# Patient Record
Sex: MALE | Race: WHITE | NOT HISPANIC OR LATINO | Employment: OTHER | URBAN - METROPOLITAN AREA
[De-identification: names, ages, dates, MRNs, and addresses within clinical notes are randomized per-mention and may not be internally consistent; named-entity substitution may affect disease eponyms.]

---

## 2017-12-07 ENCOUNTER — HOSPITAL ENCOUNTER (EMERGENCY)
Facility: HOSPITAL | Age: 67
Discharge: HOME | End: 2017-12-07
Attending: EMERGENCY MEDICINE | Admitting: EMERGENCY MEDICINE

## 2017-12-07 ENCOUNTER — APPOINTMENT (OUTPATIENT)
Dept: CT IMAGING | Facility: HOSPITAL | Age: 67
End: 2017-12-07

## 2017-12-07 VITALS
HEART RATE: 83 BPM | DIASTOLIC BLOOD PRESSURE: 91 MMHG | WEIGHT: 170 LBS | TEMPERATURE: 97.8 F | SYSTOLIC BLOOD PRESSURE: 155 MMHG | BODY MASS INDEX: 24.34 KG/M2 | RESPIRATION RATE: 18 BRPM | OXYGEN SATURATION: 97 % | HEIGHT: 70 IN

## 2017-12-07 DIAGNOSIS — S01.01XA LACERATION OF SCALP, INITIAL ENCOUNTER: ICD-10-CM

## 2017-12-07 DIAGNOSIS — S09.90XA CLOSED HEAD INJURY, INITIAL ENCOUNTER: Primary | ICD-10-CM

## 2017-12-07 DIAGNOSIS — S00.03XA HEMATOMA OF SCALP, INITIAL ENCOUNTER: ICD-10-CM

## 2017-12-07 DIAGNOSIS — S00.03XA CONTUSION OF SCALP, INITIAL ENCOUNTER: ICD-10-CM

## 2017-12-07 PROCEDURE — 70450 CT HEAD/BRAIN W/O DYE: CPT

## 2017-12-07 PROCEDURE — 99283 EMERGENCY DEPT VISIT LOW MDM: CPT

## 2017-12-08 NOTE — DISCHARGE INSTRUCTIONS
Follow up with Dr. Mckoy in 1 week for staple removal.     Take your medications as prescribed.  His continue alcohol    Follow up with your provider as instructed.    Return here immediately for nausea, vomiting, increasing headache, change in mental status, or new or emergent concerns.

## 2017-12-08 NOTE — ED PROVIDER NOTES
Subjective   HPI Comments: This patient is a 67-year-old gentleman who is at the bar at a local restaurant when he slipped off a stool, striking the back of his head against the bar.  Episode was witnessed.  Small laceration was noted.  Positive loss of consciousness.  Patient has a history of heart disease and sees Dr. Chuy Osorio.  Patient does take anticoagulation.  Patient is here with a friend and is acting appropriately according to this friend.  Patient states that he has no headache, no neck pain, no back pain, no weakness in the upper or lower extremities.  No paresthesia, no headache no vision changes no nausea vomiting or diarrhea.  Patient feels well.  He tells me he had 2 drinks today, and according to the note, this was confirmed by the .  In summary, we have a 67-year-old gentleman who was drinking at a local bar when he slipped off the barstool, striking the back of his head against the bar and having witnessed loss of consciousness times approximately 30 seconds.  He is now awake, oriented ×3, and acting normally according to her friend who is here with him.  He comes in today for evaluation status post closed head injury in for evaluation and treatment of the aforementioned laceration.        Past Medical History:  CAD  HTN  MI  DM  HLD  Tetanus shot is up-to-date    Past Family History:  No cardiac disease    Patient is a 67 y.o. male presenting with fall.   History provided by:  Patient  Fall   Mechanism of injury: fall    Injury location:  Head/neck  Head/neck injury location:  Scalp  Incident location: Bar.  Arrived directly from scene: yes    Fall:     Fall occurred:  From a stool    Impact surface:  Unable to specify    Point of impact:  Head  Protective equipment: none    Suspicion of alcohol use: yes    Suspicion of drug use: no    Tetanus status:  Unknown  Associated symptoms: headaches and loss of consciousness    Associated symptoms: no chest pain, no hearing loss, no  nausea, no neck pain, no seizures and no vomiting        Review of Systems   Constitutional: Negative.  Negative for chills, fatigue, fever and unexpected weight change.   HENT: Negative for dental problem, ear pain, hearing loss and sinus pressure.    Eyes: Negative for pain and visual disturbance.   Respiratory: Negative for chest tightness and shortness of breath.    Cardiovascular: Negative for chest pain, palpitations and leg swelling.   Gastrointestinal: Negative for diarrhea, nausea, rectal pain and vomiting.   Genitourinary: Negative for difficulty urinating, dysuria, frequency, hematuria and urgency.   Musculoskeletal: Negative for myalgias, neck pain and neck stiffness.   Neurological: Positive for loss of consciousness, syncope and headaches. Negative for seizures, speech difficulty and light-headedness.   Psychiatric/Behavioral: Negative for confusion.   All other systems reviewed and are negative.      Past Medical History:   Diagnosis Date   • Coronary artery disease    • Diabetes mellitus    • Hyperlipidemia    • Hypertension    • Myocardial infarction        Allergies   Allergen Reactions   • Penicillins        Past Surgical History:   Procedure Laterality Date   • KNEE SURGERY Right        History reviewed. No pertinent family history.    Social History     Social History   • Marital status:      Spouse name: N/A   • Number of children: N/A   • Years of education: N/A     Social History Main Topics   • Smoking status: Never Smoker   • Smokeless tobacco: None   • Alcohol use Yes      Comment: 2-3 drinks of vodka weekly   • Drug use: No   • Sexual activity: Not Asked     Other Topics Concern   • None     Social History Narrative   • None         Objective   Physical Exam   Constitutional: He is oriented to person, place, and time. He appears well-developed.  Non-toxic appearance. No distress.   HENT:   Head: Normocephalic.   Right Ear: Tympanic membrane, external ear and ear canal normal.    Left Ear: Tympanic membrane, external ear and ear canal normal.   Nose: Nose normal. No nasal septal hematoma.   Mouth/Throat: Oropharynx is clear and moist. Mucous membranes are not dry. No oral lesions. No trismus in the jaw. No dental abscesses or uvula swelling. No posterior oropharyngeal erythema or tonsillar abscesses. No tonsillar exudate.   4 cm laceration to the right posterior occipital region   Eyes: EOM are normal. Pupils are equal, round, and reactive to light. Right conjunctiva is not injected. Left conjunctiva is not injected.   Neck: Normal range of motion. Neck supple. No JVD present. No tracheal tenderness present. No rigidity. Normal range of motion present.   Cardiovascular: Normal rate, regular rhythm and normal heart sounds.  Exam reveals no gallop and no friction rub.    Pulmonary/Chest: Effort normal and breath sounds normal. He has no wheezes. He has no rales. He exhibits no tenderness.   Abdominal: Soft. Bowel sounds are normal. He exhibits no distension, no pulsatile midline mass and no mass. There is no tenderness. There is no rigidity, no rebound, no guarding and no tenderness at McBurney's point.   No signs of acute abdomen.  No pain at McBurney's point.  No pulsatile abdominal mass     Musculoskeletal: Normal range of motion. He exhibits no edema, tenderness or deformity.   Lymphadenopathy:     He has no cervical adenopathy.   Neurological: He is alert and oriented to person, place, and time. He has normal strength. He displays no tremor. No cranial nerve deficit. He exhibits normal muscle tone.   5/5 strength bilaterally with flexion and extension of fingers, wrist, elbows, knees and hips as well as plantar and dorsiflexion of the foot.     Skin: Skin is warm and dry. No rash noted. No erythema.   Psychiatric: He has a normal mood and affect. His speech is normal and behavior is normal. Judgment and thought content normal. He is attentive.   Nursing note and vitals  reviewed.      Laceration Repair  Date/Time: 12/7/2017 10:38 PM  Performed by: KEZIA ABERNATHY  Authorized by: KEZIA ABERNATHY     Consent:     Consent obtained:  Verbal    Consent given by:  Patient    Risks discussed:  Pain, infection, need for additional repair, nerve damage, poor wound healing, vascular damage, poor cosmetic result and tendon damage    Alternatives discussed:  No treatment and delayed treatment  Anesthesia (see MAR for exact dosages):     Anesthesia method:  None  Laceration details:     Location:  Scalp    Scalp location:  Occipital    Length (cm):  4  Repair type:     Repair type:  Simple  Pre-procedure details:     Preparation:  Patient was prepped and draped in usual sterile fashion  Treatment:     Area cleansed with:  Betadine    Amount of cleaning:  Standard    Irrigation method:  Pressure wash    Visualized foreign bodies/material removed: no    Skin repair:     Repair method:  Staples    Number of staples:  2  Approximation:     Approximation:  Close    Vermilion border: well-aligned    Post-procedure details:     Dressing:  Open (no dressing)    Patient tolerance of procedure:  Tolerated well, no immediate complications                 ED Course  ED Course   Comment By Time   I had a long conversation with the patient and his friend.  His friend is not intoxicated and will take the patient home.  Friend reports that the patient is acting appropriately from a mental status standpoint.  The patient has a 4 cm laceration in the right posterior occipital scalp region.  CT scan of the head is negative for acute intracranial abnormality.  The patient's tetanus shot is up-to-date.  Please see procedure note which will be placed in the chart for staple placement.  I've Doug told the patient and friend the staples will need to be removed in 7 days with primary care physician, Dr. Riley Mckoy D.O.  Impression will include scalp laceration, scalp hematoma, scalp contusion, and closed head  "injury status post fall.  Plan will include keeping wound clean and dry, follow up for staple removal in 7 days, return immediately for nausea vomiting or change in mental status or increasing headache.  Patient appreciative for care without question or complaint.'s patient is speaking clearly, he is oriented ×3, and has someone willing to take him home.  All questions answered and patient will be discharged home accordingly Compa Hair MD 12/07 5200   Please see procedure note for details.  Patient tolerated procedure well.  No, locations.  A total of 2 staples were placed with good approximation of the wound margin and good hemostasis.  The patient will be discharged home under the care of his friend.  Impression and plan as previously noted.  Patient is had all questions answered and will be discharged home accordingly.  CT scan of the head negative for acute abnormalities. Compa Hair MD 12/07 4234     No results found for this or any previous visit (from the past 24 hour(s)).  Note: In addition to lab results from this visit, the labs listed above may include labs taken at another facility or during a different encounter within the last 24 hours. Please correlate lab times with ED admission and discharge times for further clarification of the services performed during this visit.    CT Head Without Contrast   Final Result      1.  No acute intracranial abnormality.      2.  Minimal right posterior soft tissue swelling.      3.  Incidental/non-acute findings are described above.      THIS DOCUMENT HAS BEEN ELECTRONICALLY SIGNED BY CATHY PALMER MD        Vitals:    12/07/17 2142   BP: 155/91   BP Location: Left arm   Patient Position: Lying   Pulse: 83   Resp: 18   Temp: 97.8 °F (36.6 °C)   TempSrc: Oral   SpO2: 97%   Weight: 77.1 kg (170 lb)   Height: 177.8 cm (70\")     Medications - No data to display  ECG/EMG Results (last 24 hours)     ** No results found for the last 24 hours. **                "         MDM    Final diagnoses:   Closed head injury, initial encounter   Hematoma of scalp, initial encounter   Laceration of scalp, initial encounter   Contusion of scalp, initial encounter     EMR Dragon/Transcription disclaimer:  Much of this encounter note is an electronic transcription/translation of spoken language to printed text. The electronic translation of spoken language may permit erroneous, or at times, nonsensical words or phrases to be inadvertently transcribed; Although I have reviewed the note for such errors, some may still exist.    Documentation assistance provided by hank Thornton.  Information recorded by the hank was done at my direction and has been verified and validated by me.     Ehsan JONES Thornton  12/07/17 2226       Ehsan JONES Thornton  12/07/17 2227       Ehsan JONES Thornton  12/07/17 2234       Ehsan JONES Thornton  12/07/17 2254       Compa Hair MD  12/08/17 0129

## 2022-09-05 ENCOUNTER — APPOINTMENT (OUTPATIENT)
Dept: GENERAL RADIOLOGY | Facility: HOSPITAL | Age: 72
End: 2022-09-05

## 2022-09-05 ENCOUNTER — HOSPITAL ENCOUNTER (INPATIENT)
Facility: HOSPITAL | Age: 72
LOS: 9 days | Discharge: HOME HEALTH | End: 2022-09-15
Attending: EMERGENCY MEDICINE | Admitting: INTERNAL MEDICINE

## 2022-09-05 DIAGNOSIS — Z79.4 TYPE 2 DIABETES MELLITUS WITHOUT COMPLICATION, WITH LONG-TERM CURRENT USE OF INSULIN: Chronic | ICD-10-CM

## 2022-09-05 DIAGNOSIS — E11.9 TYPE 2 DIABETES MELLITUS WITHOUT COMPLICATION, WITH LONG-TERM CURRENT USE OF INSULIN: Chronic | ICD-10-CM

## 2022-09-05 DIAGNOSIS — N28.9 IMPAIRED RENAL FUNCTION: ICD-10-CM

## 2022-09-05 DIAGNOSIS — J90 RECURRENT RIGHT PLEURAL EFFUSION: Primary | ICD-10-CM

## 2022-09-05 DIAGNOSIS — R18.8 CIRRHOSIS OF LIVER WITH ASCITES, UNSPECIFIED HEPATIC CIRRHOSIS TYPE: ICD-10-CM

## 2022-09-05 DIAGNOSIS — I25.10 CORONARY ARTERY DISEASE INVOLVING NATIVE CORONARY ARTERY OF NATIVE HEART WITHOUT ANGINA PECTORIS: Chronic | ICD-10-CM

## 2022-09-05 DIAGNOSIS — I10 HYPERTENSION, UNSPECIFIED TYPE: Chronic | ICD-10-CM

## 2022-09-05 DIAGNOSIS — R06.02 SHORTNESS OF BREATH: ICD-10-CM

## 2022-09-05 DIAGNOSIS — K74.60 CIRRHOSIS OF LIVER WITH ASCITES, UNSPECIFIED HEPATIC CIRRHOSIS TYPE: ICD-10-CM

## 2022-09-05 PROBLEM — K74.69 OTHER CIRRHOSIS OF LIVER (HCC): Chronic | Status: ACTIVE | Noted: 2022-09-05

## 2022-09-05 LAB
ALBUMIN SERPL-MCNC: 3.2 G/DL (ref 3.5–5.2)
ALBUMIN/GLOB SERPL: 0.7 G/DL
ALP SERPL-CCNC: 146 U/L (ref 39–117)
ALT SERPL W P-5'-P-CCNC: 58 U/L (ref 1–41)
ANION GAP SERPL CALCULATED.3IONS-SCNC: 13 MMOL/L (ref 5–15)
AST SERPL-CCNC: 109 U/L (ref 1–40)
BASOPHILS # BLD AUTO: 0.02 10*3/MM3 (ref 0–0.2)
BASOPHILS NFR BLD AUTO: 0.4 % (ref 0–1.5)
BILIRUB SERPL-MCNC: 0.2 MG/DL (ref 0–1.2)
BUN SERPL-MCNC: 14 MG/DL (ref 8–23)
BUN/CREAT SERPL: 9.6 (ref 7–25)
CALCIUM SPEC-SCNC: 9 MG/DL (ref 8.6–10.5)
CHLORIDE SERPL-SCNC: 101 MMOL/L (ref 98–107)
CO2 SERPL-SCNC: 22 MMOL/L (ref 22–29)
CREAT SERPL-MCNC: 1.46 MG/DL (ref 0.76–1.27)
DEPRECATED RDW RBC AUTO: 51 FL (ref 37–54)
EGFRCR SERPLBLD CKD-EPI 2021: 50.8 ML/MIN/1.73
EOSINOPHIL # BLD AUTO: 0.29 10*3/MM3 (ref 0–0.4)
EOSINOPHIL NFR BLD AUTO: 5.8 % (ref 0.3–6.2)
ERYTHROCYTE [DISTWIDTH] IN BLOOD BY AUTOMATED COUNT: 16.2 % (ref 12.3–15.4)
GLOBULIN UR ELPH-MCNC: 4.5 GM/DL
GLUCOSE BLDC GLUCOMTR-MCNC: 89 MG/DL (ref 70–130)
GLUCOSE SERPL-MCNC: 227 MG/DL (ref 65–99)
HBA1C MFR BLD: 9.3 % (ref 4.8–5.6)
HCT VFR BLD AUTO: 30.3 % (ref 37.5–51)
HGB BLD-MCNC: 9.8 G/DL (ref 13–17.7)
HOLD SPECIMEN: NORMAL
IMM GRANULOCYTES # BLD AUTO: 0.03 10*3/MM3 (ref 0–0.05)
IMM GRANULOCYTES NFR BLD AUTO: 0.6 % (ref 0–0.5)
INR PPP: 1.05 (ref 0.84–1.13)
LYMPHOCYTES # BLD AUTO: 0.77 10*3/MM3 (ref 0.7–3.1)
LYMPHOCYTES NFR BLD AUTO: 15.3 % (ref 19.6–45.3)
MCH RBC QN AUTO: 28.1 PG (ref 26.6–33)
MCHC RBC AUTO-ENTMCNC: 32.3 G/DL (ref 31.5–35.7)
MCV RBC AUTO: 86.8 FL (ref 79–97)
MONOCYTES # BLD AUTO: 0.76 10*3/MM3 (ref 0.1–0.9)
MONOCYTES NFR BLD AUTO: 15.1 % (ref 5–12)
NEUTROPHILS NFR BLD AUTO: 3.15 10*3/MM3 (ref 1.7–7)
NEUTROPHILS NFR BLD AUTO: 62.8 % (ref 42.7–76)
NRBC BLD AUTO-RTO: 0 /100 WBC (ref 0–0.2)
NT-PROBNP SERPL-MCNC: 332.2 PG/ML (ref 0–900)
PLATELET # BLD AUTO: 83 10*3/MM3 (ref 140–450)
PMV BLD AUTO: 12.4 FL (ref 6–12)
POTASSIUM SERPL-SCNC: 3.9 MMOL/L (ref 3.5–5.2)
PROT SERPL-MCNC: 7.7 G/DL (ref 6–8.5)
PROTHROMBIN TIME: 13.6 SECONDS (ref 11.4–14.4)
RBC # BLD AUTO: 3.49 10*6/MM3 (ref 4.14–5.8)
SODIUM SERPL-SCNC: 136 MMOL/L (ref 136–145)
TROPONIN T SERPL-MCNC: 0.01 NG/ML (ref 0–0.03)
WBC NRBC COR # BLD: 5.02 10*3/MM3 (ref 3.4–10.8)
WHOLE BLOOD HOLD COAG: NORMAL
WHOLE BLOOD HOLD SPECIMEN: NORMAL

## 2022-09-05 PROCEDURE — 84484 ASSAY OF TROPONIN QUANT: CPT | Performed by: EMERGENCY MEDICINE

## 2022-09-05 PROCEDURE — 83036 HEMOGLOBIN GLYCOSYLATED A1C: CPT | Performed by: INTERNAL MEDICINE

## 2022-09-05 PROCEDURE — 99284 EMERGENCY DEPT VISIT MOD MDM: CPT

## 2022-09-05 PROCEDURE — 83880 ASSAY OF NATRIURETIC PEPTIDE: CPT | Performed by: EMERGENCY MEDICINE

## 2022-09-05 PROCEDURE — 63710000001 INSULIN DETEMIR PER 5 UNITS: Performed by: INTERNAL MEDICINE

## 2022-09-05 PROCEDURE — 85610 PROTHROMBIN TIME: CPT | Performed by: INTERNAL MEDICINE

## 2022-09-05 PROCEDURE — G0378 HOSPITAL OBSERVATION PER HR: HCPCS

## 2022-09-05 PROCEDURE — 93005 ELECTROCARDIOGRAM TRACING: CPT

## 2022-09-05 PROCEDURE — 82962 GLUCOSE BLOOD TEST: CPT

## 2022-09-05 PROCEDURE — 85025 COMPLETE CBC W/AUTO DIFF WBC: CPT

## 2022-09-05 PROCEDURE — 71045 X-RAY EXAM CHEST 1 VIEW: CPT

## 2022-09-05 PROCEDURE — 80053 COMPREHEN METABOLIC PANEL: CPT | Performed by: EMERGENCY MEDICINE

## 2022-09-05 PROCEDURE — 36415 COLL VENOUS BLD VENIPUNCTURE: CPT

## 2022-09-05 PROCEDURE — 93005 ELECTROCARDIOGRAM TRACING: CPT | Performed by: EMERGENCY MEDICINE

## 2022-09-05 PROCEDURE — 99223 1ST HOSP IP/OBS HIGH 75: CPT | Performed by: INTERNAL MEDICINE

## 2022-09-05 RX ORDER — LACTULOSE 10 G/15ML
10 SOLUTION ORAL DAILY
Status: DISCONTINUED | OUTPATIENT
Start: 2022-09-05 | End: 2022-09-07

## 2022-09-05 RX ORDER — ATORVASTATIN CALCIUM 10 MG/1
10 TABLET, FILM COATED ORAL NIGHTLY
Status: DISCONTINUED | OUTPATIENT
Start: 2022-09-05 | End: 2022-09-15 | Stop reason: HOSPADM

## 2022-09-05 RX ORDER — AMOXICILLIN 250 MG
2 CAPSULE ORAL 2 TIMES DAILY
Status: DISCONTINUED | OUTPATIENT
Start: 2022-09-05 | End: 2022-09-15 | Stop reason: HOSPADM

## 2022-09-05 RX ORDER — POLYETHYLENE GLYCOL 3350 17 G/17G
17 POWDER, FOR SOLUTION ORAL DAILY PRN
Status: DISCONTINUED | OUTPATIENT
Start: 2022-09-05 | End: 2022-09-15 | Stop reason: HOSPADM

## 2022-09-05 RX ORDER — SODIUM CHLORIDE 0.9 % (FLUSH) 0.9 %
10 SYRINGE (ML) INJECTION EVERY 12 HOURS SCHEDULED
Status: DISCONTINUED | OUTPATIENT
Start: 2022-09-05 | End: 2022-09-15 | Stop reason: HOSPADM

## 2022-09-05 RX ORDER — DEXTROSE MONOHYDRATE 25 G/50ML
25 INJECTION, SOLUTION INTRAVENOUS
Status: DISCONTINUED | OUTPATIENT
Start: 2022-09-05 | End: 2022-09-15 | Stop reason: HOSPADM

## 2022-09-05 RX ORDER — METOPROLOL SUCCINATE 50 MG/1
50 TABLET, EXTENDED RELEASE ORAL
Status: DISCONTINUED | OUTPATIENT
Start: 2022-09-05 | End: 2022-09-08

## 2022-09-05 RX ORDER — SODIUM CHLORIDE 0.9 % (FLUSH) 0.9 %
10 SYRINGE (ML) INJECTION AS NEEDED
Status: DISCONTINUED | OUTPATIENT
Start: 2022-09-05 | End: 2022-09-15 | Stop reason: HOSPADM

## 2022-09-05 RX ORDER — INSULIN LISPRO 100 [IU]/ML
0-7 INJECTION, SOLUTION INTRAVENOUS; SUBCUTANEOUS
Status: DISCONTINUED | OUTPATIENT
Start: 2022-09-05 | End: 2022-09-12 | Stop reason: DRUGHIGH

## 2022-09-05 RX ORDER — ACETAMINOPHEN 650 MG/1
325 SUPPOSITORY RECTAL EVERY 6 HOURS PRN
Status: DISCONTINUED | OUTPATIENT
Start: 2022-09-05 | End: 2022-09-15 | Stop reason: HOSPADM

## 2022-09-05 RX ORDER — ONDANSETRON 2 MG/ML
4 INJECTION INTRAMUSCULAR; INTRAVENOUS EVERY 6 HOURS PRN
Status: DISCONTINUED | OUTPATIENT
Start: 2022-09-05 | End: 2022-09-15 | Stop reason: HOSPADM

## 2022-09-05 RX ORDER — BISACODYL 5 MG/1
5 TABLET, DELAYED RELEASE ORAL DAILY PRN
Status: DISCONTINUED | OUTPATIENT
Start: 2022-09-05 | End: 2022-09-15 | Stop reason: HOSPADM

## 2022-09-05 RX ORDER — LEVOTHYROXINE SODIUM 0.07 MG/1
75 TABLET ORAL
Status: DISCONTINUED | OUTPATIENT
Start: 2022-09-06 | End: 2022-09-15 | Stop reason: HOSPADM

## 2022-09-05 RX ORDER — ONDANSETRON 4 MG/1
4 TABLET, FILM COATED ORAL EVERY 6 HOURS PRN
Status: DISCONTINUED | OUTPATIENT
Start: 2022-09-05 | End: 2022-09-15 | Stop reason: HOSPADM

## 2022-09-05 RX ORDER — ACETAMINOPHEN 500 MG
500 TABLET ORAL EVERY 6 HOURS PRN
Status: DISCONTINUED | OUTPATIENT
Start: 2022-09-05 | End: 2022-09-15 | Stop reason: HOSPADM

## 2022-09-05 RX ORDER — PANTOPRAZOLE SODIUM 40 MG/1
40 TABLET, DELAYED RELEASE ORAL
Status: DISCONTINUED | OUTPATIENT
Start: 2022-09-06 | End: 2022-09-14

## 2022-09-05 RX ORDER — VENLAFAXINE HYDROCHLORIDE 75 MG/1
150 CAPSULE, EXTENDED RELEASE ORAL
Status: DISCONTINUED | OUTPATIENT
Start: 2022-09-06 | End: 2022-09-15 | Stop reason: HOSPADM

## 2022-09-05 RX ORDER — NICOTINE POLACRILEX 4 MG
15 LOZENGE BUCCAL
Status: DISCONTINUED | OUTPATIENT
Start: 2022-09-05 | End: 2022-09-15 | Stop reason: HOSPADM

## 2022-09-05 RX ORDER — ACETAMINOPHEN 160 MG/5ML
500 SOLUTION ORAL EVERY 6 HOURS PRN
Status: DISCONTINUED | OUTPATIENT
Start: 2022-09-05 | End: 2022-09-15 | Stop reason: HOSPADM

## 2022-09-05 RX ORDER — BISACODYL 10 MG
10 SUPPOSITORY, RECTAL RECTAL DAILY PRN
Status: DISCONTINUED | OUTPATIENT
Start: 2022-09-05 | End: 2022-09-15 | Stop reason: HOSPADM

## 2022-09-05 RX ADMIN — ATORVASTATIN CALCIUM 10 MG: 10 TABLET, FILM COATED ORAL at 21:04

## 2022-09-05 RX ADMIN — LACTULOSE 10 G: 20 SOLUTION ORAL at 17:35

## 2022-09-05 RX ADMIN — METOPROLOL SUCCINATE 50 MG: 50 TABLET, EXTENDED RELEASE ORAL at 17:35

## 2022-09-05 RX ADMIN — RIFAXIMIN 550 MG: 550 TABLET ORAL at 21:05

## 2022-09-05 RX ADMIN — INSULIN DETEMIR 15 UNITS: 100 INJECTION, SOLUTION SUBCUTANEOUS at 21:02

## 2022-09-05 RX ADMIN — Medication 10 ML: at 21:07

## 2022-09-05 NOTE — PLAN OF CARE
"  Problem: Adult Inpatient Plan of Care  Goal: Plan of Care Review  Outcome: Ongoing, Progressing  Flowsheets (Taken 9/5/2022 1825)  Plan of Care Reviewed With: patient  Outcome Evaluation: Patient alert, oriented x 4, respiration symmetrical, unlabored on room air, no shortness of breath noted. Patient made no complaints of pain or discomfort. Oriented to care setting, watched \" Call, Don't Fall video\", educated to use call light when needing assistance. Dinner tolerated, no eating or swallowing issues reported. Monitoring continues.  Goal: Patient-Specific Goal (Individualized)  Outcome: Ongoing, Progressing  Goal: Absence of Hospital-Acquired Illness or Injury  Outcome: Ongoing, Progressing  Intervention: Prevent and Manage VTE (Venous Thromboembolism) Risk  Recent Flowsheet Documentation  Taken 9/5/2022 1708 by Kristie Hayden RN  Range of Motion: active ROM (range of motion) encouraged  Goal: Optimal Comfort and Wellbeing  Outcome: Ongoing, Progressing  Intervention: Provide Person-Centered Care  Recent Flowsheet Documentation  Taken 9/5/2022 1708 by Kristie Hayden RN  Trust Relationship/Rapport:   care explained   questions answered   questions encouraged  Goal: Readiness for Transition of Care  Outcome: Ongoing, Progressing  Intervention: Mutually Develop Transition Plan  Recent Flowsheet Documentation  Taken 9/5/2022 1708 by Kristie Hayden RN  Transportation Anticipated: family or friend will provide  Patient/Family Anticipated Services at Transition: none  Patient/Family Anticipates Transition to: (Assisted Living Facility) other (see comments)  Taken 9/5/2022 1707 by Kristie Hayden RN  Equipment Currently Used at Home: none   Goal Outcome Evaluation:  Plan of Care Reviewed With: patient           Outcome Evaluation: Patient alert, oriented x 4, respiration symmetrical, unlabored on room air, no shortness of breath noted. Patient made no complaints of pain or discomfort. Oriented to care setting, " "watched \" Call, Don't Fall video\", educated to use call light when needing assistance. Dinner tolerated, no eating or swallowing issues reported. Monitoring continues.  "

## 2022-09-05 NOTE — H&P
[facility] Medicine Services  HISTORY AND PHYSICAL    Patient Name: Gwyn Fuchs  : 1950  MRN: [id number]  Primary Care Physician: Riley Mckoy DO  Date of admission: 2022      Subjective   Subjective     Chief Complaint:  Shortness of breath, cough    HPI:  Gwyn Fuchs is a 72 y.o. male w/ LEZAMA cirrhosis, CAD s/p remote stenting, prior cigar use who presents to the ED with his daughter at bedside for evaluation of recurrent pleural effusion.  He developed orthopnea, exertional dyspnea, intermittent cough (sometimes productive of scant clear sputum) 3+ months ago. His PCP identified a large RT pleural effusion and referred him to pulmonology at [place], on 22 and again 22 he underwent large-volume thoracentesis with ~2L fluid removal each time.  Per the patient/daughter they are unclear of any formal diagnosis or laboratory studies being sent.  His fluid has been quickly reaccumulating with recurrence of his symptoms, they felt frustrated they were not making answers and presented to our ED for further evaluation.  He denies fever, chill, weight loss, hemoptysis, wheezing, abdominal distention, peripheral edema.    He was previously followed with Dr. Zay Osorio, jas last appointment, potentially .  Family took away his car keys and moved him into assisted living earlier this year for gradual memory/cognitive issues.      Review of Systems   Gen- No fevers, chills  CV- No chest pain, palpitations  Resp-yes cough, dyspnea  GI- No N/V/D, abd pain  All other systems reviewed and are negative.     Personal History     Past Medical History:   Diagnosis Date   • Coronary artery disease    • Diabetes mellitus (HCC)    • Hyperlipidemia    • Hypertension    • Myocardial infarction (HCC)              Past Surgical History:   Procedure Laterality Date   • KNEE SURGERY Right        Family History:  family history includes Cancer in his brother; Heart  "failure in his father. Otherwise pertinent FHx was reviewed and unremarkable.     Social History:  reports that he has quit smoking. His smoking use included cigars. He has never used smokeless tobacco. He reports previous alcohol use. He reports that he does not use drugs.  Social History     Social History Narrative    Hx of \"social\" cigar use; prior EtOH use of 2-3 beers every other weekend; early 2022 moved into assisted living and family took away his car keys for early memory issues       Medications:  Available home medication information reviewed.  (Not in a hospital admission)      Allergies   Allergen Reactions   • Penicillins        Objective   Objective     Vital Signs:   Temp:  [98.1 °F (36.7 °C)] 98.1 °F (36.7 °C)  Heart Rate:  [] 92  Resp:  [20] 20  BP: (153-170)/(86-98) 165/96       Physical Exam   Constitutional: Awake, alert, no acute distress lying on ED stretcher  Eyes: PERRL, sclerae anicteric, no conjunctival injection  HENT: NCAT, mucous membranes moist  Neck: Supple, trachea midline  Respiratory: Diminished/absent breath sounds over RT middle and lower lung fields, otherwise CTAB, respiratory effort normal  Cardiovascular: RRR, no murmurs, rubs, or gallops, palpable pedal pulses bilaterally  Gastrointestinal: Positive bowel sounds, soft, nontender, nondistended  Musculoskeletal: No bilateral ankle edema, no clubbing or cyanosis to extremities  Psychiatric: Appropriate affect, cooperative  Neurologic: Speech clear and fluent, moving all extremities spontaneously    Result Review:  I have personally reviewed the results from the time of this admission to 9/5/2022 16:30 EDT and agree with these findings:  []  Laboratory list / accordion  []  Microbiology  [x]  Radiology  []  EKG/Telemetry   []  Cardiology/Vascular   []  Pathology  []  Old records  []  Other:  Most notable findings include: Large RT pleural effusion        LAB RESULTS:      Lab 09/05/22  1230   WBC 5.02   HEMOGLOBIN 9.8* "   HEMATOCRIT 30.3*   PLATELETS 83*   NEUTROS ABS 3.15   IMMATURE GRANS (ABS) 0.03   LYMPHS ABS 0.77   MONOS ABS 0.76   EOS ABS 0.29   MCV 86.8         Lab 09/05/22  1230   SODIUM 136   POTASSIUM 3.9   CHLORIDE 101   CO2 22.0   ANION GAP 13.0   BUN 14   CREATININE 1.46*   EGFR 50.8*   GLUCOSE 227*   CALCIUM 9.0         Lab 09/05/22  1230   TOTAL PROTEIN 7.7   ALBUMIN 3.20*   GLOBULIN 4.5   ALT (SGPT) 58*   AST (SGOT) 109*   BILIRUBIN 0.2   ALK PHOS 146*         Lab 09/05/22  1230   PROBNP 332.2   TROPONIN T 0.012                     Microbiology Results (last 10 days)     ** No results found for the last 240 hours. **          XR Chest 1 View    Result Date: 9/5/2022   DATE OF EXAM: 9/5/2022 11:45 AM  PROCEDURE: XR CHEST 1 VW-  INDICATIONS: SOA triage protocol  COMPARISON: No Comparisons Available  TECHNIQUE: Portable Chest  FINDINGS:  Study is limited by overlying support and monitoring apparatus  There is a large right-sided pleural effusion suspected. Vascular crowding and dependent atelectasis noted at the right base. Fluid is noted within the fissure. Left lung appears grossly clear. Attenuation heart size is limited by pleural effusion. Mediastinum appears grossly unremarkable on the left. Osseous structures demonstrate no acute abnormality      Impression: IMPRESSION : Large right-sided pleural effusion with associated vascular crowding and dependent atelectasis on the right.  Left lung appears grossly clear[  This report was finalized on 9/5/2022 12:14 PM by Godwin Huston.            Assessment & Plan   Assessment & Plan     Active Hospital Problems    Diagnosis  POA   • **Recurrent right pleural effusion [J90]  Unknown   • Other cirrhosis of liver (HCC) [K74.69]  Yes   • Coronary artery disease involving native coronary artery of native heart without angina pectoris [I25.10]  Yes   • HTN (hypertension) [I10]  Yes   • Type 2 diabetes mellitus without complication, with long-term current use of insulin  (HCC) [E11.9, Z79.4]  Not Applicable   • Impaired renal function [N28.9]  Yes     Summary: This is a 72-year-old male with Lezama cirrhosis, CAD s/p prior MI and stenting, DM2 who was initially evaluated outpatient for progressive orthopnea/DHILLON and identified to have large pleural effusion, s/p x2 thoracentesis at OSH with unclear laboratory testing/results, presenting to our facility with recurrent symptoms and large pleural effusion    (Per medication list at the bedside patient's home medicines are as follows: Rifaximin 550 mg BID, lactulose (titrate for 2-3 small bowel movements), levothyroxine 75 mcg qam, Toprol-XL 50 mg da, omeprazole 20 mg da, venlafaxine 150 mg da, metformin (unsure dose), simvastatin 40 mg qhs, basal insulin 30U qam / 25U qhs)    Assessment/plan    Recurrent large RT pleural effusion, unclear etiology  -S/p large-volume thoracentesis at OSH 8/18, 8/24; via [facility] [place] basis; unclear labs/Dx  -Check echo  -Sputum culture if able to provide  - Orders placed for IR guided RT thoracentesis with associated labs, cytology, etc.  - Recommend postthoracentesis CXR PA-LAT    LEZAMA cirrhosis w/ anemia/thrombocytopenia  -Home Xifaxan, lactulose, PPI  - Not currently on any diuretics  - Pending thoracentesis labs, consider abd US for evaluation of ascites (clinically not distended)  -Add on INR    CAD s/p remote stenting  Hx MI  Hypertension  Hyperlipidemia  - Home Toprol-XL, statin  - Last known [facility] 2015, no intervention, had previously had stenting  -Previously followed by Dr. Osorio, last appointment ~2015?  -Data deficit for lack of statin use    DM type 2, unknown A1c, with long-term use of insulin  -Check A1c  - Baseline takes oral metformin and basal insulin 30U qAM / 25U qPM  - Levemir ordered w/ Accu-Cheks and SSI    Impaired renal function, data deficit  -Cr 1.46, eGFR 51; no other comparable labs since 2015, repeat chemistry panel in the a.m.      DVT prophylaxis:  SCDs      CODE STATUS:    Code Status and Medical Interventions:   Ordered at: 09/05/22 1624     Code Status (Patient has no pulse and is not breathing):    CPR (Attempt to Resuscitate)     Medical Interventions (Patient has pulse or is breathing):    Full Support         Reid Warren, DO  09/05/22     0

## 2022-09-05 NOTE — ED PROVIDER NOTES
Subjective   Mr. Fuchs presents with several days of shortness of breath.  He notes coughing as well.  Worse when he lays flat.  He tells me he feels like he cannot get a deep breath.  He has been seen recently at [facility] for this.  He has had a right-sided pleural effusion drained.  Neither he nor his daughter know the etiology of the effusion.      History provided by:  Patient and relative  Shortness of Breath  Severity:  Moderate  Onset quality:  Gradual  Timing:  Constant  Progression:  Worsening  Chronicity:  Recurrent  Context: activity    Context comment:  With new onset pleural effusion  Relieved by:  Nothing  Worsened by:  Activity (Supine position)  Ineffective treatments: Thoracentesis at [facility].  Associated symptoms: cough    Associated symptoms: no abdominal pain, no chest pain and no fever        Review of Systems   Constitutional: Negative for chills and fever.   Respiratory: Positive for cough and shortness of breath.    Cardiovascular: Negative for chest pain.   Gastrointestinal: Negative for abdominal distention and abdominal pain.   Skin: Negative for color change.   All other systems reviewed and are negative.      Past Medical History:   Diagnosis Date   • Coronary artery disease    • Diabetes mellitus (HCC)    • Hyperlipidemia    • Hypertension    • Myocardial infarction (HCC)        Allergies   Allergen Reactions   • Penicillins        Past Surgical History:   Procedure Laterality Date   • KNEE SURGERY Right        History reviewed. No pertinent family history.    Social History     Socioeconomic History   • Marital status:    Tobacco Use   • Smoking status: Never Smoker   Substance and Sexual Activity   • Alcohol use: Yes     Comment: 2-3 drinks of vodka weekly   • Drug use: No           Objective   Physical Exam  Vitals and nursing note reviewed.   Constitutional:       General: He is not in acute distress.     Appearance: Normal appearance.   HENT:      Head:  Normocephalic and atraumatic.      Nose: Nose normal. No congestion or rhinorrhea.      Mouth/Throat:      Mouth: Mucous membranes are moist.      Pharynx: No posterior oropharyngeal erythema.   Eyes:      General: No scleral icterus.     Conjunctiva/sclera: Conjunctivae normal.   Neck:      Comments: No JVD  Cardiovascular:      Rate and Rhythm: Normal rate and regular rhythm.      Heart sounds: No murmur heard.    No friction rub.   Pulmonary:      Effort: Pulmonary effort is normal. No respiratory distress.      Breath sounds: Examination of the right-upper field reveals decreased breath sounds. Examination of the right-middle field reveals decreased breath sounds. Examination of the right-lower field reveals decreased breath sounds. Decreased breath sounds present. No wheezing or rales.   Abdominal:      General: Bowel sounds are normal.      Palpations: Abdomen is soft.      Tenderness: There is no abdominal tenderness. There is no guarding or rebound.   Musculoskeletal:         General: No tenderness.      Cervical back: Normal range of motion and neck supple.      Right lower leg: No edema.      Left lower leg: No edema.   Skin:     General: Skin is warm and dry.      Capillary Refill: Capillary refill takes less than 2 seconds.      Coloration: Skin is not pale.   Neurological:      General: No focal deficit present.      Mental Status: He is alert and oriented to person, place, and time.      Coordination: Coordination normal.   Psychiatric:         Mood and Affect: Mood normal.         Behavior: Behavior normal.         Thought Content: Thought content normal.         Procedures           ED Course  ED Course as of 09/05/22 1559   Mon Sep 05, 2022   1754 I spoke with him about findings.  He does not know what his baseline hemoglobin or creatinine is.  The last labs we have were from 7 years ago however.  He reports worsening dyspnea on exertion.  He and his family voiced frustration that they do not know  what is causing the effusion and he has not been scheduled to have it drained again.  Have reviewed his chest x-ray and will discuss with the hospitalist on-call. [DT]      ED Course User Index  [DT] Ray Hayden MD                                           MDM  Number of Diagnoses or Management Options  Recurrent right pleural effusion: established and worsening  Shortness of breath: new and requires workup     Amount and/or Complexity of Data Reviewed  Clinical lab tests: reviewed and ordered  Tests in the radiology section of CPT®: ordered and reviewed  Tests in the medicine section of CPT®: reviewed  Decide to obtain previous medical records or to obtain history from someone other than the patient: yes  Obtain history from someone other than the patient: yes  Review and summarize past medical records: yes  Discuss the patient with other providers: yes  Independent visualization of images, tracings, or specimens: yes    Patient Progress  Patient progress: improved      Final diagnoses:   Recurrent right pleural effusion   Shortness of breath       ED Disposition  ED Disposition     ED Disposition   Decision to Admit    Condition   --    Comment   Level of Care: Telemetry [5]   Diagnosis: Recurrent right pleural effusion [149028]   Admitting Physician: NATA LOYA [[id number]]   Attending Physician: NATA LOYA [[id number]]               No follow-up provider specified.       Medication List      No changes were made to your prescriptions during this visit.          Ray Hayden MD  09/05/22 5549

## 2022-09-06 ENCOUNTER — APPOINTMENT (OUTPATIENT)
Dept: ULTRASOUND IMAGING | Facility: HOSPITAL | Age: 72
End: 2022-09-06

## 2022-09-06 ENCOUNTER — APPOINTMENT (OUTPATIENT)
Dept: GENERAL RADIOLOGY | Facility: HOSPITAL | Age: 72
End: 2022-09-06

## 2022-09-06 ENCOUNTER — APPOINTMENT (OUTPATIENT)
Dept: CARDIOLOGY | Facility: HOSPITAL | Age: 72
End: 2022-09-06

## 2022-09-06 LAB
ALBUMIN FLD-MCNC: 1.2 G/DL
ALBUMIN SERPL-MCNC: 2.9 G/DL (ref 3.5–5.2)
ALBUMIN/GLOB SERPL: 0.6 G/DL
ALP SERPL-CCNC: 151 U/L (ref 39–117)
ALT SERPL W P-5'-P-CCNC: 67 U/L (ref 1–41)
ANION GAP SERPL CALCULATED.3IONS-SCNC: 11 MMOL/L (ref 5–15)
APPEARANCE FLD: CLEAR
AST SERPL-CCNC: 108 U/L (ref 1–40)
BH CV ECHO MEAS - AI P1/2T: 412.9 MSEC
BH CV ECHO MEAS - AO MAX PG: 7.5 MMHG
BH CV ECHO MEAS - AO MEAN PG: 4 MMHG
BH CV ECHO MEAS - AO ROOT DIAM: 3.6 CM
BH CV ECHO MEAS - AO V2 MAX: 136 CM/SEC
BH CV ECHO MEAS - AO V2 VTI: 23.5 CM
BH CV ECHO MEAS - AVA(I,D): 1.73 CM2
BH CV ECHO MEAS - EDV(CUBED): 125 ML
BH CV ECHO MEAS - EDV(MOD-SP2): 126 ML
BH CV ECHO MEAS - EDV(MOD-SP4): 155 ML
BH CV ECHO MEAS - EF(MOD-BP): 37.3 %
BH CV ECHO MEAS - EF(MOD-SP2): 38.6 %
BH CV ECHO MEAS - EF(MOD-SP4): 31.6 %
BH CV ECHO MEAS - ESV(CUBED): 64 ML
BH CV ECHO MEAS - ESV(MOD-SP2): 77.4 ML
BH CV ECHO MEAS - ESV(MOD-SP4): 106 ML
BH CV ECHO MEAS - FS: 20 %
BH CV ECHO MEAS - IVS/LVPW: 1 CM
BH CV ECHO MEAS - IVSD: 1.2 CM
BH CV ECHO MEAS - LA DIMENSION: 3.3 CM
BH CV ECHO MEAS - LAT PEAK E' VEL: 8.6 CM/SEC
BH CV ECHO MEAS - LV MASS(C)D: 233.7 GRAMS
BH CV ECHO MEAS - LV MAX PG: 1.76 MMHG
BH CV ECHO MEAS - LV MEAN PG: 1 MMHG
BH CV ECHO MEAS - LV V1 MAX: 66.3 CM/SEC
BH CV ECHO MEAS - LV V1 VTI: 11.8 CM
BH CV ECHO MEAS - LVIDD: 5 CM
BH CV ECHO MEAS - LVIDS: 4 CM
BH CV ECHO MEAS - LVOT AREA: 3.5 CM2
BH CV ECHO MEAS - LVOT DIAM: 2.1 CM
BH CV ECHO MEAS - LVPWD: 1.2 CM
BH CV ECHO MEAS - MED PEAK E' VEL: 8 CM/SEC
BH CV ECHO MEAS - MR MAX PG: 109.8 MMHG
BH CV ECHO MEAS - MR MAX VEL: 524 CM/SEC
BH CV ECHO MEAS - MR MEAN PG: 67 MMHG
BH CV ECHO MEAS - MR MEAN VEL: 387 CM/SEC
BH CV ECHO MEAS - MR VTI: 161 CM
BH CV ECHO MEAS - MV A MAX VEL: 75.3 CM/SEC
BH CV ECHO MEAS - MV DEC SLOPE: 586 CM/SEC2
BH CV ECHO MEAS - MV DEC TIME: 0.12 MSEC
BH CV ECHO MEAS - MV E MAX VEL: 34.9 CM/SEC
BH CV ECHO MEAS - MV E/A: 0.46
BH CV ECHO MEAS - MV MAX PG: 5.9 MMHG
BH CV ECHO MEAS - MV MEAN PG: 2 MMHG
BH CV ECHO MEAS - MV P1/2T: 40.8 MSEC
BH CV ECHO MEAS - MV V2 VTI: 20 CM
BH CV ECHO MEAS - MVA(P1/2T): 5.4 CM2
BH CV ECHO MEAS - MVA(VTI): 2.03 CM2
BH CV ECHO MEAS - PA ACC TIME: 0.13 SEC
BH CV ECHO MEAS - PA PR(ACCEL): 19.6 MMHG
BH CV ECHO MEAS - PA V2 MAX: 149 CM/SEC
BH CV ECHO MEAS - RAP SYSTOLE: 3 MMHG
BH CV ECHO MEAS - RVSP: 37 MMHG
BH CV ECHO MEAS - SV(LVOT): 40.7 ML
BH CV ECHO MEAS - SV(MOD-SP2): 48.6 ML
BH CV ECHO MEAS - SV(MOD-SP4): 49 ML
BH CV ECHO MEAS - TAPSE (>1.6): 1.47 CM
BH CV ECHO MEAS - TR MAX PG: 25.6 MMHG
BH CV ECHO MEAS - TR MAX VEL: 249 CM/SEC
BH CV ECHO MEASUREMENTS AVERAGE E/E' RATIO: 4.2
BH CV XLRA - RV BASE: 2.6 CM
BH CV XLRA - RV LENGTH: 6.3 CM
BH CV XLRA - RV MID: 2.3 CM
BH CV XLRA - TDI S': 11.6 CM/SEC
BILIRUB SERPL-MCNC: 0.3 MG/DL (ref 0–1.2)
BUN SERPL-MCNC: 13 MG/DL (ref 8–23)
BUN/CREAT SERPL: 10.3 (ref 7–25)
CALCIUM SPEC-SCNC: 8.7 MG/DL (ref 8.6–10.5)
CHLORIDE SERPL-SCNC: 100 MMOL/L (ref 98–107)
CO2 SERPL-SCNC: 23 MMOL/L (ref 22–29)
COLOR FLD: NORMAL
CREAT SERPL-MCNC: 1.26 MG/DL (ref 0.76–1.27)
DEPRECATED RDW RBC AUTO: 52.9 FL (ref 37–54)
EGFRCR SERPLBLD CKD-EPI 2021: 60.6 ML/MIN/1.73
ERYTHROCYTE [DISTWIDTH] IN BLOOD BY AUTOMATED COUNT: 16 % (ref 12.3–15.4)
EXPIRATION DATE: NORMAL
GLOBULIN UR ELPH-MCNC: 4.5 GM/DL
GLUCOSE BLDC GLUCOMTR-MCNC: 109 MG/DL (ref 70–130)
GLUCOSE BLDC GLUCOMTR-MCNC: 272 MG/DL (ref 70–130)
GLUCOSE BLDC GLUCOMTR-MCNC: 313 MG/DL (ref 70–130)
GLUCOSE FLD-MCNC: 178 MG/DL
GLUCOSE SERPL-MCNC: 104 MG/DL (ref 65–99)
HCT VFR BLD AUTO: 32.6 % (ref 37.5–51)
HGB BLD-MCNC: 10.2 G/DL (ref 13–17.7)
KOH PREP NAIL: NORMAL
LDH FLD-CCNC: 86 U/L
LDH SERPL-CCNC: 356 U/L (ref 135–225)
LEFT ATRIUM VOLUME INDEX: 23.8 ML/M2
LYMPHOCYTES NFR FLD MANUAL: 28 %
Lab: NORMAL
MACROPHAGE FLUID: 58 %
MAXIMAL PREDICTED HEART RATE: 148 BPM
MCH RBC QN AUTO: 28.3 PG (ref 26.6–33)
MCHC RBC AUTO-ENTMCNC: 31.3 G/DL (ref 31.5–35.7)
MCV RBC AUTO: 90.6 FL (ref 79–97)
MESOTHL CELL NFR FLD MANUAL: 7 %
MONOCYTES NFR FLD: 3 %
NEUTROPHILS NFR FLD MANUAL: 4 %
PH FLD: 7.57 [PH]
PLATELET # BLD AUTO: 82 10*3/MM3 (ref 140–450)
PMV BLD AUTO: 13 FL (ref 6–12)
POTASSIUM SERPL-SCNC: 3.7 MMOL/L (ref 3.5–5.2)
PROT FLD-MCNC: 2.2 G/DL
PROT SERPL-MCNC: 7.4 G/DL (ref 6–8.5)
QT INTERVAL: 388 MS
QTC INTERVAL: 497 MS
RBC # BLD AUTO: 3.6 10*6/MM3 (ref 4.14–5.8)
RBC # FLD AUTO: <2000 /MM3
SODIUM SERPL-SCNC: 134 MMOL/L (ref 136–145)
STRESS TARGET HR: 126 BPM
WBC # FLD AUTO: 135 /MM3
WBC NRBC COR # BLD: 3.49 10*3/MM3 (ref 3.4–10.8)

## 2022-09-06 PROCEDURE — 87102 FUNGUS ISOLATION CULTURE: CPT | Performed by: INTERNAL MEDICINE

## 2022-09-06 PROCEDURE — 87070 CULTURE OTHR SPECIMN AEROBIC: CPT | Performed by: INTERNAL MEDICINE

## 2022-09-06 PROCEDURE — 87116 MYCOBACTERIA CULTURE: CPT | Performed by: INTERNAL MEDICINE

## 2022-09-06 PROCEDURE — 82247 BILIRUBIN TOTAL: CPT | Performed by: INTERNAL MEDICINE

## 2022-09-06 PROCEDURE — 76942 ECHO GUIDE FOR BIOPSY: CPT

## 2022-09-06 PROCEDURE — 87206 SMEAR FLUORESCENT/ACID STAI: CPT | Performed by: INTERNAL MEDICINE

## 2022-09-06 PROCEDURE — 82105 ALPHA-FETOPROTEIN SERUM: CPT | Performed by: INTERNAL MEDICINE

## 2022-09-06 PROCEDURE — 71045 X-RAY EXAM CHEST 1 VIEW: CPT

## 2022-09-06 PROCEDURE — 89051 BODY FLUID CELL COUNT: CPT | Performed by: INTERNAL MEDICINE

## 2022-09-06 PROCEDURE — 99232 SBSQ HOSP IP/OBS MODERATE 35: CPT | Performed by: INTERNAL MEDICINE

## 2022-09-06 PROCEDURE — 82945 GLUCOSE OTHER FLUID: CPT | Performed by: INTERNAL MEDICINE

## 2022-09-06 PROCEDURE — 93306 TTE W/DOPPLER COMPLETE: CPT

## 2022-09-06 PROCEDURE — 63710000001 INSULIN LISPRO (HUMAN) PER 5 UNITS: Performed by: INTERNAL MEDICINE

## 2022-09-06 PROCEDURE — 0W993ZZ DRAINAGE OF RIGHT PLEURAL CAVITY, PERCUTANEOUS APPROACH: ICD-10-PCS | Performed by: RADIOLOGY

## 2022-09-06 PROCEDURE — 87075 CULTR BACTERIA EXCEPT BLOOD: CPT | Performed by: INTERNAL MEDICINE

## 2022-09-06 PROCEDURE — 82042 OTHER SOURCE ALBUMIN QUAN EA: CPT | Performed by: INTERNAL MEDICINE

## 2022-09-06 PROCEDURE — 82962 GLUCOSE BLOOD TEST: CPT

## 2022-09-06 PROCEDURE — 84157 ASSAY OF PROTEIN OTHER: CPT | Performed by: INTERNAL MEDICINE

## 2022-09-06 PROCEDURE — 83615 LACTATE (LD) (LDH) ENZYME: CPT | Performed by: INTERNAL MEDICINE

## 2022-09-06 PROCEDURE — 63710000001 INSULIN DETEMIR PER 5 UNITS: Performed by: INTERNAL MEDICINE

## 2022-09-06 PROCEDURE — 85027 COMPLETE CBC AUTOMATED: CPT | Performed by: INTERNAL MEDICINE

## 2022-09-06 PROCEDURE — 83986 ASSAY PH BODY FLUID NOS: CPT | Performed by: INTERNAL MEDICINE

## 2022-09-06 PROCEDURE — 87205 SMEAR GRAM STAIN: CPT | Performed by: INTERNAL MEDICINE

## 2022-09-06 PROCEDURE — 97161 PT EVAL LOW COMPLEX 20 MIN: CPT

## 2022-09-06 PROCEDURE — 80053 COMPREHEN METABOLIC PANEL: CPT | Performed by: INTERNAL MEDICINE

## 2022-09-06 PROCEDURE — C1729 CATH, DRAINAGE: HCPCS

## 2022-09-06 PROCEDURE — 87220 TISSUE EXAM FOR FUNGI: CPT | Performed by: INTERNAL MEDICINE

## 2022-09-06 RX ORDER — INSULIN LISPRO 100 [IU]/ML
30 INJECTION, SOLUTION INTRAVENOUS; SUBCUTANEOUS EVERY MORNING
Status: ON HOLD | COMMUNITY
End: 2022-09-06

## 2022-09-06 RX ORDER — INSULIN LISPRO 100 [IU]/ML
25 INJECTION, SOLUTION INTRAVENOUS; SUBCUTANEOUS NIGHTLY
Status: ON HOLD | COMMUNITY
End: 2022-09-06

## 2022-09-06 RX ORDER — LEVOTHYROXINE SODIUM 0.07 MG/1
75 TABLET ORAL DAILY
COMMUNITY

## 2022-09-06 RX ORDER — LIDOCAINE HYDROCHLORIDE 10 MG/ML
10 INJECTION, SOLUTION EPIDURAL; INFILTRATION; INTRACAUDAL; PERINEURAL ONCE
Status: COMPLETED | OUTPATIENT
Start: 2022-09-06 | End: 2022-09-06

## 2022-09-06 RX ORDER — LACTULOSE 10 G/15ML
20 SOLUTION ORAL 3 TIMES DAILY
COMMUNITY

## 2022-09-06 RX ORDER — SIMVASTATIN 40 MG
40 TABLET ORAL NIGHTLY
COMMUNITY

## 2022-09-06 RX ORDER — VENLAFAXINE HYDROCHLORIDE 75 MG/1
75 CAPSULE, EXTENDED RELEASE ORAL DAILY
COMMUNITY
End: 2022-09-15 | Stop reason: HOSPADM

## 2022-09-06 RX ORDER — METOPROLOL SUCCINATE 50 MG/1
50 TABLET, EXTENDED RELEASE ORAL DAILY
COMMUNITY
End: 2022-09-15 | Stop reason: HOSPADM

## 2022-09-06 RX ADMIN — RIFAXIMIN 550 MG: 550 TABLET ORAL at 07:58

## 2022-09-06 RX ADMIN — INSULIN LISPRO 5 UNITS: 100 INJECTION, SOLUTION INTRAVENOUS; SUBCUTANEOUS at 17:10

## 2022-09-06 RX ADMIN — RIFAXIMIN 550 MG: 550 TABLET ORAL at 20:31

## 2022-09-06 RX ADMIN — METOPROLOL SUCCINATE 50 MG: 50 TABLET, EXTENDED RELEASE ORAL at 07:54

## 2022-09-06 RX ADMIN — INSULIN LISPRO 4 UNITS: 100 INJECTION, SOLUTION INTRAVENOUS; SUBCUTANEOUS at 12:30

## 2022-09-06 RX ADMIN — INSULIN DETEMIR 15 UNITS: 100 INJECTION, SOLUTION SUBCUTANEOUS at 20:33

## 2022-09-06 RX ADMIN — LEVOTHYROXINE SODIUM 75 MCG: 0.07 TABLET ORAL at 05:58

## 2022-09-06 RX ADMIN — LACTULOSE 10 G: 20 SOLUTION ORAL at 07:56

## 2022-09-06 RX ADMIN — Medication 10 ML: at 07:58

## 2022-09-06 RX ADMIN — INSULIN DETEMIR 15 UNITS: 100 INJECTION, SOLUTION SUBCUTANEOUS at 08:19

## 2022-09-06 RX ADMIN — SENNOSIDES AND DOCUSATE SODIUM 2 TABLET: 50; 8.6 TABLET ORAL at 20:30

## 2022-09-06 RX ADMIN — LIDOCAINE HYDROCHLORIDE 10 ML: 10 INJECTION, SOLUTION EPIDURAL; INFILTRATION; INTRACAUDAL; PERINEURAL at 10:50

## 2022-09-06 RX ADMIN — ATORVASTATIN CALCIUM 10 MG: 10 TABLET, FILM COATED ORAL at 20:31

## 2022-09-06 RX ADMIN — VENLAFAXINE HYDROCHLORIDE 150 MG: 75 CAPSULE, EXTENDED RELEASE ORAL at 07:56

## 2022-09-06 RX ADMIN — PANTOPRAZOLE SODIUM 40 MG: 40 TABLET, DELAYED RELEASE ORAL at 05:58

## 2022-09-06 RX ADMIN — SENNOSIDES AND DOCUSATE SODIUM 2 TABLET: 50; 8.6 TABLET ORAL at 07:56

## 2022-09-06 NOTE — THERAPY DISCHARGE NOTE
Patient Name: Gwyn Fuchs  : 1950    MRN: [id number]                              Today's Date: 2022       Admit Date: 2022    Visit Dx:     ICD-10-CM ICD-9-CM   1. Recurrent right pleural effusion  J90 511.9   2. Shortness of breath  R06.02 786.05     Patient Active Problem List   Diagnosis   • Other cirrhosis of liver (HCC)   • Coronary artery disease involving native coronary artery of native heart without angina pectoris   • Recurrent right pleural effusion   • HTN (hypertension)   • Type 2 diabetes mellitus without complication, with long-term current use of insulin (HCC)   • Impaired renal function     Past Medical History:   Diagnosis Date   • Coronary artery disease    • Diabetes mellitus (HCC)    • Hyperlipidemia    • Hypertension    • Myocardial infarction (HCC)      Past Surgical History:   Procedure Laterality Date   • KNEE SURGERY Right       General Information     Row Name 22 0843          Physical Therapy Time and Intention    Document Type discharge evaluation/summary  -FW     Mode of Treatment physical therapy  -FW     Row Name 22 0843          General Information    Patient Profile Reviewed yes  -FW     Prior Level of Function independent:;community mobility;gait;transfer;home management;dressing;ADL's  no AD/DME  -FW     Existing Precautions/Restrictions fall  -FW     Barriers to Rehab cognitive status  -FW     Row Name 22 0843          Living Environment    People in Home facility resident  -FW     Row Name 22 0843          Home Main Entrance    Number of Stairs, Main Entrance none  -FW     Stair Railings, Main Entrance none  -FW     Row Name 22 0843          Stairs Within Home, Primary    Number of Stairs, Within Home, Primary none  -FW     Row Name 22 0843          Cognition    Orientation Status (Cognition) oriented x 3  -FW     Row Name 22 0843          Safety Issues, Functional Mobility    Safety Issues Affecting Function (Mobility)  safety precaution awareness;safety precautions follow-through/compliance  -     Impairments Affecting Function (Mobility) endurance/activity tolerance  -           User Key  (r) = Recorded By, (t) = Taken By, (c) = Cosigned By    Initials Name Provider Type    FW Rolando Cabrera PT Physical Therapist               Mobility     Row Name 09/06/22 0844          Bed Mobility    Comment, (Bed Mobility) UIR upon arrival  -     Row Name 09/06/22 0844          Sit-Stand Transfer    Sit-Stand [place] (Transfers) supervision  -     Row Name 09/06/22 0844          Gait/Stairs (Locomotion)    [place] Level (Gait) standby assist  -     Distance in Feet (Gait) 250  -FW     Deviations/Abnormal Patterns (Gait) hannah decreased  -FW     Comment, (Gait/Stairs) slower hannah with mild complaints of SOB with prolonged ambulation; no LOB, major instabilities, nor buckling throughout gait  -           User Key  (r) = Recorded By, (t) = Taken By, (c) = Cosigned By    Initials Name Provider Type    FW Rolando Cabrera PT Physical Therapist               Obj/Interventions     Row Name 09/06/22 0846          Range of Motion Comprehensive    General Range of Motion no range of motion deficits identified  -     Row Name 09/06/22 0846          Strength Comprehensive (MMT)    General Manual Muscle Testing (MMT) Assessment no strength deficits identified  -     Row Name 09/06/22 0846          Balance    Balance Assessment sitting static balance;sitting dynamic balance;standing static balance;standing dynamic balance  -FW     Static Sitting Balance independent  -FW     Dynamic Sitting Balance independent  -FW     Static Standing Balance supervision  -     Dynamic Standing Balance supervision  -FW     Position/Device Used, Standing Balance unsupported  -     Row Name 09/06/22 0846          Sensory Assessment (Somatosensory)    Sensory Assessment (Somatosensory) sensation intact  -           User Key  (r) =  Recorded By, (t) = Taken By, (c) = Cosigned By    Initials Name Provider Type    FW Rolando Cabrera, PT Physical Therapist               Goals/Plan    No documentation.                Clinical Impression     Row Name 09/06/22 0846          Pain    Pretreatment Pain Rating 0/10 - no pain  -     Posttreatment Pain Rating 0/10 - no pain  -FW     Row Name 09/06/22 0846          Plan of Care Review    Plan of Care Reviewed With patient  -FW     Outcome Evaluation Pt pleasant and agreeable to PT. Pt presents with decreased activity tolerance limiting mobility. Pt performed transfers with supervision and ambulated 250' SBA w/o AD demonstrating decreased gait speed with complaints of mild SOB. Pt with no major mobility deficitis warranting PT at this time. Recommend dc ANTHONY or home with assist.  -     Row Name 09/06/22 0846          Therapy Assessment/Plan (PT)    Criteria for Skilled Interventions Met (PT) no;no problems identified which require skilled intervention  -     Row Name 09/06/22 0846          Vital Signs    Pre Systolic BP Rehab 151  -FW     Pre Treatment Diastolic BP 86  -FW     Pretreatment Heart Rate (beats/min) 87  -FW     Posttreatment Heart Rate (beats/min) 113  -FW     Pre SpO2 (%) 96  -FW     O2 Delivery Pre Treatment room air  -FW     O2 Delivery Intra Treatment room air  -FW     Post SpO2 (%) 97  -FW     O2 Delivery Post Treatment room air  -FW     Pre Patient Position Standing  -FW     Intra Patient Position Standing  -FW     Post Patient Position Sitting  -FW     [facility] Name 09/06/22 0846          Positioning and Restraints    Pre-Treatment Position standing in room  -FW     Post Treatment Position bed  -FW     In Bed notified nsg;call light within reach;encouraged to call for assist;sitting;with other staff  left EOB with x-ray tech  -           User Key  (r) = Recorded By, (t) = Taken By, (c) = Cosigned By    Initials Name Provider Type    FW Rolando Cabrera, PT Physical Therapist                Outcome Measures     Row Name 09/06/22 0851          How much help from another person do you currently need...    Turning from your back to your side while in flat bed without using bedrails? 4  -FW     Moving from lying on back to sitting on the side of a flat bed without bedrails? 4  -FW     Moving to and from a bed to a chair (including a wheelchair)? 4  -FW     Standing up from a chair using your arms (e.g., wheelchair, bedside chair)? 4  -FW     Climbing 3-5 steps with a railing? 4  -FW     To walk in hospital room? 4  -FW     AM-PAC 6 Clicks Score (PT) 24  -FW     Highest level of mobility 8 --> Walked 250 feet or more  -     Row Name 09/06/22 0851          Functional Assessment    Outcome Measure Options AM-PAC 6 Clicks Basic Mobility (PT)  -           User Key  (r) = Recorded By, (t) = Taken By, (c) = Cosigned By    Initials Name Provider Type     Rolando Cabrera PT Physical Therapist              Physical Therapy Education                 Title: PT OT SLP Therapies (Done)     Topic: Physical Therapy (Done)     Point: Mobility training (Done)     Learning Progress Summary           Patient Acceptance, E, VU by  at 9/6/2022 0851                   Point: Home exercise program (Done)     Learning Progress Summary           Patient Acceptance, E, VU by  at 9/6/2022 0851                   Point: Body mechanics (Done)     Learning Progress Summary           Patient Acceptance, E, VU by  at 9/6/2022 0851                   Point: Precautions (Done)     Learning Progress Summary           Patient Acceptance, E, VU by  at 9/6/2022 0851                               User Key     Initials Effective Dates Name Provider Type [facility] 05/05/22 -  Rolando Cabrera PT Physical Therapist PT              PT Recommendation and Plan     Plan of Care Reviewed With: patient  Outcome Evaluation: Pt pleasant and agreeable to PT. Pt presents with decreased activity tolerance limiting mobility. Pt  performed transfers with supervision and ambulated 250' SBA w/o AD demonstrating decreased gait speed with complaints of mild SOB. Pt with no major mobility deficitis warranting PT at this time. Recommend dc ANTHONY or home with assist.     Time Calculation:    PT Charges     Row Name 09/06/22 0854             Time Calculation    Start Time 0825  -FW      PT Received On 09/06/22  -FW              Untimed Charges    PT Eval/Re-eval Minutes 32  -FW              Total Minutes    Untimed Charges Total Minutes 32  -FW       Total Minutes 32  -FW            User Key  (r) = Recorded By, (t) = Taken By, (c) = Cosigned By    Initials Name Provider Type    FW Rolando Cabrera, PT Physical Therapist              Therapy Charges for Today     Code Description Service Date Service Provider Modifiers Qty    10463964624 HC PT EVAL LOW COMPLEXITY 3 9/6/2022 Rolando Cabrera, PT GP 1          PT G-Codes  Outcome Measure Options: AM-PAC 6 Clicks Basic Mobility (PT)  AM-PAC 6 Clicks Score (PT): 24    PT Discharge Summary  Anticipated Discharge Disposition (PT): assisted living, home with assist  Reason for Discharge: At baseline function    Rolando Cabrera PT  9/6/2022        carried

## 2022-09-06 NOTE — CASE MANAGEMENT/SOCIAL WORK
Discharge Planning Assessment  [facility]     Patient Name: Gwyn Fuchs  MRN: [id number]  Today's Date: 9/6/2022    Admit Date: 9/5/2022     Discharge Needs Assessment     Row Name 09/06/22 1024       Living Environment    People in Home facility resident    Current Living Arrangements assisted living facility    Living Arrangement Comments He lives in assisted living at [facility] [phone].       Discharge Needs Assessment    Equipment Currently Used at Home none    Equipment Needed After Discharge none    Discharge Coordination/Progress He uses no DME at the facility. The staff assists with med administration. Meals and house keeping are provided. Staff assists with personall care as needed.               Discharge Plan     Row Name 09/06/22 1026       Plan    Plan Home at DC    Patient/Family in Agreement with Plan yes    Plan Comments I spoke with the pt, his daughter Florence and son in law in the room. They have no DC needs at this time. The family picks up his prescriptions and takes them to the facility. Family transports. The facility has in house PT/OT if needed. No report needed to return. Will need to fax the DC summary to [phone]. SW to see the daughter per her request.    Final Discharge Disposition Code 01 - home or self-care    Row Name 09/06/22 0820       Plan    Final Discharge Disposition Code 01 - home or self-care              Continued Care and Services - Admitted Since 9/5/2022    Coordination has not been started for this encounter.       Expected Discharge Date and Time     Expected Discharge Date Expected Discharge Time    Sep 8, 2022          Demographic Summary    No documentation.                Functional Status     Row Name 09/06/22 1024       Functional Status    Usual Activity Tolerance moderate       Functional Status, IADL    Medications assistive person    Meal Preparation other (see comments)    Housekeeping other (see comments)    Laundry other (see comments)     Shopping other (see comments)               Psychosocial    No documentation.                Abuse/Neglect    No documentation.                Legal    No documentation.                Substance Abuse    No documentation.                Patient Forms    No documentation.                   Hortensia Ji RN

## 2022-09-06 NOTE — NURSING NOTE
Image guided right thoracentesis performed by MD Morin. 2000 mls removed from pleural space. Patient tolerated well. Report called to nurse. CXR obtained

## 2022-09-06 NOTE — CASE MANAGEMENT/SOCIAL WORK
Continued Stay Note  [facility]     Patient Name: Gwyn Fuchs  MRN: [id number]  Today's Date: 9/6/2022    Admit Date: 9/5/2022     Discharge Plan     Row Name 09/06/22 1226       Plan    Plan Social work met with the patients daughter and provided a financial couseling packet to the patients daughter for assistance with her hospital bill.    Row Name 09/06/22 1026       Plan    Plan Home at DC    Patient/Family in Agreement with Plan yes    Plan Comments I spoke with the pt, his daughter Florence and son in law in the room. They have no DC needs at this time. The family picks up his prescriptions and takes them to the facility. Family transports. The facility has in house PT/OT if needed. No report needed to return. Will need to fax the DC summary to [phone].  to see the daughter per her request.    Final Discharge Disposition Code 01 - home or self-care               Discharge Codes    No documentation.               Expected Discharge Date and Time     Expected Discharge Date Expected Discharge Time    Sep 8, 2022             OFELIA Zimmerman

## 2022-09-06 NOTE — PLAN OF CARE
Patients vitals are stable and he is resting in his room. Patient had chest x-ray,thoracentesis and echo this afternoon, waiting for results.    Problem: Adult Inpatient Plan of Care  Goal: Absence of Hospital-Acquired Illness or Injury  Intervention: Identify and Manage Fall Risk  Recent Flowsheet Documentation  Taken 9/6/2022 1020 by Justine Gonzalez, RN  Safety Promotion/Fall Prevention: patient off unit  Taken 9/6/2022 1015 by Justine Gonzalez, RN  Safety Promotion/Fall Prevention:   safety round/check completed   room organization consistent   nonskid shoes/slippers when out of bed   clutter free environment maintained   assistive device/personal items within reach   fall prevention program maintained  Taken 9/6/2022 0815 by Justine Gonzalez, RN  Safety Promotion/Fall Prevention:   nonskid shoes/slippers when out of bed   room organization consistent   safety round/check completed   clutter free environment maintained   assistive device/personal items within reach  Intervention: Prevent Skin Injury  Recent Flowsheet Documentation  Taken 9/6/2022 1015 by Justine Gonzalez, RN  Body Position: position changed independently  Taken 9/6/2022 0815 by Justine Gonzalez RN  Body Position: position changed independently  Intervention: Prevent and Manage VTE (Venous Thromboembolism) Risk  Recent Flowsheet Documentation  Taken 9/6/2022 1015 by Justine Gonzalez, RN  Activity Management: activity adjusted per tolerance  Taken 9/6/2022 0815 by Justine Gonzalez, RN  Activity Management: activity adjusted per tolerance  Intervention: Prevent Infection  Recent Flowsheet Documentation  Taken 9/6/2022 1015 by Justine Gonzalez, RN  Infection Prevention:   single patient room provided   rest/sleep promoted   hand hygiene promoted   personal protective equipment utilized   equipment surfaces disinfected  Taken 9/6/2022 0815 by Justine Gonzalez, RN  Infection Prevention:   rest/sleep promoted   single patient room provided   personal  protective equipment utilized   hand hygiene promoted   equipment surfaces disinfected  Goal: Optimal Comfort and Wellbeing  Intervention: Provide Person-Centered Care  Recent Flowsheet Documentation  Taken 9/6/2022 15 by Justine Gonzalez, RN  Trust Relationship/Rapport:   care explained   choices provided

## 2022-09-06 NOTE — PRE-SEDATION DOCUMENTATION
Psychiatric   Vascular Interventional Radiology  History & Physicial    Patient Name:Gwyn Fuchs    : 1950    MRN: [id number]    Primary Care Physician: Riley Mckoy DO    Referring Physician: No ref. provider found     Date of admission: 2022    Subjective     Reason for Consult: R humeraa    History of Present Illness     Gwyn Fuchs is a 72 y.o. male referred to IR as noted above.      Active Hospital Problems:  Active Hospital Problems    Diagnosis    • **Recurrent right pleural effusion    • Other cirrhosis of liver (HCC)    • Coronary artery disease involving native coronary artery of native heart without angina pectoris    • HTN (hypertension)    • Type 2 diabetes mellitus without complication, with long-term current use of insulin (HCC)    • Impaired renal function        Personal History     Past Medical History:   Diagnosis Date   • Coronary artery disease    • Diabetes mellitus (HCC)    • Hyperlipidemia    • Hypertension    • Myocardial infarction (HCC)        Past Surgical History:   Procedure Laterality Date   • KNEE SURGERY Right        Family History: His family history includes Cancer in his brother; Heart failure in his father.     Social History: He  reports that he has quit smoking. His smoking use included cigars. He has never used smokeless tobacco. He reports previous alcohol use. He reports that he does not use drugs.    Home Medications:  insulin lispro protamine-insulin lispro, lactulose, levothyroxine, metFORMIN, metoprolol succinate XL, rifAXIMin, simvastatin, and venlafaxine XR    Current Medications:  •  hold  •  acetaminophen **OR** acetaminophen **OR** acetaminophen  •  atorvastatin  •  senna-docusate sodium **AND** polyethylene glycol **AND** bisacodyl **AND** bisacodyl  •  dextrose  •  dextrose  •  glucagon (human recombinant)  •  insulin detemir  •  insulin lispro  •  lactulose  •  levothyroxine  •  metoprolol succinate XL  •  ondansetron **OR** ondansetron  •   "pantoprazole  •  rifAXIMin  •  sodium chloride  •  sodium chloride  •  sodium chloride  •  venlafaxine XR     Allergies:  He is allergic to penicillins.    Review of Systems    Objective     Visit Vitals  /81   Pulse 89   Temp 97.7 °F (36.5 °C) (Oral)   Resp 22   Ht 180.3 cm (71\")   Wt 75.8 kg (167 lb)   SpO2 96%   BMI 23.29 kg/m²        Physical Exam    A&Ox3.   Able to communicate  No Apparent Distress  Average physique       Result Review      I have personally reviewed the results from the time of this admission to 9/6/2022 10:44 EDT and agree with these findings.  [x]  Laboratory  []  Microbiology  [x]  Radiology  []  EKG/Telemetry   []  Cardiology/Vascular   []  Pathology  []  Old records  []  Other:    Most notable findings include: As noted:    Results from last 7 days   Lab Units 09/06/22  0258 09/05/22  1230   INR   --  1.05   HEMOGLOBIN g/dL 10.2* 9.8*   HEMATOCRIT % 32.6* 30.3*   PLATELETS 10*3/mm3 82* 83*       Estimated Creatinine Clearance: 56.8 mL/min (by C-G formula based on SCr of 1.26 mg/dL).   Creatinine   Date Value Ref Range Status   09/06/2022 1.26 0.76 - 1.27 mg/dL Final   09/05/2022 1.46 (H) 0.76 - 1.27 mg/dL Final       No results found for: COVID19     No results found for: PREGTESTUR, PREGSERUM, HCG, HCGQUANT     ASA SCALE ASSESSMENT (applicable if sedation planned):        MALLAMPATI CLASSIFICATION (applicable if sedation planned):       Assessment / Plan     Gwyn Fuchs is a 72 y.o. male referred to the IR service with above problem.    Plan:   As above.    Jerry Morin MD   Vascular Interventional Radiology  09/06/22   10:44 AM EDT     "

## 2022-09-06 NOTE — PLAN OF CARE
Goal Outcome Evaluation:  Plan of Care Reviewed With: patient           Outcome Evaluation: Pt pleasant and agreeable to PT. Pt presents with decreased activity tolerance limiting mobility. Pt performed transfers with supervision and ambulated 250' SBA w/o AD demonstrating decreased gait speed with complaints of mild SOB. Pt with no major mobility deficits warranting PT at this time. Recommend dc back to [facility] or home with assist.

## 2022-09-06 NOTE — PROGRESS NOTES
[facility] Medicine Services  PROGRESS NOTE    Patient Name: Gwyn Fuchs  : 1950  MRN: [id number]    Date of Admission: 2022  Primary Care Physician: Riley Mckoy DO    Subjective   Subjective     CC:  Recurrent pleural effusion    HPI:  Patient got his thoracentesis earlier today.  Feeling better.  Daughter is at bedside report he is little confused and encephalopathic but having BMs with lactulose.     ROS:  General: denies fevers or chills  CV: denies chest pain  Resp: Shortness of breath improved after thoracentesis  Abd: denies abd pain, nausea      Objective   Objective     Vital Signs:   Temp:  [97.7 °F (36.5 °C)-98.2 °F (36.8 °C)] 97.7 °F (36.5 °C)  Heart Rate:  [] 97  Resp:  [18-20] 18  BP: (153-170)/(86-98) 156/89     Physical Exam:  Constitutional: No acute distress, awake, alert  Respiratory: Clear to auscultation bilaterally, respiratory effort normal on room air  Cardiovascular: RRR, no murmurs, rubs, or gallops  Gastrointestinal: Positive bowel sounds, soft, nontender, nondistended  Musculoskeletal: No bilateral ankle edema  Psychiatric: Appropriate affect, cooperative  Neurologic: Oriented x 3, no focal neurological deficits  Skin: No rashes      Results Reviewed:  LAB RESULTS:      Lab 22  0258 22  1230   WBC 3.49 5.02   HEMOGLOBIN 10.2* 9.8*   HEMATOCRIT 32.6* 30.3*   PLATELETS 82* 83*   NEUTROS ABS  --  3.15   IMMATURE GRANS (ABS)  --  0.03   LYMPHS ABS  --  0.77   MONOS ABS  --  0.76   EOS ABS  --  0.29   MCV 90.6 86.8   *  --    PROTIME  --  13.6         Lab 22  0258 22  1230   SODIUM 134* 136   POTASSIUM 3.7 3.9   CHLORIDE 100 101   CO2 23.0 22.0   ANION GAP 11.0 13.0   BUN 13 14   CREATININE 1.26 1.46*   EGFR 60.6 50.8*   GLUCOSE 104* 227*   CALCIUM 8.7 9.0   HEMOGLOBIN A1C  --  9.30*         Lab 22  0258 09/05/22  1230   TOTAL PROTEIN 7.4 7.7   ALBUMIN 2.90* 3.20*   GLOBULIN 4.5 4.5   ALT (SGPT) 67* 58*    AST (SGOT) 108* 109*   BILIRUBIN 0.3 0.2   ALK PHOS 151* 146*         Lab 09/05/22  1230   PROBNP 332.2   TROPONIN T 0.012   PROTIME 13.6   INR 1.05                 Brief Urine Lab Results     None          Microbiology Results Abnormal     None          XR Chest 1 View    Result Date: 9/6/2022  DATE OF EXAM: 9/6/2022 8:28 AM  PROCEDURE: XR CHEST 1 VW-  INDICATIONS: evaluate effusion; V62-Rwghfvr effusion, not elsewhere classified; R06.02-Shortness of breath  COMPARISON: One day prior  TECHNIQUE: Single radiographic AP view of the chest was obtained.  FINDINGS: There is unchanged dense opacity at the right lung base, favoring component of unchanged moderate to large effusion and volume loss. Some component of elevation of the hemidiaphragm is possible, difficult to evaluate in the absence of more remote comparison exams. The left lung remains clear. There is no pneumothorax. Unchanged mild cardiac enlargement.      Impression: There is unchanged dense opacity at the right lung base, favoring component of unchanged moderate to large effusion and volume loss. Some component of elevation of the hemidiaphragm is possible, difficult to evaluate in the absence of more remote comparison exams. The left lung remains clear. There is no pneumothorax. Unchanged mild cardiac enlargement.  This report was finalized on 9/6/2022 9:01 AM by Chuy Hollingsworth.      XR Chest 1 View    Result Date: 9/5/2022   DATE OF EXAM: 9/5/2022 11:45 AM  PROCEDURE: XR CHEST 1 VW-  INDICATIONS: SOA triage protocol  COMPARISON: No Comparisons Available  TECHNIQUE: Portable Chest  FINDINGS:  Study is limited by overlying support and monitoring apparatus  There is a large right-sided pleural effusion suspected. Vascular crowding and dependent atelectasis noted at the right base. Fluid is noted within the fissure. Left lung appears grossly clear. Attenuation heart size is limited by pleural effusion. Mediastinum appears grossly unremarkable on the  left. Osseous structures demonstrate no acute abnormality      Impression: IMPRESSION : Large right-sided pleural effusion with associated vascular crowding and dependent atelectasis on the right.  Left lung appears grossly clear[  This report was finalized on 9/5/2022 12:14 PM by Godwin Huston.            I have reviewed the medications:  Scheduled Meds:hold, 1 each, Does not apply, Q12H  atorvastatin, 10 mg, Oral, Nightly  insulin detemir, 15 Units, Subcutaneous, Q12H  insulin lispro, 0-7 Units, Subcutaneous, TID AC  lactulose, 10 g, Oral, Daily  levothyroxine, 75 mcg, Oral, Q AM  metoprolol succinate XL, 50 mg, Oral, Q24H  pantoprazole, 40 mg, Oral, Q AM  rifAXIMin, 550 mg, Oral, Q12H  senna-docusate sodium, 2 tablet, Oral, BID  sodium chloride, 10 mL, Intravenous, Q12H  venlafaxine XR, 150 mg, Oral, Daily With Breakfast      Continuous Infusions:   PRN Meds:.acetaminophen **OR** acetaminophen **OR** acetaminophen  •  senna-docusate sodium **AND** polyethylene glycol **AND** bisacodyl **AND** bisacodyl  •  dextrose  •  dextrose  •  glucagon (human recombinant)  •  ondansetron **OR** ondansetron  •  sodium chloride  •  sodium chloride    Assessment & Plan   Assessment & Plan     Active Hospital Problems    Diagnosis  POA   • **Recurrent right pleural effusion [J90]  Unknown   • Other cirrhosis of liver (HCC) [K74.69]  Yes   • Coronary artery disease involving native coronary artery of native heart without angina pectoris [I25.10]  Yes   • HTN (hypertension) [I10]  Yes   • Type 2 diabetes mellitus without complication, with long-term current use of insulin (HCC) [E11.9, Z79.4]  Not Applicable   • Impaired renal function [N28.9]  Yes      Resolved Hospital Problems   No resolved problems to display.        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  - S/p large-volume thoracentesis at OSH 8/18, 8/24; via [facility] on an outpatient basis; unclear labs/Dx  -Chest x-ray on admission shows large right-sided pleural effusion with vascular crowding and dependent atelectasis.  -IR thoracentesis today with labs, cytology. Labs are currently bending.  -echo today  - obtain records from [facility]. Sounds like they thought it was hepatic hydrothorax and were considering placement of a pleurex.  - Discussed with Becky Tomlin with GI, they will see him in consult. Need to decided if he is a candidate for TIPS but this may make his encephalopathy worse.   - Discussed with daughter at the bedside. They would like to be called with updates because of his confusion/encephalopathy.      LEZAMA cirrhosis w/ anemia/thrombocytopenia  -Home Xifaxan, lactulose, PPI  -AST/ALT elevated and stable.  Sodium 134.  INR within normal limits.  -Noted to be on home lactulose, continue  - Not currently on any diuretics     CAD s/p remote stenting  Hx MI  Hypertension  Hyperlipidemia  - Home Toprol-XL, statin  - Last known [facility] 2015, no intervention, had previously had stenting  -Previously followed by Dr. Osorio, last appointment ~2015?  -Data deficit for lack of statin use     DM type 2, unknown A1c, with long-term use of insulin  -Check A1c  - Baseline takes oral metformin and basal insulin 30U qAM / 25U qPM  - Levemir ordered w/ Accu-Cheks and SSI     Impaired renal function, data deficit   -Cr 1.46 was noted on presentation, improved.       DVT prophylaxis: SCDs    DVT prophylaxis:  Mechanical DVT prophylaxis orders are present.     AM-PAC 6 Clicks Score (PT): 24 (09/06/22 7714)    CODE STATUS:   Code Status and Medical Interventions:   Ordered at:  09/05/22 1624     Code Status (Patient has no pulse and is not breathing):    CPR (Attempt to Resuscitate)     Medical Interventions (Patient has pulse or is breathing):    Full Support     This patient's problems and plans were partially entered by my partner and updated as appropriate by me 09/06/22. Today is my first day evaluating this patient's active medical problems. I Personally reviewed chart and adjusted note to reflect daily changes in management/clinical condition      Juana Shrestha MD  09/06/22

## 2022-09-06 NOTE — PAYOR COMM NOTE
"Silverioalberta Sylvia JACEK (72 y.o. Male)             Date of Birth   1950    Social Security Number       Address   [address] [place] [address]    Home Phone   [phone]    MRN   [id number]       Quaker   None    Marital Status                               Admission Date   9/5/22    Admission Type   Emergency    Admitting Provider   Juana Shrestha MD    Attending Provider   Juana Shrestha MD    Department, Room/Bed   [address], S519/1       Discharge Date       Discharge Disposition       Discharge Destination                               Attending Provider: Juana Shrestha MD    Allergies: Penicillins    Isolation: None   Infection: None   Code Status: CPR   Advance Care Planning Activity    Ht: 180.3 cm (71\")   Wt: 75.8 kg (167 lb)    Admission Cmt: None   Principal Problem: Recurrent right pleural effusion [J90]                 Active Insurance as of 9/5/2022     Primary Coverage     Payor Plan Insurance Group Employer/Plan Group    ANTHEM MEDICARE REPLACEMENT ANTHEM MEDICARE ADVANTAGE [id number]     Payor Plan Address Payor Plan Phone Number Payor Plan Fax Number Effective Dates    [address] [phone]  1/1/2016 - None Entered    [facility] [address]       Subscriber Name Subscriber Birth Date Member ID       SYLVIA FUCHS 1950 [id number]                 Emergency Contacts      (Rel.) Home Phone Work Phone Mobile Phone    CATRACHITO HARP (Daughter) [phone] -- --    RACHELALEKSANDRA (Daughter) [phone] -- --    JAZIEL FUCHS (Brother) [phone] -- --            [facility] [id number] Tax ID [id number]  Insurance Information                ANTHEM MEDICARE REPLACEMENT/ANTHEM MEDICARE ADVANTAGE Phone: [phone]    Subscriber: Sylvia Fuchs Subscriber#: [id number]    Group#: [id number] Precert#: [id number]             History & Physical      Reid Warren DO at 09/05/22 1630              [facility] " Medicine Services  HISTORY AND PHYSICAL    Patient Name: Gwyn Fuchs  : 1950  MRN: [id number]  Primary Care Physician: Riley Mckoy DO  Date of admission: 2022      Subjective   Subjective     Chief Complaint:  Shortness of breath, cough    HPI:  Gwyn Fuchs is a 72 y.o. male w/ LEZAMA cirrhosis, CAD s/p remote stenting, prior cigar use who presents to the ED with his daughter at bedside for evaluation of recurrent pleural effusion.  He developed orthopnea, exertional dyspnea, intermittent cough (sometimes productive of scant clear sputum) 3+ months ago. His PCP identified a large RT pleural effusion and referred him to pulmonology at [facility], on 22 and again 22 he underwent large-volume thoracentesis with ~2L fluid removal each time.  Per the patient/daughter they are unclear of any formal diagnosis or laboratory studies being sent.  His fluid has been quickly reaccumulating with recurrence of his symptoms, they felt frustrated they were not making answers and presented to our ED for further evaluation.  He denies fever, chill, weight loss, hemoptysis, wheezing, abdominal distention, peripheral edema.    He was previously followed with Dr. Zay Osorio, unclear last appointment, potentially .  Family took away his car keys and moved him into assisted living earlier this year for gradual memory/cognitive issues.      Review of Systems   Gen- No fevers, chills  CV- No chest pain, palpitations  Resp-yes cough, dyspnea  GI- No N/V/D, abd pain  All other systems reviewed and are negative.     Personal History     Past Medical History:   Diagnosis Date   • Coronary artery disease    • Diabetes mellitus (HCC)    • Hyperlipidemia    • Hypertension    • Myocardial infarction (HCC)              Past Surgical History:   Procedure Laterality Date   • KNEE SURGERY Right        Family History:  family history includes Cancer in his brother; Heart failure in his father. Otherwise pertinent  "FHx was reviewed and unremarkable.     Social History:  reports that he has quit smoking. His smoking use included cigars. He has never used smokeless tobacco. He reports previous alcohol use. He reports that he does not use drugs.  Social History     Social History Narrative    Hx of \"social\" cigar use; prior EtOH use of 2-3 beers every other weekend; early 2022 moved into assisted living and family took away his car keys for early memory issues       Medications:  Available home medication information reviewed.  (Not in a hospital admission)      Allergies   Allergen Reactions   • Penicillins        Objective   Objective     Vital Signs:   Temp:  [98.1 °F (36.7 °C)] 98.1 °F (36.7 °C)  Heart Rate:  [] 92  Resp:  [20] 20  BP: (153-170)/(86-98) 165/96       Physical Exam   Constitutional: Awake, alert, no acute distress lying on ED stretcher  Eyes: PERRL, sclerae anicteric, no conjunctival injection  HENT: NCAT, mucous membranes moist  Neck: Supple, trachea midline  Respiratory: Diminished/absent breath sounds over RT middle and lower lung fields, otherwise CTAB, respiratory effort normal  Cardiovascular: RRR, no murmurs, rubs, or gallops, palpable pedal pulses bilaterally  Gastrointestinal: Positive bowel sounds, soft, nontender, nondistended  Musculoskeletal: No bilateral ankle edema, no clubbing or cyanosis to extremities  Psychiatric: Appropriate affect, cooperative  Neurologic: Speech clear and fluent, moving all extremities spontaneously    Result Review:  I have personally reviewed the results from the time of this admission to 9/5/2022 16:30 EDT and agree with these findings:  []  Laboratory list / accordion  []  Microbiology  [x]  Radiology  []  EKG/Telemetry   []  Cardiology/Vascular   []  Pathology  []  Old records  []  Other:  Most notable findings include: Large RT pleural effusion        LAB RESULTS:      Lab 09/05/22  1230   WBC 5.02   HEMOGLOBIN 9.8*   HEMATOCRIT 30.3*   PLATELETS 83*   NEUTROS " ABS 3.15   IMMATURE GRANS (ABS) 0.03   LYMPHS ABS 0.77   MONOS ABS 0.76   EOS ABS 0.29   MCV 86.8         Lab 09/05/22  1230   SODIUM 136   POTASSIUM 3.9   CHLORIDE 101   CO2 22.0   ANION GAP 13.0   BUN 14   CREATININE 1.46*   EGFR 50.8*   GLUCOSE 227*   CALCIUM 9.0         Lab 09/05/22  1230   TOTAL PROTEIN 7.7   ALBUMIN 3.20*   GLOBULIN 4.5   ALT (SGPT) 58*   AST (SGOT) 109*   BILIRUBIN 0.2   ALK PHOS 146*         Lab 09/05/22  1230   PROBNP 332.2   TROPONIN T 0.012                     Microbiology Results (last 10 days)     ** No results found for the last 240 hours. **          XR Chest 1 View    Result Date: 9/5/2022   DATE OF EXAM: 9/5/2022 11:45 AM  PROCEDURE: XR CHEST 1 VW-  INDICATIONS: SOA triage protocol  COMPARISON: No Comparisons Available  TECHNIQUE: Portable Chest  FINDINGS:  Study is limited by overlying support and monitoring apparatus  There is a large right-sided pleural effusion suspected. Vascular crowding and dependent atelectasis noted at the right base. Fluid is noted within the fissure. Left lung appears grossly clear. Attenuation heart size is limited by pleural effusion. Mediastinum appears grossly unremarkable on the left. Osseous structures demonstrate no acute abnormality      Impression: IMPRESSION : Large right-sided pleural effusion with associated vascular crowding and dependent atelectasis on the right.  Left lung appears grossly clear[  This report was finalized on 9/5/2022 12:14 PM by Gdowin Huston.            Assessment & Plan   Assessment & Plan     Active Hospital Problems    Diagnosis  POA   • **Recurrent right pleural effusion [J90]  Unknown   • Other cirrhosis of liver (HCC) [K74.69]  Yes   • Coronary artery disease involving native coronary artery of native heart without angina pectoris [I25.10]  Yes   • HTN (hypertension) [I10]  Yes   • Type 2 diabetes mellitus without complication, with long-term current use of insulin (HCC) [E11.9, Z79.4]  Not Applicable   • Impaired  renal function [N28.9]  Yes     Summary: This is a 72-year-old male with Lezama cirrhosis, CAD s/p prior MI and stenting, DM2 who was initially evaluated outpatient for progressive orthopnea/DHILLON and identified to have large pleural effusion, s/p x2 thoracentesis at OSH with unclear laboratory testing/results, presenting to our facility with recurrent symptoms and large pleural effusion    (Per medication list at the bedside patient's home medicines are as follows: Rifaximin 550 mg BID, lactulose (titrate for 2-3 small bowel movements), levothyroxine 75 mcg qam, Toprol-XL 50 mg da, omeprazole 20 mg da, venlafaxine 150 mg da, metformin (unsure dose), simvastatin 40 mg qhs, basal insulin 30U qam / 25U qhs)    Assessment/plan    Recurrent large RT pleural effusion, unclear etiology  -S/p large-volume thoracentesis at OSH 8/18, 8/24; via [facility] pul on an outpatient basis; unclear labs/Dx  -Check echo  -Sputum culture if able to provide  - Orders placed for IR guided RT thoracentesis with associated labs, cytology, etc.  - Recommend postthoracentesis CXR PA-LAT    LEZAMA cirrhosis w/ anemia/thrombocytopenia  -Home Xifaxan, lactulose, PPI  - Not currently on any diuretics  - Pending thoracentesis labs, consider abd US for evaluation of ascites (clinically not distended)  -Add on INR    CAD s/p remote stenting  Hx MI  Hypertension  Hyperlipidemia  - Home Toprol-XL, statin  - Last known [facility] 2015, no intervention, had previously had stenting  -Previously followed by Dr. Osorio, last appointment ~2015?  -Data deficit for lack of statin use    DM type 2, unknown A1c, with long-term use of insulin  -Check A1c  - Baseline takes oral metformin and basal insulin 30U qAM / 25U qPM  - Levemir ordered w/ Accu-Cheks and SSI    Impaired renal function, data deficit  -Cr 1.46, eGFR 51; no other comparable labs since 2015, repeat chemistry panel in the a.m.      DVT prophylaxis: SCDs      CODE STATUS:    Code Status and Medical  Interventions:   Ordered at: 09/05/22 1624     Code Status (Patient has no pulse and is not breathing):    CPR (Attempt to Resuscitate)     Medical Interventions (Patient has pulse or is breathing):    Full Support         Reid Warren DO  09/05/22      Electronically signed by Reid Warren DO at 09/05/22 1648          Emergency Department Notes      Ray Hayden MD at 09/05/22 1448          Subjective   Mr. Fuchs presents with several days of shortness of breath.  He notes coughing as well.  Worse when he lays flat.  He tells me he feels like he cannot get a deep breath.  He has been seen recently at [facility] for this.  He has had a right-sided pleural effusion drained.  Neither he nor his daughter know the etiology of the effusion.      History provided by:  Patient and relative  Shortness of Breath  Severity:  Moderate  Onset quality:  Gradual  Timing:  Constant  Progression:  Worsening  Chronicity:  Recurrent  Context: activity    Context comment:  With new onset pleural effusion  Relieved by:  Nothing  Worsened by:  Activity (Supine position)  Ineffective treatments: Thoracentesis at [facility].  Associated symptoms: cough    Associated symptoms: no abdominal pain, no chest pain and no fever        Review of Systems   Constitutional: Negative for chills and fever.   Respiratory: Positive for cough and shortness of breath.    Cardiovascular: Negative for chest pain.   Gastrointestinal: Negative for abdominal distention and abdominal pain.   Skin: Negative for color change.   All other systems reviewed and are negative.      Past Medical History:   Diagnosis Date   • Coronary artery disease    • Diabetes mellitus (HCC)    • Hyperlipidemia    • Hypertension    • Myocardial infarction (HCC)        Allergies   Allergen Reactions   • Penicillins        Past Surgical History:   Procedure Laterality Date   • KNEE SURGERY Right        History reviewed. No pertinent family  history.    Social History     Socioeconomic History   • Marital status:    Tobacco Use   • Smoking status: Never Smoker   Substance and Sexual Activity   • Alcohol use: Yes     Comment: 2-3 drinks of vodka weekly   • Drug use: No           Objective   Physical Exam  Vitals and nursing note reviewed.   Constitutional:       General: He is not in acute distress.     Appearance: Normal appearance.   HENT:      Head: Normocephalic and atraumatic.      Nose: Nose normal. No congestion or rhinorrhea.      Mouth/Throat:      Mouth: Mucous membranes are moist.      Pharynx: No posterior oropharyngeal erythema.   Eyes:      General: No scleral icterus.     Conjunctiva/sclera: Conjunctivae normal.   Neck:      Comments: No JVD  Cardiovascular:      Rate and Rhythm: Normal rate and regular rhythm.      Heart sounds: No murmur heard.    No friction rub.   Pulmonary:      Effort: Pulmonary effort is normal. No respiratory distress.      Breath sounds: Examination of the right-upper field reveals decreased breath sounds. Examination of the right-middle field reveals decreased breath sounds. Examination of the right-lower field reveals decreased breath sounds. Decreased breath sounds present. No wheezing or rales.   Abdominal:      General: Bowel sounds are normal.      Palpations: Abdomen is soft.      Tenderness: There is no abdominal tenderness. There is no guarding or rebound.   Musculoskeletal:         General: No tenderness.      Cervical back: Normal range of motion and neck supple.      Right lower leg: No edema.      Left lower leg: No edema.   Skin:     General: Skin is warm and dry.      Capillary Refill: Capillary refill takes less than 2 seconds.      Coloration: Skin is not pale.   Neurological:      General: No focal deficit present.      Mental Status: He is alert and oriented to person, place, and time.      Coordination: Coordination normal.   Psychiatric:         Mood and Affect: Mood normal.          Behavior: Behavior normal.         Thought Content: Thought content normal.         Procedures          ED Course  ED Course as of 09/05/22 1559   Mon Sep 05, 2022   0764 I spoke with him about findings.  He does not know what his baseline hemoglobin or creatinine is.  The last labs we have were from 7 years ago however.  He reports worsening dyspnea on exertion.  He and his family voiced frustration that they do not know what is causing the effusion and he has not been scheduled to have it drained again.  Have reviewed his chest x-ray and will discuss with the hospitalist on-call. [DT]      ED Course User Index  [DT] Ray Hayden MD                                           MDM  Number of Diagnoses or Management Options  Recurrent right pleural effusion: established and worsening  Shortness of breath: new and requires workup     Amount and/or Complexity of Data Reviewed  Clinical lab tests: reviewed and ordered  Tests in the radiology section of CPT®: ordered and reviewed  Tests in the medicine section of CPT®: reviewed  Decide to obtain previous medical records or to obtain history from someone other than the patient: yes  Obtain history from someone other than the patient: yes  Review and summarize past medical records: yes  Discuss the patient with other providers: yes  Independent visualization of images, tracings, or specimens: yes    Patient Progress  Patient progress: improved      Final diagnoses:   Recurrent right pleural effusion   Shortness of breath       ED Disposition  ED Disposition     ED Disposition   Decision to Admit    Condition   --    Comment   Level of Care: Telemetry [5]   Diagnosis: Recurrent right pleural effusion [806442]   Admitting Physician: NATA LOYA [[id number]]   Attending Physician: NATA LOYA [[id number]]               No follow-up provider specified.       Medication List      No changes were made to your prescriptions during this visit.          Ray Hayden,  MD  09/05/22 1559      Electronically signed by Ray Hayden MD at 09/05/22 1559       Vital Signs (last day)     Date/Time Temp Temp src Pulse Resp BP Patient Position SpO2    09/06/22 10:34:20 -- -- 89 22 159/81 -- 96    09/06/22 10:29:57 -- -- 85 20 159/81 -- 95    09/06/22 0900 -- -- 97 -- -- -- --    09/06/22 0700 97.7 (36.5) Oral 77 18 156/89 Sitting --    09/06/22 0500 -- -- 79 -- -- -- --    09/06/22 0253 98 (36.7) Oral 87 20 153/93 Lying 90    09/06/22 0100 -- -- 91 -- -- -- --    09/05/22 2356 98.2 (36.8) Oral 88 18 155/95 Lying 94    09/05/22 2100 -- -- 106 -- -- -- --    09/05/22 1908 98.1 (36.7) Oral 91 18 161/89 Lying 92    09/05/22 1842 -- -- 91 -- 161/92 Lying --    09/05/22 1500 -- -- 92 -- 165/96 -- 93    09/05/22 1430 -- -- 93 -- 166/98 -- 93    09/05/22 1330 -- -- 94 -- 163/90 -- 92    09/05/22 1230 -- -- 96 -- 161/92 -- 93    09/05/22 1200 -- -- 98 -- 170/94 -- 95    09/05/22 1128 98.1 (36.7) Oral 102 20 153/86 Sitting 94            Current Facility-Administered Medications   Medication Dose Route Frequency Provider Last Rate Last Admin   • !HOLD ANTICOAGULATION for 24 hours before thoracentesis  1 each Does not apply Q12H Reid Warren DO       • acetaminophen (TYLENOL) tablet 500 mg  500 mg Oral Q6H PRN Reid Warren DO        Or   • acetaminophen (TYLENOL) 160 MG/5ML solution 500 mg  500 mg Oral Q6H PRN Reid Warren DO        Or   • acetaminophen (TYLENOL) suppository 325 mg  325 mg Rectal Q6H PRN Reid Warren DO       • atorvastatin (LIPITOR) tablet 10 mg  10 mg Oral Nightly Reid Warren DO   10 mg at 09/05/22 2104   • sennosides-docusate (PERICOLACE) 8.6-50 MG per tablet 2 tablet  2 tablet Oral BID Reid Warren DO   2 tablet at 09/06/22 0756    And   • polyethylene glycol (MIRALAX) packet 17 g  17 g Oral Daily PRN Reid Warren DO        And   • bisacodyl (DULCOLAX) EC tablet 5 mg  5 mg Oral Daily PRN Reid Warren DO         And   • bisacodyl (DULCOLAX) suppository 10 mg  10 mg Rectal Daily PRN Reid Warren DO       • dextrose (D50W) (25 g/50 mL) IV injection 25 g  25 g Intravenous Q15 Min PRN Reid Warren DO       • dextrose (GLUTOSE) oral gel 15 g  15 g Oral Q15 Min PRN Reid Warren DO       • glucagon (human recombinant) (GLUCAGEN DIAGNOSTIC) injection 1 mg  1 mg Intramuscular Q15 Min PRN Reid Warren DO       • insulin detemir (LEVEMIR) injection 15 Units  15 Units Subcutaneous Q12H Reid Warren DO   15 Units at 09/06/22 0819   • Insulin Lispro (humaLOG) injection 0-7 Units  0-7 Units Subcutaneous TID AC Reid Warren DO       • lactulose (CHRONULAC) 10 GM/15ML solution 10 g  10 g Oral Daily Reid Warren DO   10 g at 09/06/22 0756   • levothyroxine (SYNTHROID, LEVOTHROID) tablet 75 mcg  75 mcg Oral Q AM Reid Warren DO   75 mcg at 09/06/22 0558   • metoprolol succinate XL (TOPROL-XL) 24 hr tablet 50 mg  50 mg Oral Q24H Reid Warren DO   50 mg at 09/06/22 0754   • ondansetron (ZOFRAN) tablet 4 mg  4 mg Oral Q6H PRN Reid Warren DO        Or   • ondansetron (ZOFRAN) injection 4 mg  4 mg Intravenous Q6H PRN Reid Warren DO       • pantoprazole (PROTONIX) EC tablet 40 mg  40 mg Oral Q AM Reid Warren DO   40 mg at 09/06/22 0558   • riFAXIMin (XIFAXAN) tablet 550 mg  550 mg Oral Q12H Reid Warren DO   550 mg at 09/06/22 0758   • sodium chloride 0.9 % flush 10 mL  10 mL Intravenous PRN Ray Hayden MD       • sodium chloride 0.9 % flush 10 mL  10 mL Intravenous Q12H Reid Warren DO   10 mL at 09/06/22 0758   • sodium chloride 0.9 % flush 10 mL  10 mL Intravenous PRN Reid Warren DO       • venlafaxine XR (EFFEXOR-XR) 24 hr capsule 150 mg  150 mg Oral Daily With Breakfast Reid Warren DO   150 mg at 09/06/22 0756       Lab Results (last 24 hours)     Procedure Component Value Units  Date/Time    POC Glucose Once [[id number]]  (Normal) Collected: 09/06/22 0717    Specimen: Blood Updated: 09/06/22 0719     Glucose 109 mg/dL      Comment: Meter: [id number] : [id number] Albert Christian       Lactate Dehydrogenase [[id number]]  (Abnormal) Collected: 09/06/22 0258    Specimen: Blood Updated: 09/06/22 0358      U/L     Comprehensive Metabolic Panel [[id number]]  (Abnormal) Collected: 09/06/22 0258    Specimen: Blood Updated: 09/06/22 0357     Glucose 104 mg/dL      BUN 13 mg/dL      Creatinine 1.26 mg/dL      Sodium 134 mmol/L      Potassium 3.7 mmol/L      Comment: Slight hemolysis detected by analyzer. Results may be affected.        Chloride 100 mmol/L      CO2 23.0 mmol/L      Calcium 8.7 mg/dL      Total Protein 7.4 g/dL      Albumin 2.90 g/dL      ALT (SGPT) 67 U/L      AST (SGOT) 108 U/L      Alkaline Phosphatase 151 U/L      Total Bilirubin 0.3 mg/dL      Globulin 4.5 gm/dL      Comment: Calculated Result        A/G Ratio 0.6 g/dL      BUN/Creatinine Ratio 10.3     Anion Gap 11.0 mmol/L      eGFR 60.6 mL/min/1.73      Comment: National Kidney Foundation and American Society of Nephrology (ASN) Task Force recommended calculation based on the Chronic Kidney Disease Epidemiology Collaboration (CKD-EPI) equation refit without adjustment for race.       Narrative:      GFR Normal >60  Chronic Kidney Disease <60  Kidney Failure <15      CBC (No Diff) [[id number]]  (Abnormal) Collected: 09/06/22 0258    Specimen: Blood Updated: 09/06/22 0343     WBC 3.49 10*3/mm3      RBC 3.60 10*6/mm3      Hemoglobin 10.2 g/dL      Hematocrit 32.6 %      MCV 90.6 fL      MCH 28.3 pg      MCHC 31.3 g/dL      RDW 16.0 %      RDW-SD 52.9 fl      MPV 13.0 fL      Platelets 82 10*3/mm3     POC Glucose Once [[id number]]  (Normal) Collected: 09/05/22 1659    Specimen: Blood Updated: 09/05/22 1701     Glucose 89 mg/dL      Comment: Meter: [id number] : [id number] Domenic Flowers       Hemoglobin A1c [[id number]]   (Abnormal) Collected: 09/05/22 1230    Specimen: Blood Updated: 09/05/22 1700     Hemoglobin A1C 9.30 %     Narrative:      Hemoglobin A1C Ranges:    Increased Risk for Diabetes  5.7% to 6.4%  Diabetes                     >= 6.5%  Diabetic Goal                < 7.0%    Protime-INR [[id number]]  (Normal) Collected: 09/05/22 1230    Specimen: Blood Updated: 09/05/22 1652     Protime 13.6 Seconds      INR 1.05    [place] Draw [[id number]] Collected: 09/05/22 1230    Specimen: Blood Updated: 09/05/22 1633    Narrative:      The following orders were created for panel order [place] Draw.  Procedure                               Abnormality         Status                     ---------                               -----------         ------                     Green Top (Gel)[[id number]]                                   Final result               Lavender Top[[id number]]                                      Final result               Gold Top - SST[[id number]]                                    Final result               Polanco Top[[id number]]                                          Final result               Light Blue Top[[id number]]                                    Final result                 Please view results for these tests on the individual orders.    Gray Top [[id number]] Collected: 09/05/22 1230    Specimen: Blood Updated: 09/05/22 1633     Extra Tube Hold for add-ons.     Comment: Auto resulted.       Lavender Top [[id number]] Collected: 09/05/22 1230    Specimen: Blood Updated: 09/05/22 1333     Extra Tube hold for add-on     Comment: Auto resulted       Light Blue Top [[id number]] Collected: 09/05/22 1230    Specimen: Blood Updated: 09/05/22 1333     Extra Tube Hold for add-ons.     Comment: Auto resulted       Green Top (Gel) [[id number]] Collected: 09/05/22 1230    Specimen: Blood Updated: 09/05/22 1333     Extra Tube Hold for add-ons.     Comment: Auto resulted.       Gold Top - SST [[id number]] Collected: 09/05/22 1230     Specimen: Blood Updated: 09/05/22 1333     Extra Tube Hold for add-ons.     Comment: Auto resulted.       Comprehensive Metabolic Panel [[id number]]  (Abnormal) Collected: 09/05/22 1230    Specimen: Blood Updated: 09/05/22 1304     Glucose 227 mg/dL      BUN 14 mg/dL      Creatinine 1.46 mg/dL      Sodium 136 mmol/L      Potassium 3.9 mmol/L      Chloride 101 mmol/L      CO2 22.0 mmol/L      Calcium 9.0 mg/dL      Total Protein 7.7 g/dL      Albumin 3.20 g/dL      ALT (SGPT) 58 U/L      AST (SGOT) 109 U/L      Alkaline Phosphatase 146 U/L      Total Bilirubin 0.2 mg/dL      Globulin 4.5 gm/dL      Comment: Calculated Result        A/G Ratio 0.7 g/dL      BUN/Creatinine Ratio 9.6     Anion Gap 13.0 mmol/L      eGFR 50.8 mL/min/1.73      Comment: National Kidney Foundation and American Society of Nephrology (ASN) Task Force recommended calculation based on the Chronic Kidney Disease Epidemiology Collaboration (CKD-EPI) equation refit without adjustment for race.       Narrative:      GFR Normal >60  Chronic Kidney Disease <60  Kidney Failure <15      Troponin [[id number]]  (Normal) Collected: 09/05/22 1230    Specimen: Blood Updated: 09/05/22 1304     Troponin T 0.012 ng/mL     Narrative:      Troponin T Reference Range:  <= 0.03 ng/mL-   Negative for AMI  >0.03 ng/mL-     Abnormal for myocardial necrosis.  Clinicians would have to utilize clinical acumen, EKG, Troponin and serial changes to determine if it is an Acute Myocardial Infarction or myocardial injury due to an underlying chronic condition.       Results may be falsely decreased if patient taking Biotin.      BNP [[id number]]  (Normal) Collected: 09/05/22 1230    Specimen: Blood Updated: 09/05/22 1302     proBNP 332.2 pg/mL     Narrative:      Among patients with dyspnea, NT-proBNP is highly sensitive for the detection of acute congestive heart failure. In addition NT-proBNP of <300 pg/ml effectively rules out acute congestive heart failure with 99% negative  predictive value.    Results may be falsely decreased if patient taking Biotin.      CBC & Differential [[id number]]  (Abnormal) Collected: 09/05/22 1230    Specimen: Blood Updated: 09/05/22 1252    Narrative:      The following orders were created for panel order CBC & Differential.  Procedure                               Abnormality         Status                     ---------                               -----------         ------                     CBC Auto Differential[[id number]]         Abnormal            Final result                 Please view results for these tests on the individual orders.    CBC Auto Differential [[id number]]  (Abnormal) Collected: 09/05/22 1230    Specimen: Blood Updated: 09/05/22 1252     WBC 5.02 10*3/mm3      RBC 3.49 10*6/mm3      Hemoglobin 9.8 g/dL      Hematocrit 30.3 %      MCV 86.8 fL      MCH 28.1 pg      MCHC 32.3 g/dL      RDW 16.2 %      RDW-SD 51.0 fl      MPV 12.4 fL      Platelets 83 10*3/mm3      Neutrophil % 62.8 %      Lymphocyte % 15.3 %      Monocyte % 15.1 %      Eosinophil % 5.8 %      Basophil % 0.4 %      Immature Grans % 0.6 %      Neutrophils, Absolute 3.15 10*3/mm3      Lymphocytes, Absolute 0.77 10*3/mm3      Monocytes, Absolute 0.76 10*3/mm3      Eosinophils, Absolute 0.29 10*3/mm3      Basophils, Absolute 0.02 10*3/mm3      Immature Grans, Absolute 0.03 10*3/mm3      nRBC 0.0 /100 WBC         Imaging Results (Last 24 Hours)     Procedure Component Value Units Date/Time    XR Chest 1 View [[id number]] Collected: 09/06/22 0859     Updated: 09/06/22 0904    Narrative:      DATE OF EXAM: 9/6/2022 8:28 AM     PROCEDURE: XR CHEST 1 VW-     INDICATIONS: evaluate effusion; [id number] effusion, not elsewhere  classified; R06.02-Shortness of breath     COMPARISON: One day prior     TECHNIQUE: Single radiographic AP view of the chest was obtained.     FINDINGS:  There is unchanged dense opacity at the right lung base, favoring  component of unchanged moderate to large  effusion and volume loss. Some  component of elevation of the hemidiaphragm is possible, difficult to  evaluate in the absence of more remote comparison exams. The left lung  remains clear. There is no pneumothorax. Unchanged mild cardiac  enlargement.       Impression:      There is unchanged dense opacity at the right lung base, favoring  component of unchanged moderate to large effusion and volume loss. Some  component of elevation of the hemidiaphragm is possible, difficult to  evaluate in the absence of more remote comparison exams. The left lung  remains clear. There is no pneumothorax. Unchanged mild cardiac  enlargement.     This report was finalized on 9/6/2022 9:01 AM by Chuy Hollingsworth.       XR Chest 1 View [[id number]] Collected: 09/05/22 1210     Updated: 09/05/22 1217    Narrative:         DATE OF EXAM:   9/5/2022 11:45 AM     PROCEDURE:   XR CHEST 1 VW-     INDICATIONS:   SOA triage protocol     COMPARISON:  No Comparisons Available     TECHNIQUE:   Portable Chest     FINDINGS:      Study is limited by overlying support and monitoring apparatus     There is a large right-sided pleural effusion suspected. Vascular  crowding and dependent atelectasis noted at the right base. Fluid is  noted within the fissure. Left lung appears grossly clear. Attenuation  heart size is limited by pleural effusion. Mediastinum appears grossly  unremarkable on the left. Osseous structures demonstrate no acute  abnormality       Impression:      IMPRESSION :   Large right-sided pleural effusion with associated vascular crowding and  dependent atelectasis on the right.     Left lung appears grossly clear[     This report was finalized on 9/5/2022 12:14 PM by Godwin Huston.           Orders (last 24 hrs)      Start     Ordered    09/06/22 1052  Inpatient Admission  Once         09/06/22 1053    09/06/22 0943  Obtain Medical Records  Until Discontinued        Comments: [facility], recent labs from the past month and any  progress notes    22 0943    22 0854  PT Plan of Care Cert / Re-Cert  Once        Comments: Physical Therapy Plan of Care  Initial Certification  Certification Period: 2022 - 2022    Patient Name: Gwyn Fuchs  : 1950    (J90) Recurrent right pleural effusion  (primary encounter diagnosis)  (R06.02) Shortness of breath                  Assessment  PT Assessment  Criteria for Skilled Interventions Met (PT): no, no problems identified which require skilled intervention                  Plan  PT Plan  Outcome Evaluation: Pt pleasant and agreeable to PT. Pt presents with dec ecreased activity tolerance limiting mobility. Pt performed transfers with supervision and ambulated 250' SBA w/o AD demonstrating decreased gait speed with complaints of mild SOB. Pt with no major mobility deficitis warranting PT at this time. Recommend dc ANTHONY or home with assist.        Rolando Cabrera, PT  2022            By cosigning this order, either electronically or physically, I certify that the therapy services are furnished while this patient is under my care, the services outlin ine above are required by this patient, and will be reviewed every 90 days.        M.D.:__________________________________________ Date: ______________    22 0853    22 0816  XR Chest 1 View  1 Time Imaging         22 0816    22 0800  venlafaxine XR (EFFEXOR-XR) 24 hr capsule 150 mg  Daily With Breakfast         22 1639    22 0740  Obtain Informed Consent  Once         22 0739    22 0720  POC Glucose Once  PROCEDURE ONCE         22 0717    22 0600  levothyroxine (SYNTHROID, LEVOTHROID) tablet 75 mcg  Every Early Morning         22 1639    22 0600  pantoprazole (PROTONIX) EC tablet 40 mg  Every Early Morning         22 1639    22 0600  Comprehensive Metabolic Panel  Morning Draw         22 1629    22 0600  CBC (No Diff)  Morning Draw     "     09/05/22 1629 09/06/22 0600  Protime-INR  Morning Draw,   Status:  Canceled         09/05/22 1629    09/06/22 0600  Lactate Dehydrogenase  Morning Draw         09/05/22 1629 09/06/22 0007  PT Consult: Eval & Treat Functional Mobility Below Baseline  Once         09/06/22 0006    09/05/22 2100  riFAXIMin (XIFAXAN) tablet 550 mg  Every 12 Hours Scheduled         09/05/22 1639    09/05/22 2100  atorvastatin (LIPITOR) tablet 10 mg  Nightly         09/05/22 1639    09/05/22 2100  insulin detemir (LEVEMIR) injection 15 Units  Every 12 Hours Scheduled         09/05/22 1639 09/05/22 2100  sodium chloride 0.9 % flush 10 mL  Every 12 Hours Scheduled         09/05/22 1639 09/05/22 2100  sennosides-docusate (PERICOLACE) 8.6-50 MG per tablet 2 tablet  2 Times Daily        \"And\" Linked Group Details    09/05/22 1639 09/05/22 2100  !HOLD ANTICOAGULATION for 24 hours before thoracentesis  Every 12 Hours Scheduled         09/05/22 1627 09/05/22 2000  Vital Signs  Every 4 Hours       09/05/22 1639    09/05/22 1800  Oral Care  2 Times Daily       09/05/22 1639    09/05/22 1730  lactulose (CHRONULAC) 10 GM/15ML solution 10 g  Daily         09/05/22 1639    09/05/22 1730  metoprolol succinate XL (TOPROL-XL) 24 hr tablet 50 mg  Every 24 Hours Scheduled         09/05/22 1639    09/05/22 1730  Insulin Lispro (humaLOG) injection 0-7 Units  3 Times Daily Before Meals         09/05/22 1630    09/05/22 1702  POC Glucose Once  PROCEDURE ONCE         09/05/22 1659    09/05/22 1700  POC Glucose TID AC  3 Times Daily Before Meals       09/05/22 1630    09/05/22 1645  Protime-INR  Once         09/05/22 1644    09/05/22 1640  Intake & Output  Every Shift       09/05/22 1639    09/05/22 1640  Weigh Patient  Once         09/05/22 1639    09/05/22 1640  Insert Peripheral IV  Once         09/05/22 1639    09/05/22 1640  Saline Lock & Maintain IV Access  Continuous         09/05/22 1639    09/05/22 1640  Place Sequential " "Compression Device  Once         09/05/22 1639    09/05/22 1640  Maintain Sequential Compression Device  Continuous         09/05/22 1639    09/05/22 1640  Activity As Tolerated  Until Discontinued         09/05/22 1639    09/05/22 1640  Notify Provider (With Default Parameters)  Until Discontinued         09/05/22 1639    09/05/22 1640  Diet Regular; Consistent Carbohydrate  Diet Effective Now         09/05/22 1639    09/05/22 1639  sodium chloride 0.9 % flush 10 mL  As Needed         09/05/22 1639    09/05/22 1639  acetaminophen (TYLENOL) tablet 500 mg  Every 6 Hours PRN        \"Or\" Linked Group Details    09/05/22 1639    09/05/22 1639  acetaminophen (TYLENOL) 160 MG/5ML solution 500 mg  Every 6 Hours PRN        \"Or\" Linked Group Details    09/05/22 1639    09/05/22 1639  acetaminophen (TYLENOL) suppository 325 mg  Every 6 Hours PRN        \"Or\" Linked Group Details    09/05/22 1639    09/05/22 1639  ondansetron (ZOFRAN) tablet 4 mg  Every 6 Hours PRN        \"Or\" Linked Group Details    09/05/22 1639    09/05/22 1639  ondansetron (ZOFRAN) injection 4 mg  Every 6 Hours PRN        \"Or\" Linked Group Details    09/05/22 1639    09/05/22 1639  polyethylene glycol (MIRALAX) packet 17 g  Daily PRN        \"And\" Linked Group Details    09/05/22 1639    09/05/22 1639  bisacodyl (DULCOLAX) EC tablet 5 mg  Daily PRN        \"And\" Linked Group Details    09/05/22 1639    09/05/22 1639  bisacodyl (DULCOLAX) suppository 10 mg  Daily PRN        \"And\" Linked Group Details    09/05/22 1639    09/05/22 1632  Respiratory Culture - Sputum, Cough  Once         09/05/22 1631    09/05/22 1631  Hemoglobin A1c  Once         09/05/22 1630    09/05/22 1630  Do NOT Hold Basal or Correction Scale Insulin When Patient is NPO, Hold Scheduled Mealtime (Bolus) Insulin if NPO  Continuous         09/05/22 1630    09/05/22 1630  dextrose (GLUTOSE) oral gel 15 g  Every 15 Minutes PRN         09/05/22 1630    09/05/22 1630  dextrose (D50W) (25 g/50 mL) " IV injection 25 g  Every 15 Minutes PRN         09/05/22 1630    09/05/22 1630  glucagon (human recombinant) (GLUCAGEN DIAGNOSTIC) injection 1 mg  Every 15 Minutes PRN         09/05/22 1630    09/05/22 1628  Adult Transthoracic Echo Complete W/ Cont if Necessary Per Protocol  Once        Comments: Recurrent large right pleural effusion; eval valves/pericardium/EF    09/05/22 1628    09/05/22 1625  US Thoracentesis  1 Time Imaging         09/05/22 1627    09/05/22 1623  Initiate Observation Status  Once         09/05/22 1624    09/05/22 1623  Code Status and Medical Interventions:  Continuous         09/05/22 1624    09/05/22 1459  Obtain Medical Records (Specify)  Once        Comments: Call [facility] and get records from his prior admission and particularly thoracentesis results and pulmonology consult records    09/05/22 1458    09/05/22 1458  ED Bed Request  Once         09/05/22 1457    09/05/22 1132  NPO Diet NPO Type: Strict NPO  Diet Effective Now,   Status:  Canceled         09/05/22 1132    09/05/22 1132  Undress & Gown  Once         09/05/22 1132    09/05/22 1132  Cardiac Monitoring  Continuous         09/05/22 1132    09/05/22 1132  Continuous Pulse Oximetry  Continuous         09/05/22 1132    09/05/22 1132  Vital Signs  Per Hospital Policy         09/05/22 1132    09/05/22 1132  ECG 12 Lead  Once         09/05/22 1132    09/05/22 1132  XR Chest 1 View  1 Time Imaging         09/05/22 1132    09/05/22 1132  Insert Peripheral IV  Once         09/05/22 1132    09/05/22 1132  [place] Draw  Once         09/05/22 1132    09/05/22 1132  CBC & Differential  Once         09/05/22 1132    09/05/22 1132  Comprehensive Metabolic Panel  Once         09/05/22 1132    09/05/22 1132  BNP  Once         09/05/22 1132    09/05/22 1132  Troponin  Once         09/05/22 1132    09/05/22 1132  Green Top (Gel)  PROCEDURE ONCE         09/05/22 1132    09/05/22 1132  Lavender Top  PROCEDURE ONCE         09/05/22 1132     09/05/22 1132  Gold Top - SST  PROCEDURE ONCE         09/05/22 1132    09/05/22 1132  [place] Top  PROCEDURE ONCE         09/05/22 1132    09/05/22 1132  Light Blue Top  PROCEDURE ONCE         09/05/22 1132    09/05/22 1132  CBC Auto Differential  PROCEDURE ONCE         09/05/22 1132    09/05/22 1131  Oxygen Therapy- Nasal Cannula; 2 LPM; Titrate for SPO2: equal to or greater than, 92%  Continuous PRN,   Status:  Canceled       09/05/22 1132    09/05/22 1131  sodium chloride 0.9 % flush 10 mL  As Needed         09/05/22 1132    Unscheduled  Follow Hypoglycemia Standing Orders (Blood Glucose <70)  As Needed      Comments: Follow Protocol As Outlined in Process Instructions (Open Order Report to View Full Instructions)    09/05/22 1630    Unscheduled  Oxygen Therapy- Nasal Cannula; Titrate for SPO2: 88% - 92%  Continuous PRN       09/05/22 1631    Signed and Held  Body Fluid Cell Count With Differential - Pleural Fluid, Pleural Cavity  STAT         Signed and Held    Signed and Held  pH, Body Fluid - Pleural Fluid, Pleural Cavity  STAT         Signed and Held    Signed and Held  Albumin, Fluid - Pleural Fluid, Pleural Cavity  STAT         Signed and Held    Signed and Held  Protein, Body Fluid - Pleural Fluid, Pleural Cavity  STAT         Signed and Held    Signed and Held  Lactate Dehydrogenase, Body Fluid - Pleural Fluid, Pleural Cavity  STAT         Signed and Held    Signed and Held  Glucose, Body Fluid - Pleural Fluid, Pleural Cavity  STAT         Signed and Held    Signed and Held  AFB Culture - Body Fluid, Pleural Cavity  STAT         Signed and Held    Signed and Held  NON-GYN CYTOLOGY, [facility] ([facility])  STAT         Signed and Held    Signed and Held  Body Fluid Culture - Body Fluid, Pleural Cavity  STAT         Signed and Held    Signed and Held  Anaerobic Culture - Pleural Fluid, Pleural Cavity  STAT         Signed and Held    Signed and Held  Fungus Culture - Body Fluid, Pleural Cavity  STAT          Signed and Held    Signed and Held  KOH Prep - Body Fluid, Pleural Cavity  STAT         Signed and Held    Signed and Held  Fungus Smear - Body Fluid, Pleural Cavity  STAT         Signed and Held    Signed and Held  Pleural fluid bilirubin - Miscellaneous Test  STAT         Signed and Held    --  rifAXIMin (XIFAXAN) 200 MG tablet  2 Times Daily         09/06/22 0959    --  lactulose (CHRONULAC) 10 GM/15ML solution  3 Times Daily         09/06/22 0959    --  Insulin Lispro (humaLOG) 100 UNIT/ML injection  Every Morning,   Status:  Discontinued         09/06/22 0959    --  Insulin Lispro (humaLOG) 100 UNIT/ML injection  Nightly,   Status:  Discontinued         09/06/22 0959    --  levothyroxine (SYNTHROID, LEVOTHROID) 75 MCG tablet  Daily         09/06/22 0959    --  metoprolol succinate XL (TOPROL-XL) 50 MG 24 hr tablet  Daily         09/06/22 0959    --  venlafaxine XR (EFFEXOR-XR) 75 MG 24 hr capsule  Daily         09/06/22 0959    --  metFORMIN (GLUCOPHAGE) 1000 MG tablet  Every Evening         09/06/22 0959    --  simvastatin (ZOCOR) 40 MG tablet  Nightly         09/06/22 0959    --  insulin lispro protamine-insulin lispro (humaLOG 75-25) (75-25) 100 UNIT/ML suspension injection  Every Morning         09/06/22 1007    --  insulin lispro protamine-insulin lispro (humaLOG 75-25) (75-25) 100 UNIT/ML suspension injection  Nightly         09/06/22 1007                Physician Progress Notes (last 24 hours)  Notes from 09/05/22 1103 through 09/06/22 1103   No notes of this type exist for this encounter.         Consult Notes (last 24 hours)  Notes from 09/05/22 1103 through 09/06/22 1103   No notes of this type exist for this encounter.

## 2022-09-06 NOTE — PLAN OF CARE
Goal Outcome Evaluation:  Plan of Care Reviewed With: patient        Progress: improving  Outcome Evaluation: Patient vitals stable. Patient educated on his medications. Patient also educated on using the call bell when he needed assistance. Patient denies any pain. Will continue to monitor.

## 2022-09-07 ENCOUNTER — APPOINTMENT (OUTPATIENT)
Dept: CT IMAGING | Facility: HOSPITAL | Age: 72
End: 2022-09-07

## 2022-09-07 LAB
ALPHA-FETOPROTEIN: <2 NG/ML (ref 0–8.3)
GIE STN SPEC: NORMAL
GLUCOSE BLDC GLUCOMTR-MCNC: 128 MG/DL (ref 70–130)
GLUCOSE BLDC GLUCOMTR-MCNC: 224 MG/DL (ref 70–130)
GLUCOSE BLDC GLUCOMTR-MCNC: 244 MG/DL (ref 70–130)
REF LAB TEST METHOD: NORMAL

## 2022-09-07 PROCEDURE — 0 IOPAMIDOL PER 1 ML: Performed by: INTERNAL MEDICINE

## 2022-09-07 PROCEDURE — 63710000001 INSULIN DETEMIR PER 5 UNITS: Performed by: INTERNAL MEDICINE

## 2022-09-07 PROCEDURE — 99222 1ST HOSP IP/OBS MODERATE 55: CPT | Performed by: PHYSICIAN ASSISTANT

## 2022-09-07 PROCEDURE — 82962 GLUCOSE BLOOD TEST: CPT

## 2022-09-07 PROCEDURE — 63710000001 INSULIN LISPRO (HUMAN) PER 5 UNITS: Performed by: INTERNAL MEDICINE

## 2022-09-07 PROCEDURE — 74178 CT ABD&PLV WO CNTR FLWD CNTR: CPT

## 2022-09-07 PROCEDURE — 99231 SBSQ HOSP IP/OBS SF/LOW 25: CPT | Performed by: NURSE PRACTITIONER

## 2022-09-07 RX ORDER — SPIRONOLACTONE 25 MG/1
100 TABLET ORAL DAILY
Status: DISCONTINUED | OUTPATIENT
Start: 2022-09-07 | End: 2022-09-15 | Stop reason: HOSPADM

## 2022-09-07 RX ORDER — LACTULOSE 10 G/15ML
20 SOLUTION ORAL 2 TIMES DAILY
Status: DISCONTINUED | OUTPATIENT
Start: 2022-09-07 | End: 2022-09-15 | Stop reason: HOSPADM

## 2022-09-07 RX ORDER — FUROSEMIDE 40 MG/1
40 TABLET ORAL DAILY
Status: DISCONTINUED | OUTPATIENT
Start: 2022-09-07 | End: 2022-09-15 | Stop reason: HOSPADM

## 2022-09-07 RX ADMIN — Medication 10 ML: at 08:55

## 2022-09-07 RX ADMIN — METOPROLOL SUCCINATE 50 MG: 50 TABLET, EXTENDED RELEASE ORAL at 08:54

## 2022-09-07 RX ADMIN — LACTULOSE 10 G: 20 SOLUTION ORAL at 08:55

## 2022-09-07 RX ADMIN — VENLAFAXINE HYDROCHLORIDE 150 MG: 75 CAPSULE, EXTENDED RELEASE ORAL at 08:54

## 2022-09-07 RX ADMIN — RIFAXIMIN 550 MG: 550 TABLET ORAL at 21:59

## 2022-09-07 RX ADMIN — Medication 10 ML: at 21:59

## 2022-09-07 RX ADMIN — INSULIN DETEMIR 15 UNITS: 100 INJECTION, SOLUTION SUBCUTANEOUS at 08:55

## 2022-09-07 RX ADMIN — SENNOSIDES AND DOCUSATE SODIUM 2 TABLET: 50; 8.6 TABLET ORAL at 08:54

## 2022-09-07 RX ADMIN — INSULIN LISPRO 3 UNITS: 100 INJECTION, SOLUTION INTRAVENOUS; SUBCUTANEOUS at 11:29

## 2022-09-07 RX ADMIN — ATORVASTATIN CALCIUM 10 MG: 10 TABLET, FILM COATED ORAL at 21:58

## 2022-09-07 RX ADMIN — PANTOPRAZOLE SODIUM 40 MG: 40 TABLET, DELAYED RELEASE ORAL at 05:13

## 2022-09-07 RX ADMIN — INSULIN DETEMIR 15 UNITS: 100 INJECTION, SOLUTION SUBCUTANEOUS at 21:59

## 2022-09-07 RX ADMIN — INSULIN LISPRO 3 UNITS: 100 INJECTION, SOLUTION INTRAVENOUS; SUBCUTANEOUS at 16:56

## 2022-09-07 RX ADMIN — IOPAMIDOL 95 ML: 755 INJECTION, SOLUTION INTRAVENOUS at 10:39

## 2022-09-07 RX ADMIN — SPIRONOLACTONE 100 MG: 25 TABLET ORAL at 11:29

## 2022-09-07 RX ADMIN — LACTULOSE 20 G: 10 SOLUTION ORAL at 21:59

## 2022-09-07 RX ADMIN — SENNOSIDES AND DOCUSATE SODIUM 2 TABLET: 50; 8.6 TABLET ORAL at 21:59

## 2022-09-07 RX ADMIN — RIFAXIMIN 550 MG: 550 TABLET ORAL at 08:54

## 2022-09-07 RX ADMIN — FUROSEMIDE 40 MG: 40 TABLET ORAL at 11:29

## 2022-09-07 RX ADMIN — LEVOTHYROXINE SODIUM 75 MCG: 0.07 TABLET ORAL at 05:13

## 2022-09-07 NOTE — PROGRESS NOTES
[facility] Medicine Services  PROGRESS NOTE    Patient Name: Gwyn Fuchs  : 1950  MRN: [id number]    Date of Admission: 2022  Primary Care Physician: Riley Mckoy DO    Subjective   Subjective     CC:  Recurrent pleural effusion    HPI:  Pt returning from CT scan. He is A&OX3 today. He reports a BM this morning. Instructed him BM goal is  2-3 per day. His daughter states he has been noncompliant with lactulose. Updated the family today.     ROS:  Gen- No fevers, chills  CV- No chest pain, palpitations  Resp- No cough, (+) dyspnea with exertion  GI- No N/V/D, abd pain    Objective   Objective     Vital Signs:   Temp:  [97.5 °F (36.4 °C)-98.2 °F (36.8 °C)] 97.6 °F (36.4 °C)  Heart Rate:  [] 89  Resp:  [16-18] 16  BP: (131-161)/(74-91) 132/74     Physical Exam:  Constitutional: Awake, alert, NAD, pleasant  HENT: NCAT, mucous membranes moist  Respiratory: Clear dim in the bases, nonlabored respirations   Cardiovascular: RRR, no murmurs, rubs, or gallops  Gastrointestinal: Positive bowel sounds, soft, nontender, nondistended  Musculoskeletal: No bilateral ankle edema  Psychiatric: Appropriate affect, cooperative  Neurologic: Oriented x 3, strength symmetric in all extremities, Cranial Nerves grossly intact to confrontation, speech clear  Skin: No rashes    Results Reviewed:  LAB RESULTS:      Lab 22  0258 22  1230   WBC 3.49 5.02   HEMOGLOBIN 10.2* 9.8*   HEMATOCRIT 32.6* 30.3*   PLATELETS 82* 83*   NEUTROS ABS  --  3.15   IMMATURE GRANS (ABS)  --  0.03   LYMPHS ABS  --  0.77   MONOS ABS  --  0.76   EOS ABS  --  0.29   MCV 90.6 86.8   *  --    PROTIME  --  13.6         Lab 22  0258 22  1230   SODIUM 134* 136   POTASSIUM 3.7 3.9   CHLORIDE 100 101   CO2 23.0 22.0   ANION GAP 11.0 13.0   BUN 13 14   CREATININE 1.26 1.46*   EGFR 60.6 50.8*   GLUCOSE 104* 227*   CALCIUM 8.7 9.0   HEMOGLOBIN A1C  --  9.30*         Lab 22  0258  09/05/22  1230   TOTAL PROTEIN 7.4 7.7   ALBUMIN 2.90* 3.20*   GLOBULIN 4.5 4.5   ALT (SGPT) 67* 58*   AST (SGOT) 108* 109*   BILIRUBIN 0.3 0.2   ALK PHOS 151* 146*         Lab 09/05/22  1230   PROBNP 332.2   TROPONIN T 0.012   PROTIME 13.6   INR 1.05                 Brief Urine Lab Results     None          Microbiology Results Abnormal     Procedure Component Value - Date/Time    AFB Culture - Body Fluid, Pleural Cavity [[id number]] Collected: 09/06/22 1123    Lab Status: Preliminary result Specimen: Body Fluid from Pleural Cavity Updated: 09/07/22 1159     AFB Stain No acid fast bacilli seen on concentrated smear    Body Fluid Culture - Body Fluid, Pleural Cavity [[id number]] Collected: 09/06/22 1123    Lab Status: Preliminary result Specimen: Body Fluid from Pleural Cavity Updated: 09/07/22 0821     Body Fluid Culture No growth     Gram Stain Many (4+) WBCs seen      No organisms seen    KOH Prep - Body Fluid, Pleural Cavity [[id number]] Collected: 09/06/22 1123    Lab Status: Final result Specimen: Body Fluid from Pleural Cavity Updated: 09/06/22 1250     KOH Prep No yeast or hyphal elements seen          Adult Transthoracic Echo Complete W/ Cont if Necessary Per Protocol    Result Date: 9/6/2022  · Left ventricular wall thickness is consistent with mild concentric hypertrophy. · Mildly reduced right ventricular systolic function noted. · There is calcification of the aortic valve. · Severe mitral valve regurgitation is present. · Estimated right ventricular systolic pressure from tricuspid regurgitation is mildly elevated (35-45 mmHg). Calculated right ventricular systolic pressure from tricuspid regurgitation is 37 mmHg. · There is a right pleural effusion. · Global LV hypokinesis · EF=30-35%      XR Chest 1 View    Result Date: 9/6/2022  DATE OF EXAM: 9/6/2022 11:11 AM  PROCEDURE: XR CHEST 1 VW-  INDICATIONS: thora; P08-Lryvboz effusion, not elsewhere classified; R06.02-Shortness of breath  COMPARISON: 9/6/2022  8:20 AM  TECHNIQUE: Single radiographic AP view of the chest was obtained.  FINDINGS: Unchanged cardiac silhouette. Interval decrease in now small right pleural effusion. Right basilar opacity which may represent atelectasis. No evidence of pneumothorax. Left lung is clear. No acute osseous abnormality. Degenerative changes of the acromioclavicular joints.      Impression: Interval decrease in now small right pleural effusion with associated right basilar opacity which may represent atelectasis. No evidence of pneumothorax.  This report was finalized on 9/6/2022 11:41 AM by Austin Perez.      XR Chest 1 View    Result Date: 9/6/2022  DATE OF EXAM: 9/6/2022 8:28 AM  PROCEDURE: XR CHEST 1 VW-  INDICATIONS: evaluate effusion; V28-Ycmjztz effusion, not elsewhere classified; R06.02-Shortness of breath  COMPARISON: One day prior  TECHNIQUE: Single radiographic AP view of the chest was obtained.  FINDINGS: There is unchanged dense opacity at the right lung base, favoring component of unchanged moderate to large effusion and volume loss. Some component of elevation of the hemidiaphragm is possible, difficult to evaluate in the absence of more remote comparison exams. The left lung remains clear. There is no pneumothorax. Unchanged mild cardiac enlargement.      Impression: There is unchanged dense opacity at the right lung base, favoring component of unchanged moderate to large effusion and volume loss. Some component of elevation of the hemidiaphragm is possible, difficult to evaluate in the absence of more remote comparison exams. The left lung remains clear. There is no pneumothorax. Unchanged mild cardiac enlargement.  This report was finalized on 9/6/2022 9:01 AM by Chuy Hollingsworth.      US Thoracentesis    Result Date: 9/6/2022  US THORACENTESIS-                                                         History: US THORACENTESIS-                                                : Jerry Morin MD.   Assistant:  None.                                                                             Modality: Ultrasound                                                                                                          Sedation: None.  Anesthesia: Lidocaine, local infiltration.           Estimated blood loss:  < 5 cc.          Technique: Discussion of risks, benefits, and alternatives were made with the patient. The patient expressed understanding and agreed to proceed. Informed written consent was obtained. A universal timeout was performed prior to starting the procedure. Maximal sterile precautions were utilized. The procedure room personnel used personal protective equipment. The operators additionally used sterile surgical gloves.  A preliminary ultrasonography was performed. It showed a pleural effusion. A low intercostal access site was selected for access. Pertinent ultrasound images were stored to the PACS for documentation. The patient position was optimized for the performance of thoracentesis. The access site was sterilely prepped and draped. Local anesthesia was administered. A catheter over the needle system was advanced into the pleura. Straw colored fluid was aspirated and the plastic catheter advanced into the pleural space. The catheter was then connected to a fluid recovery system.  Endpoint of thoracentesis: Incessant coughing  At the end of the procedure, the catheter was withdrawn and an aseptic dressing applied. The patient tolerated the procedure well.  Postprocedure chest x-ray is pending.  After uneventful recovery recovery, the patient was discharged from the department in stable condition.           Complications: None immediate.  Specimen: The specimen was labeled and sent to the lab in appropriate carriers & containers if such was requested by the ordering provider..                                                                  Impression: Impression:   Successful ultrasound-guided  thoracentesis of right pleural effusion with recovery of 2 liters of pleural fluid.            We stopped early because of the reason mentioned above, we can safely perform another thoracentesis in the near future. The patient should be evaluated for hepatic hydrothorax.  Thank you for the opportunity to assist in the care of your patient.  This report was finalized on 9/6/2022 11:12 AM by Jerry Morin MD.        Results for orders placed during the hospital encounter of 09/05/22    Adult Transthoracic Echo Complete W/ Cont if Necessary Per Protocol    Interpretation Summary  · Left ventricular wall thickness is consistent with mild concentric hypertrophy.  · Mildly reduced right ventricular systolic function noted.  · There is calcification of the aortic valve.  · Severe mitral valve regurgitation is present.  · Estimated right ventricular systolic pressure from tricuspid regurgitation is mildly elevated (35-45 mmHg). Calculated right ventricular systolic pressure from tricuspid regurgitation is 37 mmHg.  · There is a right pleural effusion.  · Global LV hypokinesis  · EF=30-35%      I have reviewed the medications:  Scheduled Meds:atorvastatin, 10 mg, Oral, Nightly  furosemide, 40 mg, Oral, Daily  insulin detemir, 15 Units, Subcutaneous, Q12H  insulin lispro, 0-7 Units, Subcutaneous, TID AC  lactulose, 20 g, Oral, BID  levothyroxine, 75 mcg, Oral, Q AM  metoprolol succinate XL, 50 mg, Oral, Q24H  pantoprazole, 40 mg, Oral, Q AM  rifAXIMin, 550 mg, Oral, Q12H  senna-docusate sodium, 2 tablet, Oral, BID  sodium chloride, 10 mL, Intravenous, Q12H  spironolactone, 100 mg, Oral, Daily  venlafaxine XR, 150 mg, Oral, Daily With Breakfast      Continuous Infusions:   PRN Meds:.•  acetaminophen **OR** acetaminophen **OR** acetaminophen  •  senna-docusate sodium **AND** polyethylene glycol **AND** bisacodyl **AND** bisacodyl  •  dextrose  •  dextrose  •  glucagon (human recombinant)  •  ondansetron **OR** ondansetron  •   sodium chloride  •  sodium chloride    Assessment & Plan   Assessment & Plan     Active Hospital Problems    Diagnosis  POA   • **Recurrent right pleural effusion [J90]  Unknown   • Other cirrhosis of liver (HCC) [K74.69]  Yes   • Coronary artery disease involving native coronary artery of native heart without angina pectoris [I25.10]  Yes   • HTN (hypertension) [I10]  Yes   • Type 2 diabetes mellitus without complication, with long-term current use of insulin (HCC) [E11.9, Z79.4]  Not Applicable   • Impaired renal function [N28.9]  Yes      Resolved Hospital Problems   No resolved problems to display.     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  - S/p large-volume thoracentesis at OSH 8/18, 8/24; via [facility] on an outpatient basis; unclear labs/Dx  -Chest x-ray on admission shows large right-sided pleural effusion with vascular crowding and dependent atelectasis.  -IR thoracentesis performed with labs, cytology  -ECHO EF 30 to 35% with severe MR, LV dysfunction  - obtain records from [place]. Sounds like they thought it was hepatic hydrothorax and were considering placement of a pleurex.  -GI consulted recommending 2 g sodium diet, Lasix 40 mg and spironolactone 100 mg daily.  Did not recommend chest tube for the treatment of hepatic hydrothorax as this can lead to massive protein electrolyte depletion, infection, renal failure and bleeding.  Patient also a poor candidate for TIPS because of CHF and moderate pulmonary hypertension and hepatic encephalopathy.  Lactulose was increased  to 20 mg twice daily for a bowel movement goal of 2 to 3/day  - Updated the patient's daughter today.      LEZAMA cirrhosis w/ anemia/thrombocytopenia  -Home Xifaxan, lactulose, PPI  -AST/ALT elevated and stable.  Sodium 134.  INR within normal limits.  -Noted to be on home lactulose, increased today to 20 mg bid for a BM goal of 2-3 per day  -GI consulted recommending 2 g sodium diet, Lasix 40 mg and spironolactone 100 mg daily.  Did not recommend chest tube for the treatment of hepatic hydrothorax as this can lead to massive protein electrolyte depletion, infection, renal failure and bleeding.  Patient also a poor candidate for TIPS because of CHF and moderate pulmonary hypertension and hepatic encephalopathy.  Lactulose was increased to 20 mg twice daily for a bowel movement goal of 2 to 3/day    CAD s/p remote stenting  Hx MI  Hypertension  Hyperlipidemia  - Home Toprol-XL, statin  - Last known [facility] 2015, no intervention, had previously had stenting  -Previously followed by Dr. Osorio, last appointment 8/4/2015  -Data deficit for lack of statin use     DM type 2, unknown A1c, with long-term use of insulin  -Check A1c  - Baseline takes oral metformin and basal insulin 30U qAM / 25U qPM  - Levemir ordered w/ Accu-Cheks and SSI     Impaired renal function, data deficit   -Cr 1.46 was noted on presentation, improved.     DVT prophylaxis: SCDs    DVT prophylaxis:  Mechanical DVT prophylaxis orders are present.     AM-PAC 6 Clicks Score (PT): 24 (09/07/22 0800)    CODE STATUS:   Code Status and Medical Interventions:   Ordered at: 09/05/22 1624     Code Status (Patient has no pulse and is not breathing):    CPR (Attempt to Resuscitate)     Medical Interventions (Patient has pulse or is breathing):    Full Support     This patient's problems and plans were partially entered by my partner and updated as appropriate by me 09/07/22. Today is my first day evaluating this patient's active medical problems. I Personally  reviewed chart and adjusted note to reflect daily changes in management/clinical condition      Trinh Deleon, STEPHANIE  09/07/22

## 2022-09-07 NOTE — CONSULTS
"          Cardiology Consult - [facility]    Gwyn Fuchs     [id number]  1950  [address] [facility] [address]         Admission Date:  9/5/2022    Consultation Date:  09/07/22        PCP:  Riley Mckoy DO  Referring MD:  Dr. Dyer  Consulting MD:  Dr. Yovani Valenzuela  Primary Cardiologist:  Dr. Osorio          CC:  Recurrent Right Pleural Effusion    Reason for Consult: New onset \"CHF\"        Recurrent right pleural effusion    Other cirrhosis of liver (HCC)    Coronary artery disease involving native coronary artery of native heart without angina pectoris    HTN (hypertension)    Type 2 diabetes mellitus without complication, with long-term current use of insulin (HCC)    Impaired renal function      Allergies:  is allergic to penicillins.    Medications Prior to Admission   Medication Sig Dispense Refill Last Dose   • insulin lispro protamine-insulin lispro (humaLOG 75-25) (75-25) 100 UNIT/ML suspension injection Inject 30 Units under the skin into the appropriate area as directed Every Morning.      • insulin lispro protamine-insulin lispro (humaLOG 75-25) (75-25) 100 UNIT/ML suspension injection Inject 25 Units under the skin into the appropriate area as directed Every Night.      • lactulose (CHRONULAC) 10 GM/15ML solution Take 20 g by mouth 3 (Three) Times a Day.      • levothyroxine (SYNTHROID, LEVOTHROID) 75 MCG tablet Take 75 mcg by mouth Daily.      • metFORMIN (GLUCOPHAGE) 1000 MG tablet Take 2,000 mg by mouth Every Evening.      • metoprolol succinate XL (TOPROL-XL) 50 MG 24 hr tablet Take 50 mg by mouth Daily.      • rifAXIMin (XIFAXAN) 200 MG tablet Take 200 mg by mouth 2 (Two) Times a Day.      • simvastatin (ZOCOR) 40 MG tablet Take 40 mg by mouth Every Night.      • venlafaxine XR (EFFEXOR-XR) 75 MG 24 hr capsule Take 75 mg by mouth Daily.                 HPI:  Gwyn Fuchs is a 73 yo CM with PMHx HTN, HLP, CAD (stent LCx 2008), Hypothyroidism, IDDMII, LEZAMA cirrhosis. He " "was diagnosed with a large pleural effusion by his PCP and underwent thoracentesis on 8/18 and 8/24/22 at [facility], 2L removed each time.  Family uncertain of any cytology results or diagnoses.  They are unhappy with care receiving at [facility] and present to [facility] ED with recurrent Right pleural effusion - associated orthopnea, DHILLON, intermittent cough.  He was admitted to [facility] hospitalist service and has been followed by their service.  Also consulted by GI for LEZAMA. He underwent thoracentesis in IR on Monday.    Cardiology has been asked to see him in consultation for \"new onset CHF.\"  He has previously followed with Dr. Osorio and has not been seen since August 2020.  He has been living in an assisted living home for memory/cognitive issues.  His proBNP is only 332.  CXR shows mild cardiac enlargement, other than pleural effusion, normal. Echo shows severe MR, RVSP 37mmHg, global HK, EF 30-35%.  At time of consultation, patient is resting in bed.  No family present.  He is alert to name and place only.  He reports feeling okay today - very appreciative to talk to me.  He reports his breathing is much better than it was at admit.  He denies chest pain, denies pedal edema, denies orthopnea/PND.  He does not use O2 at home.          Social History     Socioeconomic History   • Marital status:    Tobacco Use   • Smoking status: Former Smoker     Types: Cigars   • Smokeless tobacco: Never Used   Vaping Use   • Vaping Use: Never used   Substance and Sexual Activity   • Alcohol use: Not Currently     Comment: 2-3 drinks of vodka weekly   • Drug use: No     Family History   Problem Relation Age of Onset   • Heart failure Father    • Cancer Brother         Leukemia     Past Surgical History:   Procedure Laterality Date   • KNEE SURGERY Right      ROS: Pertinent items are noted in HPI, all other systems reviewed and negative     Objective     height is 180.3 cm (71\") and weight is 75.8 kg (167 lb). His oral temperature " is 97.6 °F (36.4 °C). His blood pressure is 132/74 and his pulse is 89. His respiration is 16 and oxygen saturation is 94%.      Intake/Output Summary (Last 24 hours) at 9/7/2022 1255  Last data filed at 9/7/2022 1200  Gross per 24 hour   Intake 600 ml   Output --   Net 600 ml     Intake/Output                       09/05/22 0701 - 09/06/22 0700 09/07/22 0701 - 09/08/22 0700     [id number] Total [id number] 4847-2260 Total                 Intake    P.O.  360  -- 360  600  -- 600    Total Intake 360 -- 360 600 -- 600       Output    Total Output -- -- -- -- -- --             09/05/22  1128 09/05/22  1640 09/06/22  1010   Weight: 77.1 kg (170 lb) 75.8 kg (167 lb 2.1 oz) 75.8 kg (167 lb)          Physical Exam:  General Appearance:    Alert, cooperative, in no acute distress   Head:    Normocephalic, without obvious abnormality, atraumatic   Eyes:            Lids and lashes normal, conjunctivae and sclerae normal, no   icterus, no pallor, corneas clear, PERRLA   Ears:    Ears appear intact with no abnormalities noted   Throat:   No oral lesions, no thrush, oral mucosa moist   Neck:   No adenopathy, supple, trachea midline, no thyromegaly, no carotid bruit, no JVD   Back:     No kyphosis present, no scoliosis present, no skin lesions,    erythema or scars, no tenderness to percussion or                   palpation, range of motion normal   Lungs:     Clear to auscultation,respirations regular, even and               unlabored    Heart:    Regular rhythm and normal rate, normal S1 and S2, no         murmur, no gallop, no rub, no click   Abdomen:     Normal bowel sounds, no masses, no organomegaly, soft     nontender, nondistended, no guarding, no rebound   tenderness   Extremities:   Moves all extremities well,  no cyanosis, no redness, no edema   Pulses:   Pulses palpable and equal bilaterally   Skin:   No bleeding, bruising or rash   Lymph nodes:   No palpable adenopathy   Neurologic:   Cranial nerves 2 - 12  grossly intact, sensation intact, DTR     present and equal bilaterally          Results Review:  I personally reviewed the patient's clinical results.  Results from last 7 days   Lab Units 09/06/22  0258   WBC 10*3/mm3 3.49   HEMOGLOBIN g/dL 10.2*   HEMATOCRIT % 32.6*   PLATELETS 10*3/mm3 82*     Results from last 7 days   Lab Units 09/06/22  0258   SODIUM mmol/L 134*   POTASSIUM mmol/L 3.7   CHLORIDE mmol/L 100   CO2 mmol/L 23.0   BUN mg/dL 13   CREATININE mg/dL 1.26   CALCIUM mg/dL 8.7   BILIRUBIN mg/dL 0.3   ALK PHOS U/L 151*   ALT (SGPT) U/L 67*   AST (SGOT) U/L 108*   GLUCOSE mg/dL 104*       Results from last 7 days   Lab Units 09/05/22  1230   INR  1.05             Results from last 7 days   Lab Units 09/05/22  1230   PROBNP pg/mL 332.2             Radiology:  Imaging Results (Last 72 Hours)     Procedure Component Value Units Date/Time    CT Abdomen Pelvis With & Without Contrast [[id number]] Resulted: 09/07/22 1012     Updated: 09/07/22 1041    XR Chest 1 View [[id number]] Collected: 09/06/22 1139     Updated: 09/06/22 1144    Narrative:      DATE OF EXAM: 9/6/2022 11:11 AM     PROCEDURE: XR CHEST 1 VW-     INDICATIONS: thora; T88-Ilrsshc effusion, not elsewhere classified;  R06.02-Shortness of breath     COMPARISON: 9/6/2022 8:20 AM     TECHNIQUE: Single radiographic AP view of the chest was obtained.     FINDINGS:  Unchanged cardiac silhouette. Interval decrease in now small right  pleural effusion. Right basilar opacity which may represent atelectasis.  No evidence of pneumothorax. Left lung is clear. No acute osseous  abnormality. Degenerative changes of the acromioclavicular joints.       Impression:      Interval decrease in now small right pleural effusion with associated  right basilar opacity which may represent atelectasis. No evidence of  pneumothorax.     This report was finalized on 9/6/2022 11:41 AM by Austin Perez.       US Thoracentesis [[id number]] Collected: 09/06/22 1046    Specimen:  Body Fluid Updated: 09/06/22 1125    Narrative:      US THORACENTESIS-                                                            History: US THORACENTESIS-                                                    : Jerry Morin MD.     Assistant:  None.                                                                                   Modality: Ultrasound                                                                                                                Sedation: None.      Anesthesia: Lidocaine, local infiltration.               Estimated blood loss:  < 5 cc.              Technique:   Discussion of risks, benefits, and alternatives were made with the  patient. The patient expressed understanding and agreed to proceed.  Informed written consent was obtained. A universal timeout was performed  prior to starting the procedure. Maximal sterile precautions were  utilized. The procedure room personnel used personal protective  equipment. The operators additionally used sterile surgical gloves.     A preliminary ultrasonography was performed. It showed a pleural  effusion. A low intercostal access site was selected for access.  Pertinent ultrasound images were stored to the PACS for documentation.  The patient position was optimized for the performance of thoracentesis.  The access site was sterilely prepped and draped. Local anesthesia was  administered. A catheter over the needle system was advanced into the  pleura. Straw colored fluid was aspirated and the plastic catheter  advanced into the pleural space. The catheter was then connected to a  fluid recovery system.      Endpoint of thoracentesis: Incessant coughing     At the end of the procedure, the catheter was withdrawn and an aseptic  dressing applied. The patient tolerated the procedure well.      Postprocedure chest x-ray is pending.     After uneventful recovery recovery, the patient was discharged from the  department in stable  condition.               Complications: None immediate.     Specimen: The specimen was labeled and sent to the lab in appropriate  carriers & containers if such was requested by the ordering provider..                                                                     Impression:      Impression:     Successful ultrasound-guided thoracentesis of right pleural effusion  with recovery of 2 liters of pleural fluid.                We stopped early because of the reason mentioned above, we can safely  perform another thoracentesis in the near future. The patient should be  evaluated for hepatic hydrothorax.     Thank you for the opportunity to assist in the care of your patient.     This report was finalized on 9/6/2022 11:12 AM by Jerry Morin MD.       XR Chest 1 View [[id number]] Collected: 09/06/22 0859     Updated: 09/06/22 0904    Narrative:      DATE OF EXAM: 9/6/2022 8:28 AM     PROCEDURE: XR CHEST 1 VW-     INDICATIONS: evaluate effusion; X92-Naazsbm effusion, not elsewhere  classified; R06.02-Shortness of breath     COMPARISON: One day prior     TECHNIQUE: Single radiographic AP view of the chest was obtained.     FINDINGS:  There is unchanged dense opacity at the right lung base, favoring  component of unchanged moderate to large effusion and volume loss. Some  component of elevation of the hemidiaphragm is possible, difficult to  evaluate in the absence of more remote comparison exams. The left lung  remains clear. There is no pneumothorax. Unchanged mild cardiac  enlargement.       Impression:      There is unchanged dense opacity at the right lung base, favoring  component of unchanged moderate to large effusion and volume loss. Some  component of elevation of the hemidiaphragm is possible, difficult to  evaluate in the absence of more remote comparison exams. The left lung  remains clear. There is no pneumothorax. Unchanged mild cardiac  enlargement.     This report was finalized on 9/6/2022 9:01 AM by  "Chuy Hollingsworth.       XR Chest 1 View [[id number]] Collected: 09/05/22 1210     Updated: 09/05/22 1217    Narrative:         DATE OF EXAM:   9/5/2022 11:45 AM     PROCEDURE:   XR CHEST 1 VW-     INDICATIONS:   SOA triage protocol     COMPARISON:  No Comparisons Available     TECHNIQUE:   Portable Chest     FINDINGS:      Study is limited by overlying support and monitoring apparatus     There is a large right-sided pleural effusion suspected. Vascular  crowding and dependent atelectasis noted at the right base. Fluid is  noted within the fissure. Left lung appears grossly clear. Attenuation  heart size is limited by pleural effusion. Mediastinum appears grossly  unremarkable on the left. Osseous structures demonstrate no acute  abnormality       Impression:      IMPRESSION :   Large right-sided pleural effusion with associated vascular crowding and  dependent atelectasis on the right.     Left lung appears grossly clear[     This report was finalized on 9/5/2022 12:14 PM by Godwin Huston.               Tele:  NSR        ASSESSMENT:  -  New Onset \"CHF\" with new finding LV dysfunction, EF 30-35%  -  Severe MR by TTE  -  Recurrent Right Pleural Effusion  -  CAD  -  Essential Hypertension  -  LEZAMA Cirrhosis      PLAN:  -  71 yo CM with PMHx of CAD (stent 2008, patent stent 2015) with normal EF by cath at that time.  Presents with cough, orthopnea, SOA in setting of recurrent pleural effusion and LEZAMA cirrhosis.  ProBNP is WNL.  He is in NSR.  Echo shows severe MR, EF 30-35%.  LV Dysfunction by echo is new but overall, patient appears to be asymptomatic.  Respiratory complaints have improved with thoracentesis.  -  Patient is currently asymptomatic; would not pursue ischemic evaluation at this time with multiple co morbidities.  -  Continue home Toprol XL.  Lasix and Aldactone have been added by GI for effusions and hepatic hydrothorax.  Will repeat a.m. BMP and consider addition of ACE/ARB/ARNI at that time.    -  " Monitor I/Os, daily wts, nutrition.        I discussed the patient's findings and my recommendations with the patient, any present family members, and the nursing staff.  Yovani Valenzuela MD saw and examined patient, verified hx and PE, read all radiographic studies, reviewed labs and micro data, and formulated dx, plan for treatment and all medical decision making.      Milena Fleming PA-C, working with Yovani Valenzuela MD  09/07/22 12:55 EDT

## 2022-09-07 NOTE — PLAN OF CARE
Goal Outcome Evaluation:  Plan of Care Reviewed With: patient        Progress: improving  Outcome Evaluation: Patient is a pleasant 72 yr old male.  Vital signs are stable. Patient educated on medications. Patient also educated on handwashing. Patient's family was at bedside until visitation ended. Will continue to monitor.

## 2022-09-07 NOTE — CASE MANAGEMENT/SOCIAL WORK
Continued Stay Note   [place]     Patient Name: Gwyn Fuchs  MRN: [id number]  Today's Date: 9/7/2022    Admit Date: 9/5/2022     Discharge Plan     Row Name 09/07/22 0918       Plan    Plan Return to assisted living @ [facility].    Patient/Family in Agreement with Plan yes    Plan Comments Called & spoke with [place] [facility] and obtained physical address. They will need to do a re-assessment before he can return. CM to call the director, Kari Hernandez @ [phone], close to [place]. Post thoracentesis from 9/6. O2 RA. Plan is home and family will transport.    1215: GI following. No recs for TIPS procedure or CT placement. Cards consulted today.      Final Discharge Disposition Code 01 - home or self-care               Discharge Codes    No documentation.               Expected Discharge Date and Time     Expected Discharge Date Expected Discharge Time    Sep 9, 2022             Juana Marie RN

## 2022-09-07 NOTE — CONSULTS
[facility] Gastroenterology Consult    Referring Provider: Juana Shrestha MD   PCP: Riley Mckoy DO    Reason for Consultation: LEZAMA Cirrhosis with recurrent pleural effusion     Chief complaint: Shortness of breath     History of present illness:    Gwyn Fuchs is a 72 y.o. male who is admitted with shortness of breath and recurrent pleural effusions.    He has LEZAMA Cirrhosis decompensated with hepatic encephalopathy, diagnosed in 2019.   He is followed outpatient by gastroenterologist Dr. Rabago at [facility] but he has not been evaluated in one year.  Patient's cognition is slow and his memory is poor.   He is unable to tell me our current location nor the President's name.   History was obtained by speaking to his daughter, Lorena Nicole, on the phone.   He transitioned to assisted living in February 2022 due to cognitive decline, increased forgetfulness and inability to take his medications at home independently.       He presented to [facility] on August 19th for shortness of breath with findings of a pleural effusion.  He underwent a thoracentesis at that time.  He returned on 8/27 for a second thoracentesis.    His third thoracentesis was yesterday with Dr. Mckinnon at our facility with 2,000 mL removed.       He has never received diuretics for his pleural effusion.       Allergies:  Penicillins    Scheduled Meds:  hold, 1 each, Does not apply, Q12H  atorvastatin, 10 mg, Oral, Nightly  insulin detemir, 15 Units, Subcutaneous, Q12H  insulin lispro, 0-7 Units, Subcutaneous, TID AC  lactulose, 10 g, Oral, Daily  levothyroxine, 75 mcg, Oral, Q AM  metoprolol succinate XL, 50 mg, Oral, Q24H  pantoprazole, 40 mg, Oral, Q AM  rifAXIMin, 550 mg, Oral, Q12H  senna-docusate sodium, 2 tablet, Oral, BID  sodium chloride, 10 mL, Intravenous, Q12H  venlafaxine XR, 150 mg, Oral, Daily With Breakfast       Infusions:       PRN Meds:  acetaminophen **OR** acetaminophen **OR** acetaminophen  •  senna-docusate sodium  **AND** polyethylene glycol **AND** bisacodyl **AND** bisacodyl  •  dextrose  •  dextrose  •  glucagon (human recombinant)  •  ondansetron **OR** ondansetron  •  sodium chloride  •  sodium chloride    Home Meds:  Medications Prior to Admission   Medication Sig Dispense Refill Last Dose   • insulin lispro protamine-insulin lispro (humaLOG 75-25) (75-25) 100 UNIT/ML suspension injection Inject 30 Units under the skin into the appropriate area as directed Every Morning.      • insulin lispro protamine-insulin lispro (humaLOG 75-25) (75-25) 100 UNIT/ML suspension injection Inject 25 Units under the skin into the appropriate area as directed Every Night.      • lactulose (CHRONULAC) 10 GM/15ML solution Take 20 g by mouth 3 (Three) Times a Day.      • levothyroxine (SYNTHROID, LEVOTHROID) 75 MCG tablet Take 75 mcg by mouth Daily.      • metFORMIN (GLUCOPHAGE) 1000 MG tablet Take 2,000 mg by mouth Every Evening.      • metoprolol succinate XL (TOPROL-XL) 50 MG 24 hr tablet Take 50 mg by mouth Daily.      • rifAXIMin (XIFAXAN) 200 MG tablet Take 200 mg by mouth 2 (Two) Times a Day.      • simvastatin (ZOCOR) 40 MG tablet Take 40 mg by mouth Every Night.      • venlafaxine XR (EFFEXOR-XR) 75 MG 24 hr capsule Take 75 mg by mouth Daily.        ROS: Review of Systems   Constitutional: Positive for fatigue.   HENT: Negative.    Eyes: Negative.    Respiratory: Negative.    Cardiovascular: Negative.    Gastrointestinal: Negative.    Endocrine: Negative.    Genitourinary: Negative.    Musculoskeletal: Negative.    Skin: Negative.    Allergic/Immunologic: Negative.    Neurological: Positive for weakness.   Psychiatric/Behavioral: Positive for confusion and decreased concentration.       PAST MED HX:  Past Medical History:   Diagnosis Date   • Coronary artery disease    • Diabetes mellitus (HCC)    • Hyperlipidemia    • Hypertension    • Myocardial infarction (HCC)      PAST SURG HX:  Past Surgical History:   Procedure Laterality  "Date   • KNEE SURGERY Right      FAM HX:  Family History   Problem Relation Age of Onset   • Heart failure Father    • Cancer Brother         Leukemia     SOC HX:  Social History     Socioeconomic History   • Marital status:    Tobacco Use   • Smoking status: Former Smoker     Types: Cigars   • Smokeless tobacco: Never Used   Vaping Use   • Vaping Use: Never used   Substance and Sexual Activity   • Alcohol use: Not Currently     Comment: 2-3 drinks of vodka weekly   • Drug use: No     PHYSICAL EXAM  /78 (BP Location: Left arm, Patient Position: Lying)   Pulse 90   Temp 97.9 °F (36.6 °C) (Oral)   Resp 18   Ht 180.3 cm (71\")   Wt 75.8 kg (167 lb)   SpO2 94%   BMI 23.29 kg/m²   Wt Readings from Last 3 Encounters:   09/06/22 75.8 kg (167 lb)   12/07/17 77.1 kg (170 lb)   ,body mass index is 23.29 kg/m².  Physical Exam  Constitutional:       Comments: Elderly thin male lying side ways in the bed, awake and alert   HENT:      Head: Normocephalic and atraumatic.      Mouth/Throat:      Mouth: Mucous membranes are moist.   Eyes:      General: No scleral icterus.  Cardiovascular:      Rate and Rhythm: Normal rate and regular rhythm.   Pulmonary:      Effort: Pulmonary effort is normal. No respiratory distress.   Abdominal:      General: Bowel sounds are normal. There is no distension.      Palpations: Abdomen is soft.      Tenderness: There is no abdominal tenderness.   Musculoskeletal:      Right lower leg: No edema.      Left lower leg: No edema.   Skin:     General: Skin is warm and dry.   Neurological:      Mental Status: He is alert.      Comments: Cognition is slow.   Patient is not oriented to time nor place.  He is oriented to self.   Subtle asterixis   Failed STROOP test on exam       Results Review:   I reviewed the patient's new clinical results.    Lab Results   Component Value Date    WBC 3.49 09/06/2022    HGB 10.2 (L) 09/06/2022    HGB 9.8 (L) 09/05/2022    HGB 15.0 08/04/2015    HCT 32.6 " (L) 09/06/2022    MCV 90.6 09/06/2022    PLT 82 (L) 09/06/2022     Lab Results   Component Value Date    INR 1.05 09/05/2022     Lab Results   Component Value Date    GLUCOSE 104 (H) 09/06/2022    BUN 13 09/06/2022    CREATININE 1.26 09/06/2022    BCR 10.3 09/06/2022     (L) 09/06/2022    K 3.7 09/06/2022    CO2 23.0 09/06/2022    CALCIUM 8.7 09/06/2022    ALBUMIN 2.90 (L) 09/06/2022    ALKPHOS 151 (H) 09/06/2022    BILITOT 0.3 09/06/2022    ALT 67 (H) 09/06/2022     (H) 09/06/2022     TTE: (as interpreted by radiologist)  · Left ventricular wall thickness is consistent with mild concentric hypertrophy.  · Mildly reduced right ventricular systolic function noted.  · There is calcification of the aortic valve.  · Severe mitral valve regurgitation is present.  · Estimated right ventricular systolic pressure from tricuspid regurgitation is mildly elevated (35-45 mmHg). Calculated right ventricular systolic pressure from tricuspid regurgitation is 37 mmHg.  · There is a right pleural effusion.  · Global LV hypokinesis  · EF=30-35%    ASSESSMENTS/PLANS    1. LEZAMA cirrhosis with hepatic encephalopathy.  MELD-Na is 14.   Child Quigley Class B.    2. 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 Diastolic heart failure,  EF is 30-35%  4. Severe mitral valve regurgitation     Mr. Fuchs is a 72 year old male with LEZAMA cirrhosis and history of hepatic encephalopathy diagnosed initially in 2019 presenting with recurrent pleural effusion.   This is his third thoracentesis since 8/27/2022.   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      >>> Recommend management of hepatic hydrothorax with sodium restricted diet (<2,000 mg daily) and initiation of diuretic therapy.    Begin Lasix 40 mg and Spironolactone 100 mg daily.     >>> Obtain BMP tomorrow  >>> Obtain CT abdomen/pelvis liver mass protocol   >>> Chest tubes should not be placed for the treatment of hepatic hydrothorax.   Placement of chest tubes in hepatic  hydrothorax can result in massive protein and electrolyte depletion, infection, renal failure and bleeding.    >>> Patient is not a good candidate for TIPS with congestive heart failure, moderate pulmonary hypertension (RVSP is 35-45) and hepatic encephalopathy     >>> Patient has not had a bowel movement since arrival.  Increase Lactulose to 20 gm BID.  Goal of 2-3 bowel movements daily.        I discussed the patient's findings and my recommendations with patient and his daughter, Lorena Nicole.       CAROLYNN Casillas  09/07/22  08:34 EDT

## 2022-09-08 LAB
GLUCOSE BLDC GLUCOMTR-MCNC: 293 MG/DL (ref 70–130)
GLUCOSE BLDC GLUCOMTR-MCNC: 359 MG/DL (ref 70–130)
GLUCOSE BLDC GLUCOMTR-MCNC: 363 MG/DL (ref 70–130)

## 2022-09-08 PROCEDURE — 63710000001 INSULIN DETEMIR PER 5 UNITS: Performed by: NURSE PRACTITIONER

## 2022-09-08 PROCEDURE — 63710000001 INSULIN DETEMIR PER 5 UNITS: Performed by: INTERNAL MEDICINE

## 2022-09-08 PROCEDURE — 63710000001 INSULIN LISPRO (HUMAN) PER 5 UNITS: Performed by: INTERNAL MEDICINE

## 2022-09-08 PROCEDURE — 82962 GLUCOSE BLOOD TEST: CPT

## 2022-09-08 PROCEDURE — 99232 SBSQ HOSP IP/OBS MODERATE 35: CPT | Performed by: NURSE PRACTITIONER

## 2022-09-08 RX ORDER — METOPROLOL SUCCINATE 50 MG/1
100 TABLET, EXTENDED RELEASE ORAL
Status: DISCONTINUED | OUTPATIENT
Start: 2022-09-08 | End: 2022-09-15 | Stop reason: HOSPADM

## 2022-09-08 RX ADMIN — SACUBITRIL AND VALSARTAN 1 TABLET: 24; 26 TABLET, FILM COATED ORAL at 21:23

## 2022-09-08 RX ADMIN — INSULIN DETEMIR 15 UNITS: 100 INJECTION, SOLUTION SUBCUTANEOUS at 10:22

## 2022-09-08 RX ADMIN — ATORVASTATIN CALCIUM 10 MG: 10 TABLET, FILM COATED ORAL at 21:24

## 2022-09-08 RX ADMIN — PANTOPRAZOLE SODIUM 40 MG: 40 TABLET, DELAYED RELEASE ORAL at 06:35

## 2022-09-08 RX ADMIN — FUROSEMIDE 40 MG: 40 TABLET ORAL at 10:22

## 2022-09-08 RX ADMIN — RIFAXIMIN 550 MG: 550 TABLET ORAL at 10:13

## 2022-09-08 RX ADMIN — SPIRONOLACTONE 100 MG: 25 TABLET ORAL at 10:13

## 2022-09-08 RX ADMIN — LACTULOSE 20 G: 10 SOLUTION ORAL at 21:23

## 2022-09-08 RX ADMIN — VENLAFAXINE HYDROCHLORIDE 150 MG: 75 CAPSULE, EXTENDED RELEASE ORAL at 08:38

## 2022-09-08 RX ADMIN — Medication 10 ML: at 21:24

## 2022-09-08 RX ADMIN — SACUBITRIL AND VALSARTAN 1 TABLET: 24; 26 TABLET, FILM COATED ORAL at 10:13

## 2022-09-08 RX ADMIN — LEVOTHYROXINE SODIUM 75 MCG: 0.07 TABLET ORAL at 06:35

## 2022-09-08 RX ADMIN — SENNOSIDES AND DOCUSATE SODIUM 2 TABLET: 50; 8.6 TABLET ORAL at 10:13

## 2022-09-08 RX ADMIN — INSULIN DETEMIR 20 UNITS: 100 INJECTION, SOLUTION SUBCUTANEOUS at 21:24

## 2022-09-08 RX ADMIN — RIFAXIMIN 550 MG: 550 TABLET ORAL at 21:24

## 2022-09-08 RX ADMIN — INSULIN LISPRO 6 UNITS: 100 INJECTION, SOLUTION INTRAVENOUS; SUBCUTANEOUS at 11:55

## 2022-09-08 RX ADMIN — INSULIN LISPRO 6 UNITS: 100 INJECTION, SOLUTION INTRAVENOUS; SUBCUTANEOUS at 16:47

## 2022-09-08 RX ADMIN — LACTULOSE 20 G: 10 SOLUTION ORAL at 10:21

## 2022-09-08 RX ADMIN — METOPROLOL SUCCINATE 100 MG: 50 TABLET, EXTENDED RELEASE ORAL at 11:55

## 2022-09-08 RX ADMIN — INSULIN LISPRO 4 UNITS: 100 INJECTION, SOLUTION INTRAVENOUS; SUBCUTANEOUS at 08:37

## 2022-09-08 NOTE — PROGRESS NOTES
[facility]  PROGRESS NOTE    Patient Name: Gwyn Fuchs  : 1950  MRN: [id number]    Date of Admission: 2022  Primary Care Physician: Riley Mckoy DO    Subjective   Subjective     CC:  Recurrent pleural effusion    HPI:  Patient resting in bed, alert and oriented x3, pleasant, talkative.  Patient states shortness of breath is much improved since thoracentesis.  Cardiology planning a DALE.  Cardiology also increase Toprol-XL to 100 mg daily and added Entresto.  Lasix and Aldactone added by GI.  Glucose elevated over the last 24 hours.  Will increase Levemir today.    ROS:  Gen- No fevers, chills  CV- No chest pain, palpitations  Resp- (+) occasional dry cough, (+) dyspnea  GI- No N/V/D, abd pain      Objective   Objective     Vital Signs:   Temp:  [97.4 °F (36.3 °C)-98.7 °F (37.1 °C)] 97.6 °F (36.4 °C)  Heart Rate:  [] 97  Resp:  [18-19] 19  BP: ()/(55-97) 162/90     Physical Exam:  Constitutional: Awake, alert, NAD, Pleasant  HENT: NCAT, mucous membranes moist  Respiratory: Clear to auscultation bilaterally, nonlabored respirations 95% RA  Cardiovascular: Tachycardic, no murmurs, rubs, or gallop HR 94  Gastrointestinal: Positive bowel sounds, soft, nontender, nondistended  Musculoskeletal: No bilateral ankle edema  Psychiatric: Appropriate affect, cooperative  Neurologic: Oriented x 3, strength symmetric in all extremities, Cranial Nerves grossly intact to confrontation, speech clear  Skin: No rashes      Results Reviewed:  LAB RESULTS:      Lab 22  0258 22  1230   WBC 3.49 5.02   HEMOGLOBIN 10.2* 9.8*   HEMATOCRIT 32.6* 30.3*   PLATELETS 82* 83*   NEUTROS ABS  --  3.15   IMMATURE GRANS (ABS)  --  0.03   LYMPHS ABS  --  0.77   MONOS ABS  --  0.76   EOS ABS  --  0.29   MCV 90.6 86.8   *  --    PROTIME  --  13.6         Lab 22  0258 22  1230   SODIUM 134* 136   POTASSIUM 3.7 3.9   CHLORIDE 100 101   CO2 23.0 22.0    ANION GAP 11.0 13.0   BUN 13 14   CREATININE 1.26 1.46*   EGFR 60.6 50.8*   GLUCOSE 104* 227*   CALCIUM 8.7 9.0   HEMOGLOBIN A1C  --  9.30*         Lab 09/06/22  0258 09/05/22  1230   TOTAL PROTEIN 7.4 7.7   ALBUMIN 2.90* 3.20*   GLOBULIN 4.5 4.5   ALT (SGPT) 67* 58*   AST (SGOT) 108* 109*   BILIRUBIN 0.3 0.2   ALK PHOS 151* 146*         Lab 09/05/22  1230   PROBNP 332.2   TROPONIN T 0.012   PROTIME 13.6   INR 1.05                 Brief Urine Lab Results     None          Microbiology Results Abnormal     Procedure Component Value - Date/Time    Body Fluid Culture - Body Fluid, Pleural Cavity [[id number]] Collected: 09/06/22 1123    Lab Status: Preliminary result Specimen: Body Fluid from Pleural Cavity Updated: 09/08/22 0924     Body Fluid Culture No growth at 2 days     Gram Stain Many (4+) WBCs seen      No organisms seen    Fungus Smear - Body Fluid, Pleural Cavity [[id number]] Collected: 09/06/22 1123    Lab Status: Final result Specimen: Body Fluid from Pleural Cavity Updated: 09/07/22 1518     Fungal Stain No yeast or hyphal elements seen    AFB Culture - Body Fluid, Pleural Cavity [[id number]] Collected: 09/06/22 1123    Lab Status: Preliminary result Specimen: Body Fluid from Pleural Cavity Updated: 09/07/22 1159     AFB Stain No acid fast bacilli seen on concentrated smear    KOH Prep - Body Fluid, Pleural Cavity [[id number]] Collected: 09/06/22 1123    Lab Status: Final result Specimen: Body Fluid from Pleural Cavity Updated: 09/06/22 1250     KOH Prep No yeast or hyphal elements seen          CT Abdomen Pelvis With & Without Contrast    Result Date: 9/8/2022  DATE OF EXAM: 9/7/2022 10:11 AM  PROCEDURE: CT ABDOMEN PELVIS W WO CONTRAST-  INDICATIONS: Ascites; S47-[id number] effusion, not elsewhere classified; R06.02-Shortness of breath  COMPARISON: No comparisons available.  TECHNIQUE: Routine transaxial slices were obtained through the abdomen and pelvis without the administration of intravenous contrast.  Additional scanning through the abdomen and pelvis followed by the intravenous administration of 95 mL of Isovue 300. Reconstructed coronal and sagittal images were also obtained. Automated exposure control and iterative construction methods were used.  The radiation dose reduction device was turned on for each scan per the ALARA (As Low as Reasonably Achievable) protocol.  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  Images of the lung bases show a large free-flowing right pleural effusion and no left effusion. Unenhanced images of the liver show diffuse nodularity consistent with cirrhosis. No abnormal arterial phase enhancement is seen. Portal venous phase and delayed venous phase images likewise show uniform, normal liver parenchymal enhancement with no evidence of mass. There is no evidence of biliary ductal dilatation or intrahepatic mass effect to suggest underlying, occult mass. The portal veins, splenic vein, and superior mesenteric veins all enhance normally with contrast. Hepatic veins enhance normally as well. Intrahepatic IVC remains small in caliber as seen on axial image 28 series 6.  Elsewhere, the spleen is in the upper range of normal size at 13 cm in length but not considered pathologically enlarged. Pancreas, gallbladder, adrenal glands, and kidneys appear within normal limits. No upper abdominal free air ascites or adenopathy is appreciated. Large and small bowel loops are normal in caliber and normal in appearance except for questionable ascending and transverse colon mucosal thickening. In part this may be due to nondistention, but some actual mucosal thickening is favored, at least to the ascending colon. Ileocecal valve and terminal ileum appear grossly normal. Appendix is not identified. There is mild pelvic ascites. No mass or adenopathy is seen. Bladder is normally distended and normal in appearance.  Delayed venous phase images show no evidence of  obstructive uropathy. Bony structures appear to be intact.      Impression:  1. Large, free-flowing right pleural effusion, potentially hepatic hydrothorax. No left effusion. 2. Liver morphology consistent with cirrhosis. Otherwise uniform appearance of liver with no evidence of liver mass or ductal dilatation. 3. Mild ascites. 4. Mildly thick-walled appearance of the ascending and transverse colon, perhaps in part due to nondistention. Mild active colonic inflammation is favored. Please correlate with any GI symptoms.  This report was finalized on 9/8/2022 10:09 AM by Dr. Anthony Garcia MD.        Results for orders placed during the hospital encounter of 09/05/22    Adult Transthoracic Echo Complete W/ Cont if Necessary Per Protocol    Interpretation Summary  · Left ventricular wall thickness is consistent with mild concentric hypertrophy.  · Mildly reduced right ventricular systolic function noted.  · There is calcification of the aortic valve.  · Severe mitral valve regurgitation is present.  · Estimated right ventricular systolic pressure from tricuspid regurgitation is mildly elevated (35-45 mmHg). Calculated right ventricular systolic pressure from tricuspid regurgitation is 37 mmHg.  · There is a right pleural effusion.  · Global LV hypokinesis  · EF=30-35%      I have reviewed the medications:  Scheduled Meds:atorvastatin, 10 mg, Oral, Nightly  furosemide, 40 mg, Oral, Daily  insulin detemir, 15 Units, Subcutaneous, Q12H  insulin lispro, 0-7 Units, Subcutaneous, TID AC  lactulose, 20 g, Oral, BID  levothyroxine, 75 mcg, Oral, Q AM  metoprolol succinate XL, 100 mg, Oral, Q24H  pantoprazole, 40 mg, Oral, Q AM  rifAXIMin, 550 mg, Oral, Q12H  sacubitril-valsartan, 1 tablet, Oral, Q12H  senna-docusate sodium, 2 tablet, Oral, BID  sodium chloride, 10 mL, Intravenous, Q12H  spironolactone, 100 mg, Oral, Daily  venlafaxine XR, 150 mg, Oral, Daily With Breakfast      Continuous Infusions:   PRN Meds:.•  acetaminophen  **OR** acetaminophen **OR** acetaminophen  •  senna-docusate sodium **AND** polyethylene glycol **AND** bisacodyl **AND** bisacodyl  •  dextrose  •  dextrose  •  glucagon (human recombinant)  •  ondansetron **OR** ondansetron  •  sodium chloride  •  sodium chloride    Assessment & Plan   Assessment & Plan     Active Hospital Problems    Diagnosis  POA   • **Recurrent right pleural effusion [J90]  Unknown   • Other cirrhosis of liver (HCC) [K74.69]  Yes   • Coronary artery disease involving native coronary artery of native heart without angina pectoris [I25.10]  Yes   • HTN (hypertension) [I10]  Yes   • Type 2 diabetes mellitus without complication, with long-term current use of insulin (HCC) [E11.9, Z79.4]  Not Applicable   • Impaired renal function [N28.9]  Yes      Resolved Hospital Problems   No resolved problems to display.     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  - S/p large-volume thoracentesis at OSH 8/18, 8/24; via [facility] pul on an outpatient basis; unclear labs/Dx  -Chest x-ray on admission shows large right-sided pleural effusion with vascular crowding and dependent atelectasis.  -IR thoracentesis performed with labs, cytology  -ECHO EF 30 to 35% with severe MR, LV dysfunction  - obtain records from [place]. Sounds like they thought it was hepatic hydrothorax and were considering placement of a pleurex.  -GI consulted recommending 2 g sodium diet, Lasix 40 mg and spironolactone 100 mg daily.  Did not recommend chest tube for the treatment of hepatic hydrothorax as this can  lead to massive protein electrolyte depletion, infection, renal failure and bleeding.  Patient also a poor candidate for TIPS because of CHF and moderate pulmonary hypertension and hepatic encephalopathy.  Lactulose was increased to 20 mg twice daily for a bowel movement goal of 2 to 3/day     LEZAMA cirrhosis w/ anemia/thrombocytopenia  -Home Xifaxan, lactulose, PPI  -AST/ALT elevated and stable.  Sodium 134.  INR within normal limits.  -Noted to be on home lactulose, increased today to 20 mg bid for a BM goal of 2-3 per day  -GI consulted recommending 2 g sodium diet, Lasix 40 mg and spironolactone 100 mg daily.  Did not recommend chest tube for the treatment of hepatic hydrothorax as this can lead to massive protein electrolyte depletion, infection, renal failure and bleeding.  Patient also a poor candidate for TIPS because of CHF and moderate pulmonary hypertension and hepatic encephalopathy.  Lactulose was increased to 20 mg twice daily for a bowel movement goal of 2 to 3/day    CAD s/p remote stenting  Hx MI  Hypertension  Hyperlipidemia  - Home Toprol-XL, statin  - Last known [facility] 2015, no intervention, had previously had stenting  -Previously followed by Dr. Osorio, last appointment 8/4/2015  -Data deficit for lack of statin use    HFrEF  - Echo shows severe MR, EF 30 to 35%, LV dysfunction.  Patient appears asymptomatic.  9/8, metoprolol XL increased to 100 mg daily.  Entresto 24-26 mg twice daily added.  GI added Lasix and Aldactone.  Planning DALE either during hospitalization or outpatient.     DM type 2, unknown A1c, with long-term use of insulin  -A1c 9.3  - Baseline takes oral metformin and basal insulin 30U qAM / 25U qPM  - Levemir ordered w/ Accu-Cheks and SSI  -9/8, glucose elevated over the last 24 hours.  Will increase Levemir to 20 units twice daily     Impaired renal function, data deficit   -Cr 1.46 was noted on presentation, improved.     DVT prophylaxis: SCDs    DVT prophylaxis:  Mechanical DVT  prophylaxis orders are present.     AM-PAC 6 Clicks Score (PT): 24 (09/08/22 0824)    CODE STATUS:   Code Status and Medical Interventions:   Ordered at: 09/05/22 5187     Code Status (Patient has no pulse and is not breathing):    CPR (Attempt to Resuscitate)     Medical Interventions (Patient has pulse or is breathing):    Full Support     This patient's problems and plans were partially entered by my partner and updated as appropriate by me 09/08/22.     STEPHANIE Tanner  09/08/22

## 2022-09-08 NOTE — PROGRESS NOTES
" [facility] - Progress Note    Gwyn READ Delph  1950  S519/1    09/08/22, 08:56 EDT      Chief Complaint: Following for LV dysfunction    Subjective:   Sitting up in bed, conversant.  Appears A&O x3.  No breathing issues, no pain.  Reports he gets a little anxious and it's hard to \"get out of it.\"  Slept well.      Review of Systems:  Pertinent positives are listed above and in physical exam.  All others have been reviewed and are negative.    atorvastatin, 10 mg, Oral, Nightly  furosemide, 40 mg, Oral, Daily  insulin detemir, 15 Units, Subcutaneous, Q12H  insulin lispro, 0-7 Units, Subcutaneous, TID AC  lactulose, 20 g, Oral, BID  levothyroxine, 75 mcg, Oral, Q AM  metoprolol succinate XL, 50 mg, Oral, Q24H  pantoprazole, 40 mg, Oral, Q AM  rifAXIMin, 550 mg, Oral, Q12H  senna-docusate sodium, 2 tablet, Oral, BID  sodium chloride, 10 mL, Intravenous, Q12H  spironolactone, 100 mg, Oral, Daily  venlafaxine XR, 150 mg, Oral, Daily With Breakfast        Objective:  Vitals:   height is 180.3 cm (71\") and weight is 75.8 kg (167 lb). His oral temperature is 97.6 °F (36.4 °C). His blood pressure is 162/90 and his pulse is 107. His respiration is 19 and oxygen saturation is 95%.       Intake/Output Summary (Last 24 hours) at 9/8/2022 0856  Last data filed at 9/8/2022 0500  Gross per 24 hour   Intake 952 ml   Output --   Net 952 ml       Physical Exam:  General:  WN, NAD, A and O x3.  CV:  Normal S1,S2.  Tachycardic. No murmur, rub, or gallop.  Resp:  CTA Fransico, equal, nonlabored.  Abd:  Soft, + BS, no organomegaly. Nontender to palpation.  Extrem:  No edema BLE, 2+ pedal/PT pulses.            Results from last 7 days   Lab Units 09/06/22  0258   WBC 10*3/mm3 3.49   HEMOGLOBIN g/dL 10.2*   HEMATOCRIT % 32.6*   PLATELETS 10*3/mm3 82*     Results from last 7 days   Lab Units 09/06/22  0258   SODIUM mmol/L 134*   POTASSIUM mmol/L 3.7   CHLORIDE mmol/L 100   CO2 mmol/L 23.0   BUN mg/dL 13   CREATININE mg/dL 1.26 "   CALCIUM mg/dL 8.7   BILIRUBIN mg/dL 0.3   ALK PHOS U/L 151*   ALT (SGPT) U/L 67*   AST (SGOT) U/L 108*   GLUCOSE mg/dL 104*     Results from last 7 days   Lab Units 09/05/22  1230   INR  1.05             Results from last 7 days   Lab Units 09/05/22  1230   PROBNP pg/mL 332.2       Tele:  ST    ASSESSMENT:  -  New Onset LV dysfunction, EF 30-35%  -  Severe MR by TTE  -  Recurrent Right Pleural Effusion  -  CAD  -  Essential Hypertension  -  LEZAMA Cirrhosis        PLAN:  -  71 yo CM with PMHx of CAD (stent 2008, patent stent 2015) with normal EF by cath at that time.  Presents with cough, orthopnea, SOA in setting of recurrent pleural effusion and LEZAMA cirrhosis.  ProBNP is WNL.  He is in NSR.  Echo shows severe MR, EF 30-35%.  LV Dysfunction by echo is new but overall, patient appears to be asymptomatic.  Respiratory complaints have improved with thoracentesis.    -  Patient is currently asymptomatic; would not pursue ischemic evaluation at this time with multiple co morbidities.  -  Will consider proceeding with DALE - This could be done next Wednesday (Dr. Osorio) if patient is still here.  If discharged prior, will arrange for outpatient DALE.  -  BP/HR not currently controlled.  Increase Toprol XL to 100 mg daily. Add Entresto 24-26mg BID.  Lasix and Aldactone have been added by GI for effusions and hepatic hydrothorax.  -  Monitor I/Os, daily wts, nutrition.  -  Repeat proBNP and portable chest x-ray in a.m.        I discussed the patient's findings and my recommendations with the patient, any present family members, and the nursing staff.  Yovani Valenzuela MD saw and examined patient, verified hx and PE, read all radiographic studies, reviewed labs and micro data, and formulated dx, plan for treatment and all medical decision making.      Milena Fleming PA-C  09/08/22, 08:56 EDT

## 2022-09-08 NOTE — PLAN OF CARE
Problem: Adult Inpatient Plan of Care  Goal: Plan of Care Review  Outcome: Ongoing, Progressing  Flowsheets (Taken 9/7/2022 0449 by Noa Parkinson, RN)  Progress: improving  Plan of Care Reviewed With: patient  Outcome Evaluation: Patient is a pleasant 72 yr old male.  Vital signs are stable. Patient educated on medications. Patient also educated on handwashing. Patient's family was at bedside until visitation ended. Will continue to monitor.  Goal: Patient-Specific Goal (Individualized)  Outcome: Ongoing, Progressing  Goal: Absence of Hospital-Acquired Illness or Injury  Outcome: Ongoing, Progressing  Intervention: Identify and Manage Fall Risk  Description: Perform standard risk assessment on admission using a validated tool or comprehensive approach appropriate to the patient; reassess fall risk frequently, with change in status or transfer to another level of care.  Communicate fall injury risk to interprofessional healthcare team.  Determine need for increased observation, equipment and environmental modification, such as low bed, signage and supportive, nonskid footwear.  Adjust safety measures to individual developmental age, stage and identified risk factors.  Reinforce the importance of safety and physical activity with patient and family.  Perform regular intentional rounding to assess need for position change, pain assessment and personal needs, including assistance with toileting.  Flowsheets  Taken 9/8/2022 1619 by Ortega Olson, PCT  Safety Promotion/Fall Prevention:   activity supervised   assistive device/personal items within reach   clutter free environment maintained   fall prevention program maintained   gait belt   lighting adjusted   mobility aid in reach   nonskid shoes/slippers when out of bed   room organization consistent   safety round/check completed  Taken 9/8/2022 1600 by Keara Lindsey, RN  Safety Promotion/Fall Prevention:   nonskid shoes/slippers when out of bed   room  organization consistent   safety round/check completed   assistive device/personal items within reach   clutter free environment maintained   activity supervised   fall prevention program maintained   elopement precautions   lighting adjusted  Taken 9/8/2022 1459 by Keara Lindsey RN  Safety Promotion/Fall Prevention:   safety round/check completed   room organization consistent   nonskid shoes/slippers when out of bed   fall prevention program maintained   clutter free environment maintained   assistive device/personal items within reach   activity supervised  Taken 9/8/2022 1200 by Keara Lindsey RN  Safety Promotion/Fall Prevention:   clutter free environment maintained   assistive device/personal items within reach   activity supervised   nonskid shoes/slippers when out of bed   room organization consistent   safety round/check completed   toileting scheduled   fall prevention program maintained  Taken 9/8/2022 1013 by Keara Lindsey RN  Safety Promotion/Fall Prevention:   assistive device/personal items within reach   clutter free environment maintained   activity supervised   fall prevention program maintained   nonskid shoes/slippers when out of bed   room organization consistent   safety round/check completed   toileting scheduled  Taken 9/8/2022 0824 by Keara Lindsey RN  Safety Promotion/Fall Prevention:   activity supervised   assistive device/personal items within reach   clutter free environment maintained   fall prevention program maintained   muscle strengthening facilitated   patient off unit   room organization consistent   toileting scheduled   safety round/check completed  Intervention: Prevent Skin Injury  Description: Perform a screening for skin injury risk, such as pressure or moisture associated skin damage on admission and at regular intervals throughout hospital stay.  Keep all areas of skin (especially folds) clean and dry.  Maintain adequate skin hydration.  Relieve and redistribute  pressure and protect bony prominences; implement measures based on patient-specific risk factors.  Match turning and repositioning schedule to clinical condition.  Encourage weight shift frequently; assist with reposition if unable to complete independently.  Float heels off bed; avoid pressure on the Achilles tendon.  Keep skin free from extended contact with medical devices.  Encourage functional activity and mobility, as early as tolerated.  Use aids (e.g., slide boards, mechanical lift) during transfer.  Flowsheets  Taken 9/8/2022 1619 by Ortega Olson PCT  Body Position: position changed independently  Taken 9/8/2022 1200 by Keara Lindsey RN  Body Position: position changed independently  Taken 9/8/2022 1013 by Keara Lindsey RN  Body Position: position changed independently  Taken 9/8/2022 0824 by Keara Lindsey RN  Body Position: position changed independently  Skin Protection:   other (see comments)   transparent dressing maintained   adhesive use limited  Intervention: Prevent and Manage VTE (Venous Thromboembolism) Risk  Description: Assess for VTE (venous thromboembolism) risk.  Encourage and assist with early ambulation.  Initiate and maintain compression or other therapy, as indicated, based on identified risk in accordance with organizational protocol and provider order.  Encourage both active and passive leg exercises while in bed, if unable to ambulate.  Flowsheets  Taken 9/8/2022 1619 by Ortega Olson PCT  Activity Management: activity adjusted per tolerance  Taken 9/8/2022 0824 by Keara Lindsey, RN  Activity Management:   activity adjusted per tolerance   dorsiflexion/plantar flexion performed  Range of Motion: active ROM (range of motion) encouraged  Intervention: Prevent Infection  Description: Maintain skin and mucous membrane integrity; promote hand, oral and pulmonary hygiene.  Optimize fluid balance, nutrition, sleep and glycemic control to maximize infection resistance.  Identify  potential sources of infection early to prevent or mitigate progression of infection (e.g., wound, lines, devices).  Evaluate ongoing need for invasive devices; remove promptly when no longer indicated.  Flowsheets  Taken 9/8/2022 1619 by Ortega Olson PCT  Infection Prevention:   environmental surveillance performed   equipment surfaces disinfected   hand hygiene promoted   rest/sleep promoted   personal protective equipment utilized   single patient room provided   visitors restricted/screened  Taken 9/8/2022 1600 by Keara Lindsey RN  Infection Prevention: hand hygiene promoted  Taken 9/8/2022 1459 by Keara Lindsey RN  Infection Prevention: hand hygiene promoted  Taken 9/8/2022 1200 by Keara Lindsey RN  Infection Prevention: hand hygiene promoted  Taken 9/8/2022 1013 by Keara Lindsey RN  Infection Prevention: environmental surveillance performed  Taken 9/8/2022 0824 by Keara Lindsey RN  Infection Prevention:   environmental surveillance performed   hand hygiene promoted  Goal: Optimal Comfort and Wellbeing  Outcome: Ongoing, Progressing  Intervention: Monitor Pain and Promote Comfort  Description: Assess pain level, treatment efficacy and patient response at regular intervals using a consistent pain scale.  Consider the presence and impact of preexisting chronic pain.  Encourage patient and caregiver involvement in pain assessment, interventions and safety measures.  Flowsheets (Taken 9/8/2022 1750)  Pain Management Interventions: care clustered  Intervention: Provide Person-Centered Care  Description: Use a family-focused approach to care.  Develop trust and rapport by proactively providing information, encouraging questions, addressing concerns and offering reassurance.  Acknowledge emotional response to hospitalization.  Recognize and utilize personal coping strategies.  Honor spiritual and cultural preferences.  Flowsheets  Taken 9/8/2022 1459  Trust Relationship/Rapport: thoughts/feelings  acknowledged  Taken 9/8/2022 1200  Trust Relationship/Rapport:   care explained   choices provided   thoughts/feelings acknowledged  Taken 9/8/2022 1013  Trust Relationship/Rapport:   care explained   choices provided   reassurance provided   thoughts/feelings acknowledged   questions answered  Taken 9/8/2022 0824  Trust Relationship/Rapport:   choices provided   care explained   questions encouraged   questions answered   thoughts/feelings acknowledged  Goal: Readiness for Transition of Care  Outcome: Ongoing, Progressing  Intervention: Mutually Develop Transition Plan  Description: Identify available resources for support (e.g., family, friends, community).  Identify and address barriers to ongoing treatment and home management (e.g., environmental, financial).  Provide opportunities to practice self-management skills.  Assess and monitor emotional readiness for transition.  Establish or reconnect linkage with outpatient providers or community-based services.  Flowsheets  Taken 9/6/2022 1024 by Hortensia Ji RN  Equipment Needed After Discharge: none  Discharge Coordination/Progress: He uses no DME at the facility. The staff assists with med administration. Meals and house keeping are provided. Staff assists with personall care as needed.  Equipment Currently Used at Home: none  Taken 9/5/2022 1708 by Kristie Hayden, RN  Transportation Anticipated: family or friend will provide  Patient/Family Anticipated Services at Transition: none  Patient/Family Anticipates Transition to: (Assisted Living Facility) other (see comments)     Problem: Chest Pain  Goal: Resolution of Chest Pain Symptoms  Outcome: Ongoing, Progressing     Problem: Fall Injury Risk  Goal: Absence of Fall and Fall-Related Injury  Outcome: Ongoing, Progressing  Intervention: Identify and Manage Contributors  Description: Develop a fall prevention plan with the patient and caregiver/family.  Provide reorientation, appropriate sensory stimulation and  routines with changes in mental status to decrease risk of fall.  Promote use of personal vision and auditory aids.  Assess assistance level required for safe and effective self-care; provide support as needed, such as toileting, mobilization. For age 65 and older, implement timed toileting with assistance.  Encourage physical activity, such as performance of mobility and self-care at highest level of patient ability, multicomponent exercise program and provision of appropriate assistive devices.  If fall occurs, assess the severity of injury; implement fall injury protocol. Determine the cause and revise fall injury prevention plan.  Regularly review medication contribution to fall risk; adjust medication administration times to minimize risk of falling.  Consider risk related to polypharmacy and age.  Balance adequate pain management with potential for oversedation.  Flowsheets  Taken 9/8/2022 1600 by Keara Lindsey RN  Medication Review/Management: medications reviewed  Taken 9/8/2022 1459 by Keara Lindsey, RN  Medication Review/Management: medications reviewed  Taken 9/8/2022 1200 by Keara Lindsey RN  Medication Review/Management: medications reviewed  Taken 9/8/2022 1013 by Keara Lindsey RN  Medication Review/Management: medications reviewed  Taken 9/8/2022 0824 by Keara Lindsey RN  Medication Review/Management: medications reviewed  Taken 9/7/2022 1800 by Yazmin Johnson, RN  Self-Care Promotion: independence encouraged  Intervention: Promote Injury-Free Environment  Description: Provide a safe, barrier-free environment that encourages independent activity.  Keep care area uncluttered and well-lighted.  Determine need for increased observation or monitoring.  Avoid use of devices that minimize mobility, such as restraints or indwelling urinary catheter.  Flowsheets  Taken 9/8/2022 1619 by Ortega Olson, PCT  Safety Promotion/Fall Prevention:   activity supervised   assistive device/personal items within  reach   clutter free environment maintained   fall prevention program maintained   gait belt   lighting adjusted   mobility aid in reach   nonskid shoes/slippers when out of bed   room organization consistent   safety round/check completed  Taken 9/8/2022 1600 by Keara Lindsey RN  Safety Promotion/Fall Prevention:   nonskid shoes/slippers when out of bed   room organization consistent   safety round/check completed   assistive device/personal items within reach   clutter free environment maintained   activity supervised   fall prevention program maintained   elopement precautions   lighting adjusted  Taken 9/8/2022 1459 by Keara Lindsey RN  Safety Promotion/Fall Prevention:   safety round/check completed   room organization consistent   nonskid shoes/slippers when out of bed   fall prevention program maintained   clutter free environment maintained   assistive device/personal items within reach   activity supervised  Taken 9/8/2022 1200 by Keara Lindsey RN  Safety Promotion/Fall Prevention:   clutter free environment maintained   assistive device/personal items within reach   activity supervised   nonskid shoes/slippers when out of bed   room organization consistent   safety round/check completed   toileting scheduled   fall prevention program maintained  Taken 9/8/2022 1013 by Keara Lindsey RN  Safety Promotion/Fall Prevention:   assistive device/personal items within reach   clutter free environment maintained   activity supervised   fall prevention program maintained   nonskid shoes/slippers when out of bed   room organization consistent   safety round/check completed   toileting scheduled  Taken 9/8/2022 0824 by Keara Lindsey, RN  Safety Promotion/Fall Prevention:   activity supervised   assistive device/personal items within reach   clutter free environment maintained   fall prevention program maintained   muscle strengthening facilitated   patient off unit   room organization consistent   toileting  scheduled   safety round/check completed   Goal Outcome Evaluation:      Pt oriented to self and time. Hyperglycemia treated per MAR. Pt ambulated around room and to RR. Pt voided but no BM this shift. Pt visited by daughter, son and friend this shift.

## 2022-09-08 NOTE — PROGRESS NOTES
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  Family is at the bedside.  During conversation, patient is generally alert and oriented though intermittently confused during exam; mild asterixis noted as well.  Reports an insatiable appetite today.  Has had 2 BMs.  Otherwise, no new or worsening GI symptoms.  Does not endorse pain at time of exam.    Objective:    /59 (BP Location: Right arm, Patient Position: Sitting)   Pulse 92   Temp 97.9 °F (36.6 °C) (Oral)   Resp 16   Ht 180.3 cm (71\")   Wt 75.8 kg (167 lb)   SpO2 96%   BMI 23.29 kg/m²     Physical Exam  Vitals and nursing note reviewed.   Constitutional:       General: He is not in acute distress.     Appearance: Normal appearance. He is normal weight. He is ill-appearing. He is not toxic-appearing.      Comments: Pronounced muscle wasting on exam.   HENT:      Head: Normocephalic and atraumatic.   Eyes:      General: No scleral icterus.     Extraocular Movements: Extraocular movements intact.      Conjunctiva/sclera: Conjunctivae normal.      Pupils: Pupils are equal, round, and reactive to light.   Cardiovascular:      Rate and Rhythm: Normal rate and regular rhythm.      Pulses: Normal pulses.      Heart sounds: Normal heart sounds.   Pulmonary:      Effort: Pulmonary effort is normal. No respiratory distress.      Breath sounds: Normal breath sounds.   Abdominal:      General: Abdomen is flat. Bowel sounds are normal. There is no distension.      Palpations: Abdomen is soft. There is no mass.      Tenderness: There is no abdominal tenderness. There is no guarding or rebound.      Hernia: No hernia is present.   Musculoskeletal:         General: Normal range of motion.      Right lower leg: No edema.      Left lower leg: No edema.   Skin:     General: Skin is warm " and dry.      Coloration: Skin is pale. Skin is not jaundiced.   Neurological:      Mental Status: He is alert. Mental status is at baseline. He is disoriented.      Comments: Mild asterixis noted on exam.  Patient is alert to person but intermittently confused to place, time, situation during exam.         Lab  I have personally reviewed most recent cardiac tracings, lab results and radiology images and interpretations and agree with findings.    Lab Results   Component Value Date    WBC 3.49 09/06/2022    HGB 10.2 (L) 09/06/2022    HGB 9.8 (L) 09/05/2022    HGB 15.0 08/04/2015    MCV 90.6 09/06/2022    PLT 82 (L) 09/06/2022    INR 1.05 09/05/2022       Lab Results   Component Value Date    GLUCOSE 104 (H) 09/06/2022    BUN 13 09/06/2022    CREATININE 1.26 09/06/2022    BCR 10.3 09/06/2022     (L) 09/06/2022    K 3.7 09/06/2022    CO2 23.0 09/06/2022    CALCIUM 8.7 09/06/2022    ALBUMIN 2.90 (L) 09/06/2022    ALKPHOS 151 (H) 09/06/2022    BILITOT 0.3 09/06/2022    ALT 67 (H) 09/06/2022     (H) 09/06/2022       Assessment:      Recurrent right pleural effusion    Other cirrhosis of liver (HCC)    Coronary artery disease involving native coronary artery of native heart without angina pectoris    HTN (hypertension)    Type 2 diabetes mellitus without complication, with long-term current use of insulin (HCC)    Impaired renal function    1. LEZAMA cirrhosis with hepatic encephalopathy.  MELD-Na is 14.   Child Quigley Class B.    2. 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 Diastolic heart failure,  EF is 30-35%  4. Severe mitral valve regurgitation     Plan:  Patient doing well today.  Still with mild asterixis concerning for underlying hepatic encephalopathy.  Nutritional intake improving.  >>> For hepatic encephalopathy management   -Continue lactulose 20 g twice daily    -Ensure 3-4 soft BMs daily   -Xifaxan 550 mg p.o. twice daily  >>> Continue to encourage adequate nutrition with  high-protein / low sodium (less than 2g/day) diet   -Frequent high-protein snacks   -High-protein bedtime snack   -Liquid protein supplementation   -Magic cups  >>> for hepatic hydrothorax   -Continue Lasix 40 mg daily   -Continue Aldactone 100 mg daily  >>> Reiterated the importance of avoiding chest tubes and presence of hepatic hydrothorax.  Additionally, provided lengthy counseling to patient and family in regards to liver cirrhosis and management thereof.      Ortega Gentile, STEPHANIE  09/08/22  19:06 EDT

## 2022-09-09 ENCOUNTER — APPOINTMENT (OUTPATIENT)
Dept: GENERAL RADIOLOGY | Facility: HOSPITAL | Age: 72
End: 2022-09-09

## 2022-09-09 LAB
ANION GAP SERPL CALCULATED.3IONS-SCNC: 12 MMOL/L (ref 5–15)
BACTERIA FLD CULT: NORMAL
BASOPHILS # BLD AUTO: 0.03 10*3/MM3 (ref 0–0.2)
BASOPHILS NFR BLD AUTO: 0.4 % (ref 0–1.5)
BILIRUB FLD-MCNC: 0.2 MG/DL
BUN SERPL-MCNC: 18 MG/DL (ref 8–23)
BUN/CREAT SERPL: 13.6 (ref 7–25)
CALCIUM SPEC-SCNC: 8.4 MG/DL (ref 8.6–10.5)
CHLORIDE SERPL-SCNC: 94 MMOL/L (ref 98–107)
CO2 SERPL-SCNC: 23 MMOL/L (ref 22–29)
CREAT SERPL-MCNC: 1.32 MG/DL (ref 0.76–1.27)
DEPRECATED RDW RBC AUTO: 49.4 FL (ref 37–54)
EGFRCR SERPLBLD CKD-EPI 2021: 57.3 ML/MIN/1.73
EOSINOPHIL # BLD AUTO: 0.32 10*3/MM3 (ref 0–0.4)
EOSINOPHIL NFR BLD AUTO: 4.6 % (ref 0.3–6.2)
ERYTHROCYTE [DISTWIDTH] IN BLOOD BY AUTOMATED COUNT: 16.1 % (ref 12.3–15.4)
GLUCOSE BLDC GLUCOMTR-MCNC: 269 MG/DL (ref 70–130)
GLUCOSE BLDC GLUCOMTR-MCNC: 271 MG/DL (ref 70–130)
GLUCOSE BLDC GLUCOMTR-MCNC: 366 MG/DL (ref 70–130)
GLUCOSE SERPL-MCNC: 323 MG/DL (ref 65–99)
GRAM STN SPEC: NORMAL
GRAM STN SPEC: NORMAL
HCT VFR BLD AUTO: 34.5 % (ref 37.5–51)
HGB BLD-MCNC: 11.3 G/DL (ref 13–17.7)
IMM GRANULOCYTES # BLD AUTO: 0.05 10*3/MM3 (ref 0–0.05)
IMM GRANULOCYTES NFR BLD AUTO: 0.7 % (ref 0–0.5)
LYMPHOCYTES # BLD AUTO: 0.92 10*3/MM3 (ref 0.7–3.1)
LYMPHOCYTES NFR BLD AUTO: 13.3 % (ref 19.6–45.3)
MCH RBC QN AUTO: 28.1 PG (ref 26.6–33)
MCHC RBC AUTO-ENTMCNC: 32.8 G/DL (ref 31.5–35.7)
MCV RBC AUTO: 85.8 FL (ref 79–97)
MONOCYTES # BLD AUTO: 0.89 10*3/MM3 (ref 0.1–0.9)
MONOCYTES NFR BLD AUTO: 12.9 % (ref 5–12)
NEUTROPHILS NFR BLD AUTO: 4.71 10*3/MM3 (ref 1.7–7)
NEUTROPHILS NFR BLD AUTO: 68.1 % (ref 42.7–76)
NRBC BLD AUTO-RTO: 0 /100 WBC (ref 0–0.2)
NT-PROBNP SERPL-MCNC: 153.4 PG/ML (ref 0–900)
PLATELET # BLD AUTO: 112 10*3/MM3 (ref 140–450)
PMV BLD AUTO: 12.7 FL (ref 6–12)
POTASSIUM SERPL-SCNC: 3.7 MMOL/L (ref 3.5–5.2)
RBC # BLD AUTO: 4.02 10*6/MM3 (ref 4.14–5.8)
SODIUM SERPL-SCNC: 129 MMOL/L (ref 136–145)
WBC NRBC COR # BLD: 6.92 10*3/MM3 (ref 3.4–10.8)

## 2022-09-09 PROCEDURE — 82962 GLUCOSE BLOOD TEST: CPT

## 2022-09-09 PROCEDURE — 71045 X-RAY EXAM CHEST 1 VIEW: CPT

## 2022-09-09 PROCEDURE — 83880 ASSAY OF NATRIURETIC PEPTIDE: CPT | Performed by: PHYSICIAN ASSISTANT

## 2022-09-09 PROCEDURE — 63710000001 INSULIN DETEMIR PER 5 UNITS: Performed by: NURSE PRACTITIONER

## 2022-09-09 PROCEDURE — 63710000001 INSULIN LISPRO (HUMAN) PER 5 UNITS: Performed by: INTERNAL MEDICINE

## 2022-09-09 PROCEDURE — 80048 BASIC METABOLIC PNL TOTAL CA: CPT | Performed by: NURSE PRACTITIONER

## 2022-09-09 PROCEDURE — 85025 COMPLETE CBC W/AUTO DIFF WBC: CPT | Performed by: NURSE PRACTITIONER

## 2022-09-09 PROCEDURE — 99232 SBSQ HOSP IP/OBS MODERATE 35: CPT | Performed by: NURSE PRACTITIONER

## 2022-09-09 RX ADMIN — INSULIN DETEMIR 25 UNITS: 100 INJECTION, SOLUTION SUBCUTANEOUS at 20:57

## 2022-09-09 RX ADMIN — LACTULOSE 20 G: 10 SOLUTION ORAL at 20:56

## 2022-09-09 RX ADMIN — METOPROLOL SUCCINATE 100 MG: 50 TABLET, EXTENDED RELEASE ORAL at 08:10

## 2022-09-09 RX ADMIN — SENNOSIDES AND DOCUSATE SODIUM 2 TABLET: 50; 8.6 TABLET ORAL at 08:10

## 2022-09-09 RX ADMIN — SACUBITRIL AND VALSARTAN 1 TABLET: 24; 26 TABLET, FILM COATED ORAL at 08:11

## 2022-09-09 RX ADMIN — INSULIN LISPRO 4 UNITS: 100 INJECTION, SOLUTION INTRAVENOUS; SUBCUTANEOUS at 08:10

## 2022-09-09 RX ADMIN — PANTOPRAZOLE SODIUM 40 MG: 40 TABLET, DELAYED RELEASE ORAL at 06:24

## 2022-09-09 RX ADMIN — Medication 10 ML: at 20:53

## 2022-09-09 RX ADMIN — VENLAFAXINE HYDROCHLORIDE 150 MG: 75 CAPSULE, EXTENDED RELEASE ORAL at 08:10

## 2022-09-09 RX ADMIN — INSULIN DETEMIR 20 UNITS: 100 INJECTION, SOLUTION SUBCUTANEOUS at 08:10

## 2022-09-09 RX ADMIN — SACUBITRIL AND VALSARTAN 1 TABLET: 24; 26 TABLET, FILM COATED ORAL at 20:55

## 2022-09-09 RX ADMIN — RIFAXIMIN 550 MG: 550 TABLET ORAL at 08:11

## 2022-09-09 RX ADMIN — ATORVASTATIN CALCIUM 10 MG: 10 TABLET, FILM COATED ORAL at 20:55

## 2022-09-09 RX ADMIN — INSULIN LISPRO 6 UNITS: 100 INJECTION, SOLUTION INTRAVENOUS; SUBCUTANEOUS at 12:01

## 2022-09-09 RX ADMIN — LACTULOSE 20 G: 10 SOLUTION ORAL at 08:14

## 2022-09-09 RX ADMIN — INSULIN LISPRO 4 UNITS: 100 INJECTION, SOLUTION INTRAVENOUS; SUBCUTANEOUS at 16:46

## 2022-09-09 RX ADMIN — LEVOTHYROXINE SODIUM 75 MCG: 0.07 TABLET ORAL at 06:24

## 2022-09-09 RX ADMIN — FUROSEMIDE 40 MG: 40 TABLET ORAL at 08:09

## 2022-09-09 RX ADMIN — Medication 10 ML: at 09:27

## 2022-09-09 RX ADMIN — RIFAXIMIN 550 MG: 550 TABLET ORAL at 20:56

## 2022-09-09 RX ADMIN — SPIRONOLACTONE 100 MG: 25 TABLET ORAL at 08:09

## 2022-09-09 NOTE — PROGRESS NOTES
[facility] Medicine Services  PROGRESS NOTE    Patient Name: Gwyn Fuchs  : 1950  MRN: [id number]    Date of Admission: 2022  Primary Care Physician: Riley Mckoy DO    Subjective   Subjective     CC:  Recurrent pleural effusion    HPI:  Pt sitting up in bed, A&Ox3, pleasant and talkative. Today, CXR showing increasing pleural fluid and increasing atelectasis in the right lung. Pt reports shortness of air with exertion. Glucose is still high, increasing insulin again today. Sodium was 129 today, but corrected sodium is 133.     ROS:  Gen- No fevers, chills  CV- No chest pain, palpitations  Resp- (+) occasional dry cough, (+) dyspnea with exertion  GI- No N/V/D, abd pain    Objective   Objective     Vital Signs:   Temp:  [97.7 °F (36.5 °C)-98 °F (36.7 °C)] 97.7 °F (36.5 °C)  Heart Rate:  [86-97] 91  Resp:  [16] 16  BP: (119-167)/(59-90) 167/88     Physical Exam:  Constitutional: Awake, alert, NAD  HENT: NCAT, mucous membranes moist  Respiratory: Clear to auscultation bilaterally, nonlabored respirations   Cardiovascular: tachy, no murmurs, rubs, or gallops  Gastrointestinal: Positive bowel sounds, soft, nontender, nondistended  Musculoskeletal: No bilateral ankle edema  Psychiatric: Appropriate affect, cooperative  Neurologic: Oriented x 3, strength symmetric in all extremities, Cranial Nerves grossly intact to confrontation, speech clear  Skin: No rashes    Results Reviewed:  LAB RESULTS:      Lab 22  0425 22  0258 22  1230   WBC 6.92 3.49 5.02   HEMOGLOBIN 11.3* 10.2* 9.8*   HEMATOCRIT 34.5* 32.6* 30.3*   PLATELETS 112* 82* 83*   NEUTROS ABS 4.71  --  3.15   IMMATURE GRANS (ABS) 0.05  --  0.03   LYMPHS ABS 0.92  --  0.77   MONOS ABS 0.89  --  0.76   EOS ABS 0.32  --  0.29   MCV 85.8 90.6 86.8   LDH  --  356*  --    PROTIME  --   --  13.6         Lab 22  0425 22  0258 22  1230   SODIUM 129* 134* 136   POTASSIUM 3.7 3.7 3.9   CHLORIDE  94* 100 101   CO2 23.0 23.0 22.0   ANION GAP 12.0 11.0 13.0   BUN 18 13 14   CREATININE 1.32* 1.26 1.46*   EGFR 57.3* 60.6 50.8*   GLUCOSE 323* 104* 227*   CALCIUM 8.4* 8.7 9.0   HEMOGLOBIN A1C  --   --  9.30*         Lab 09/06/22  0258 09/05/22  1230   TOTAL PROTEIN 7.4 7.7   ALBUMIN 2.90* 3.20*   GLOBULIN 4.5 4.5   ALT (SGPT) 67* 58*   AST (SGOT) 108* 109*   BILIRUBIN 0.3 0.2   ALK PHOS 151* 146*         Lab 09/09/22  0425 09/05/22  1230   PROBNP 153.4 332.2   TROPONIN T  --  0.012   PROTIME  --  13.6   INR  --  1.05                 Brief Urine Lab Results     None          Microbiology Results Abnormal     Procedure Component Value - Date/Time    Body Fluid Culture - Body Fluid, Pleural Cavity [[id number]] Collected: 09/06/22 1123    Lab Status: Final result Specimen: Body Fluid from Pleural Cavity Updated: 09/09/22 1012     Body Fluid Culture No growth at 3 days     Gram Stain Many (4+) WBCs seen      No organisms seen    Anaerobic Culture - Pleural Fluid, Pleural Cavity [[id number]]  (Normal) Collected: 09/06/22 1123    Lab Status: Preliminary result Specimen: Pleural Fluid from Pleural Cavity Updated: 09/09/22 0831     Anaerobic Culture No anaerobes isolated at 3 days    Fungus Smear - Body Fluid, Pleural Cavity [[id number]] Collected: 09/06/22 1123    Lab Status: Final result Specimen: Body Fluid from Pleural Cavity Updated: 09/07/22 1518     Fungal Stain No yeast or hyphal elements seen    AFB Culture - Body Fluid, Pleural Cavity [[id number]] Collected: 09/06/22 1123    Lab Status: Preliminary result Specimen: Body Fluid from Pleural Cavity Updated: 09/07/22 1159     AFB Stain No acid fast bacilli seen on concentrated smear    KOH Prep - Body Fluid, Pleural Cavity [[id number]] Collected: 09/06/22 1123    Lab Status: Final result Specimen: Body Fluid from Pleural Cavity Updated: 09/06/22 1250     KOH Prep No yeast or hyphal elements seen          XR Chest 1 View    Result Date: 9/9/2022  DATE OF EXAM: 9/9/2022  1:11 AM  PROCEDURE: XR CHEST 1 VW-  INDICATIONS: pleural effusion, cough; X09-Vfndrtx effusion, not elsewhere classified; R06.02-Shortness of breath  COMPARISON: 9/6/2022  TECHNIQUE: Single radiographic AP view of the chest was obtained.  FINDINGS: The heart size is normal and the left lung remains clear. There is an increase in density diffusely in the right hemithorax representing a combination of pleural fluid and atelectasis. The pulmonary vascular markings are normal.      Impression: Increasing pleural fluid and increasing atelectasis in the right lung.  This report was finalized on 9/9/2022 7:09 AM by Haroldo Arciniega MD.        Results for orders placed during the hospital encounter of 09/05/22    Adult Transthoracic Echo Complete W/ Cont if Necessary Per Protocol    Interpretation Summary  · Left ventricular wall thickness is consistent with mild concentric hypertrophy.  · Mildly reduced right ventricular systolic function noted.  · There is calcification of the aortic valve.  · Severe mitral valve regurgitation is present.  · Estimated right ventricular systolic pressure from tricuspid regurgitation is mildly elevated (35-45 mmHg). Calculated right ventricular systolic pressure from tricuspid regurgitation is 37 mmHg.  · There is a right pleural effusion.  · Global LV hypokinesis  · EF=30-35%      I have reviewed the medications:  Scheduled Meds:atorvastatin, 10 mg, Oral, Nightly  furosemide, 40 mg, Oral, Daily  insulin detemir, 25 Units, Subcutaneous, Q12H  insulin lispro, 0-7 Units, Subcutaneous, TID AC  lactulose, 20 g, Oral, BID  levothyroxine, 75 mcg, Oral, Q AM  metoprolol succinate XL, 100 mg, Oral, Q24H  pantoprazole, 40 mg, Oral, Q AM  rifAXIMin, 550 mg, Oral, Q12H  sacubitril-valsartan, 1 tablet, Oral, Q12H  senna-docusate sodium, 2 tablet, Oral, BID  sodium chloride, 10 mL, Intravenous, Q12H  spironolactone, 100 mg, Oral, Daily  venlafaxine XR, 150 mg, Oral, Daily With Breakfast      Continuous  Infusions:   PRN Meds:.•  acetaminophen **OR** acetaminophen **OR** acetaminophen  •  senna-docusate sodium **AND** polyethylene glycol **AND** bisacodyl **AND** bisacodyl  •  dextrose  •  dextrose  •  glucagon (human recombinant)  •  ondansetron **OR** ondansetron  •  sodium chloride  •  sodium chloride    Assessment & Plan   Assessment & Plan     Active Hospital Problems    Diagnosis  POA   • **Recurrent right pleural effusion [J90]  Unknown   • Other cirrhosis of liver (HCC) [K74.69]  Yes   • Coronary artery disease involving native coronary artery of native heart without angina pectoris [I25.10]  Yes   • HTN (hypertension) [I10]  Yes   • Type 2 diabetes mellitus without complication, with long-term current use of insulin (HCC) [E11.9, Z79.4]  Not Applicable   • Impaired renal function [N28.9]  Yes      Resolved Hospital Problems   No resolved problems to display.     Hospital Course to date:   This is a 72-year-old male with Bhandari cirrhosis, CAD s/p prior MI and stenting, DM2 who was initially evaluated outpatient for progressive orthopnea/DHILLON and identified to have large pleural effusion, s/p x2 thoracentesis at OSH with unclear laboratory testing/results, presenting to our facility with recurrent symptoms and large pleural effusion.  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 IR perform thoracentesis with fluid cytology negative.  Echo showed EF 30 to 35% with severe MR. GI consulted recommended 2 g sodium diet, Lasix, spironolactone.  Chest tube for the treatment of hepatic hydrothorax is not recommended due to massive protein electrolyte depletion, infection, renal failure, bleeding.  Patient is not a candidate for TIPS because of CHF and moderate pulmonary hypertension and hepatic encephalopathy.  Lactulose was increased to 20 mg twice a day with a goal bowel movement of 2 to 3/day.  Cardiology consulted for CHF, severe MR. Cardiology increased metoprolol and added Entresto.   Ischemic evaluation not recommended by cardiology at this time due to multiple comorbidities.      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  - S/p large-volume thoracentesis at OSH 8/18, 8/24; via [facility] pul on an outpatient basis; unclear labs/Dx  -Chest x-ray on admission shows large right-sided pleural effusion with vascular crowding and dependent atelectasis.  -IR thoracentesis performed with labs, cytology  -ECHO EF 30 to 35% with severe MR, LV dysfunction  - obtain records from [place]. Sounds like they thought it was hepatic hydrothorax and were considering placement of a pleurex.  -GI consulted recommending 2 g sodium diet, Lasix 40 mg and spironolactone 100 mg daily.  Did not recommend chest tube for the treatment of hepatic hydrothorax as this can lead to massive protein electrolyte depletion, infection, renal failure and bleeding.  Patient also a poor candidate for TIPS because of CHF and moderate pulmonary hypertension and hepatic encephalopathy.  Lactulose was increased to 20 mg twice daily for a bowel movement goal of 2 to 3/day     LEZAMA cirrhosis w/ anemia/thrombocytopenia  -Home Xifaxan, lactulose, PPI  -AST/ALT elevated and stable.  Sodium 134.  INR within normal limits.  -Noted to be on home lactulose, increased today to 20 mg bid for a BM goal of 2-3 per day  -GI consulted recommending 2 g sodium diet, Lasix 40 mg and spironolactone 100 mg daily.  Did not recommend chest tube for the treatment of hepatic hydrothorax as this can lead to massive protein electrolyte depletion, infection, renal failure and bleeding.  Patient also a poor candidate for TIPS because of CHF and moderate pulmonary hypertension and hepatic encephalopathy.  Lactulose was increased to 20 mg twice daily for a bowel movement goal of 2 to 3/day    CAD s/p remote stenting  Hx MI  Hypertension  Hyperlipidemia  - Home Toprol-XL, statin  - Last known [facility] 2015, no  intervention, had previously had stenting  -Previously followed by Dr. Osorio, last appointment 8/4/2015  -Data deficit for lack of statin use    HFrEF  - Echo shows severe MR, EF 30 to 35%, LV dysfunction.  Patient appears asymptomatic.  9/8, metoprolol XL increased to 100 mg daily.  Entresto 24-26 mg twice daily added.  GI added Lasix and Aldactone.  Planning DALE either during hospitalization or outpatient.     DM type 2, unknown A1c, with long-term use of insulin  -A1c 9.3  - Baseline takes oral metformin and basal insulin 30U qAM / 25U qPM  - Levemir ordered w/ Accu-Cheks and SSI  -9/8, glucose elevated over the last 24 hours.  Will increase Levemir to 20 units twice daily     Impaired renal function, data deficit   -Cr 1.46 was noted on presentation, improved.     DVT prophylaxis: SCDs    DVT prophylaxis:  Mechanical DVT prophylaxis orders are present.     AM-PAC 6 Clicks Score (PT): 24 (09/09/22 0811)    CODE STATUS:   Code Status and Medical Interventions:   Ordered at: 09/05/22 1624     Code Status (Patient has no pulse and is not breathing):    CPR (Attempt to Resuscitate)     Medical Interventions (Patient has pulse or is breathing):    Full Support     This patient's problems and plans were partially entered by my partner and updated as appropriate by me 09/09/22.     Trinh Deleon, STEPHANIE  09/09/22

## 2022-09-09 NOTE — CASE MANAGEMENT/SOCIAL WORK
Continued Stay Note  [facility]     Patient Name: Gwyn Fuchs  MRN: [id number]  Today's Date: 9/9/2022    Admit Date: 9/5/2022     Discharge Plan     Row Name 09/09/22 1506       Plan    Plan Home with HH    Patient/Family in Agreement with Plan other (see comments)    Plan Comments No plans to DC on the weekend. I spoke with the director at [facility]. They prefer to have pts return during the week if possible. If the pt needs HH, they like to use [facility]. I spoke with the pts daughter Florence. CM will f/u on Monday.    Final Discharge Disposition Code 06 - home with home health care               Discharge Codes    No documentation.               Expected Discharge Date and Time     Expected Discharge Date Expected Discharge Time    Sep 9, 2022             Hortensia Ji RN

## 2022-09-09 NOTE — PLAN OF CARE
Goal Outcome Evaluation:   Pt VSS throughout the night no new complaints at this time.

## 2022-09-09 NOTE — PROGRESS NOTES
" [facility] - Progress Note    Gwyn READ Delph  1950  [id number]    09/09/22, 07:46EDT      Chief Complaint: Following for LV dysfunction    Subjective:   Resting in bed, awakens easily.  RN at bedside.  Reports he slept well.  Breathing is good as long as he's at rest.      Review of Systems:  Pertinent positives are listed above and in physical exam.  All others have been reviewed and are negative.    atorvastatin, 10 mg, Oral, Nightly  furosemide, 40 mg, Oral, Daily  insulin detemir, 20 Units, Subcutaneous, Q12H  insulin lispro, 0-7 Units, Subcutaneous, TID AC  lactulose, 20 g, Oral, BID  levothyroxine, 75 mcg, Oral, Q AM  metoprolol succinate XL, 100 mg, Oral, Q24H  pantoprazole, 40 mg, Oral, Q AM  rifAXIMin, 550 mg, Oral, Q12H  sacubitril-valsartan, 1 tablet, Oral, Q12H  senna-docusate sodium, 2 tablet, Oral, BID  sodium chloride, 10 mL, Intravenous, Q12H  spironolactone, 100 mg, Oral, Daily  venlafaxine XR, 150 mg, Oral, Daily With Breakfast        Objective:  Vitals:   height is 180.3 cm (71\") and weight is 75.8 kg (167 lb). His oral temperature is 98 °F (36.7 °C). His blood pressure is 147/90 and his pulse is 92. His respiration is 16 and oxygen saturation is 96%.       Intake/Output Summary (Last 24 hours) at 9/9/2022 0745  Last data filed at 9/8/2022 0824  Gross per 24 hour   Intake 325 ml   Output --   Net 325 ml       Physical Exam:  General:  WN, NAD, A and O x3.  CV:  Normal S1,S2.  . No murmur, rub, or gallop.  Resp:  CTA Fransico, equal, nonlabored.  Abd:  Soft, + BS, no organomegaly. Nontender to palpation.  Extrem:  No edema BLE, 2+ pedal/PT pulses.            Results from last 7 days   Lab Units 09/09/22  0425   WBC 10*3/mm3 6.92   HEMOGLOBIN g/dL 11.3*   HEMATOCRIT % 34.5*   PLATELETS 10*3/mm3 112*     Results from last 7 days   Lab Units 09/09/22  0425 09/06/22  0258   SODIUM mmol/L 129* 134*   POTASSIUM mmol/L 3.7 3.7   CHLORIDE mmol/L 94* 100   CO2 mmol/L 23.0 23.0   BUN mg/dL 18 13 "   CREATININE mg/dL 1.32* 1.26   CALCIUM mg/dL 8.4* 8.7   BILIRUBIN mg/dL  --  0.3   ALK PHOS U/L  --  151*   ALT (SGPT) U/L  --  67*   AST (SGOT) U/L  --  108*   GLUCOSE mg/dL 323* 104*     Results from last 7 days   Lab Units 09/05/22  1230   INR  1.05             Results from last 7 days   Lab Units 09/09/22  0425   PROBNP pg/mL 153.4       Tele:  ST    ASSESSMENT:  -  New Onset LV dysfunction, EF 30-35%  -  Severe MR by TTE  -  Recurrent Right Pleural Effusion  -  CAD  -  Essential Hypertension  -  LEZAMA Cirrhosis        PLAN:  -  73 yo CM with PMHx of CAD (stent 2008, patent stent 2015) with normal EF by cath at that time.  Presents with cough, orthopnea, SOA in setting of recurrent pleural effusion and LEZAMA cirrhosis.  ProBNP is WNL.  He is in NSR.  Echo shows severe MR, EF 30-35%.  LV Dysfunction by echo is new but overall, patient appears to be asymptomatic.  Repeat ProBNP is 153  -  Patient is currently asymptomatic; would not pursue ischemic evaluation at this time with multiple co morbidities.  -  Will consider proceeding with DALE - This could be done next Wednesday (Dr. Osorio) if patient is still here.  If discharged prior, will arrange for outpatient DALE.  -  BP/HR not currently controlled.  Increase Toprol XL to 100 mg daily. Add Entresto 24-26mg BID.  Lasix and Aldactone have been added by GI for effusions and hepatic hydrothorax.  -  Monitor I/Os, daily wts, nutrition.  -  Repeat CXR today confirms increased fluid/atelectasis in Right lung. Discussed with Hospitalist.   -  Ambulate    Stable from cardiology standpoint.  We will see again on Monday - please call covering service if needed over weekend.        I discussed the patient's findings and my recommendations with the patient, any present family members, and the nursing staff.  Chuy Osorio MD saw and examined patient, verified hx and PE, read all radiographic studies, reviewed labs and micro data, and formulated dx, plan for treatment  and all medical decision making.      Milena Fleming PA-C  09/09/22, 07:46 EDT

## 2022-09-09 NOTE — PLAN OF CARE
Problem: Adult Inpatient Plan of Care  Goal: Plan of Care Review  Outcome: Ongoing, Progressing  Flowsheets (Taken 9/7/2022 0449 by Noa Parkinson, RN)  Progress: improving  Plan of Care Reviewed With: patient  Outcome Evaluation: Patient is a pleasant 72 yr old male.  Vital signs are stable. Patient educated on medications. Patient also educated on handwashing. Patient's family was at bedside until visitation ended. Will continue to monitor.  Goal: Patient-Specific Goal (Individualized)  Outcome: Ongoing, Progressing  Goal: Absence of Hospital-Acquired Illness or Injury  Outcome: Ongoing, Progressing  Intervention: Identify and Manage Fall Risk  Description: Perform standard risk assessment on admission using a validated tool or comprehensive approach appropriate to the patient; reassess fall risk frequently, with change in status or transfer to another level of care.  Communicate fall injury risk to interprofessional healthcare team.  Determine need for increased observation, equipment and environmental modification, such as low bed, signage and supportive, nonskid footwear.  Adjust safety measures to individual developmental age, stage and identified risk factors.  Reinforce the importance of safety and physical activity with patient and family.  Perform regular intentional rounding to assess need for position change, pain assessment and personal needs, including assistance with toileting.  Flowsheets  Taken 9/9/2022 1700 by Joshua Guerra, PCT  Safety Promotion/Fall Prevention:   activity supervised   assistive device/personal items within reach   clutter free environment maintained   fall prevention program maintained   gait belt   lighting adjusted   nonskid shoes/slippers when out of bed   room organization consistent   safety round/check completed  Taken 9/9/2022 1646 by Keara Lindsey, RN  Safety Promotion/Fall Prevention:   activity supervised   assistive device/personal items within reach   clutter  free environment maintained   nonskid shoes/slippers when out of bed   room organization consistent   safety round/check completed   fall prevention program maintained  Taken 9/9/2022 1400 by Keara Lindsey RN  Safety Promotion/Fall Prevention:   activity supervised   assistive device/personal items within reach   clutter free environment maintained   fall prevention program maintained   nonskid shoes/slippers when out of bed   room organization consistent   safety round/check completed   toileting scheduled  Taken 9/9/2022 1201 by Keara Lindsey RN  Safety Promotion/Fall Prevention: assistive device/personal items within reach  Taken 9/9/2022 1015 by Keara Lindsey RN  Safety Promotion/Fall Prevention:   activity supervised   assistive device/personal items within reach   clutter free environment maintained   room organization consistent   safety round/check completed   toileting scheduled   fall prevention program maintained   muscle strengthening facilitated  Taken 9/9/2022 0811 by Keara Lindsey RN  Safety Promotion/Fall Prevention:   activity supervised   assistive device/personal items within reach   clutter free environment maintained   fall prevention program maintained   muscle strengthening facilitated   room organization consistent   toileting scheduled  Intervention: Prevent Skin Injury  Description: Perform a screening for skin injury risk, such as pressure or moisture associated skin damage on admission and at regular intervals throughout hospital stay.  Keep all areas of skin (especially folds) clean and dry.  Maintain adequate skin hydration.  Relieve and redistribute pressure and protect bony prominences; implement measures based on patient-specific risk factors.  Match turning and repositioning schedule to clinical condition.  Encourage weight shift frequently; assist with reposition if unable to complete independently.  Float heels off bed; avoid pressure on the Achilles tendon.  Keep skin  free from extended contact with medical devices.  Encourage functional activity and mobility, as early as tolerated.  Use aids (e.g., slide boards, mechanical lift) during transfer.  Flowsheets  Taken 9/9/2022 1700 by Joshua Guerra PCT  Body Position: position changed independently  Taken 9/9/2022 1400 by Keara Lindsey RN  Body Position: position changed independently  Taken 9/9/2022 1201 by Keara Lindsey RN  Body Position: position changed independently  Taken 9/9/2022 1015 by Keara Lindsey RN  Body Position: position changed independently  Taken 9/9/2022 0811 by Keara Lindsey RN  Body Position: position changed independently  Taken 9/8/2022 0824 by Keara Lindsey RN  Skin Protection:   other (see comments)   transparent dressing maintained   adhesive use limited  Intervention: Prevent and Manage VTE (Venous Thromboembolism) Risk  Description: Assess for VTE (venous thromboembolism) risk.  Encourage and assist with early ambulation.  Initiate and maintain compression or other therapy, as indicated, based on identified risk in accordance with organizational protocol and provider order.  Encourage both active and passive leg exercises while in bed, if unable to ambulate.  Flowsheets  Taken 9/9/2022 1700 by Joshua Guerra PCT  Activity Management: activity adjusted per tolerance  Taken 9/9/2022 1400 by Keara Lindsey RN  Activity Management: activity adjusted per tolerance  Taken 9/9/2022 1201 by Keara Lindsey RN  Activity Management: activity adjusted per tolerance  Taken 9/9/2022 0811 by Keara Lindsey RN  Activity Management:   activity adjusted per tolerance   up ad perla  Range of Motion: active ROM (range of motion) encouraged  Intervention: Prevent Infection  Description: Maintain skin and mucous membrane integrity; promote hand, oral and pulmonary hygiene.  Optimize fluid balance, nutrition, sleep and glycemic control to maximize infection resistance.  Identify potential sources of infection  early to prevent or mitigate progression of infection (e.g., wound, lines, devices).  Evaluate ongoing need for invasive devices; remove promptly when no longer indicated.  Flowsheets  Taken 9/9/2022 1646  Infection Prevention: hand hygiene promoted  Taken 9/9/2022 1400  Infection Prevention: personal protective equipment utilized  Taken 9/9/2022 1201  Infection Prevention: equipment surfaces disinfected  Taken 9/9/2022 1015  Infection Prevention: hand hygiene promoted  Taken 9/9/2022 0811  Infection Prevention: hand hygiene promoted  Goal: Optimal Comfort and Wellbeing  Outcome: Ongoing, Progressing  Intervention: Monitor Pain and Promote Comfort  Description: Assess pain level, treatment efficacy and patient response at regular intervals using a consistent pain scale.  Consider the presence and impact of preexisting chronic pain.  Encourage patient and caregiver involvement in pain assessment, interventions and safety measures.  Flowsheets (Taken 9/8/2022 1750)  Pain Management Interventions: care clustered  Intervention: Provide Person-Centered Care  Description: Use a family-focused approach to care.  Develop trust and rapport by proactively providing information, encouraging questions, addressing concerns and offering reassurance.  Acknowledge emotional response to hospitalization.  Recognize and utilize personal coping strategies.  Honor spiritual and cultural preferences.  Flowsheets (Taken 9/8/2022 7937)  Trust Relationship/Rapport: thoughts/feelings acknowledged  Goal: Readiness for Transition of Care  Outcome: Ongoing, Progressing  Intervention: Mutually Develop Transition Plan  Description: Identify available resources for support (e.g., family, friends, community).  Identify and address barriers to ongoing treatment and home management (e.g., environmental, financial).  Provide opportunities to practice self-management skills.  Assess and monitor emotional readiness for transition.  Establish or  reconnect linkage with outpatient providers or community-based services.  Flowsheets  Taken 9/6/2022 1024 by Hortensia Ji RN  Equipment Needed After Discharge: none  Discharge Coordination/Progress: He uses no DME at the facility. The staff assists with med administration. Meals and house keeping are provided. Staff assists with personall care as needed.  Equipment Currently Used at Home: none  Taken 9/5/2022 1708 by Kristie Hayden RN  Transportation Anticipated: family or friend will provide  Patient/Family Anticipated Services at Transition: none  Patient/Family Anticipates Transition to: (Assisted Living Facility) other (see comments)     Problem: Chest Pain  Goal: Resolution of Chest Pain Symptoms  Outcome: Ongoing, Progressing     Problem: Fall Injury Risk  Goal: Absence of Fall and Fall-Related Injury  Outcome: Ongoing, Progressing  Intervention: Identify and Manage Contributors  Description: Develop a fall prevention plan with the patient and caregiver/family.  Provide reorientation, appropriate sensory stimulation and routines with changes in mental status to decrease risk of fall.  Promote use of personal vision and auditory aids.  Assess assistance level required for safe and effective self-care; provide support as needed, such as toileting, mobilization. For age 65 and older, implement timed toileting with assistance.  Encourage physical activity, such as performance of mobility and self-care at highest level of patient ability, multicomponent exercise program and provision of appropriate assistive devices.  If fall occurs, assess the severity of injury; implement fall injury protocol. Determine the cause and revise fall injury prevention plan.  Regularly review medication contribution to fall risk; adjust medication administration times to minimize risk of falling.  Consider risk related to polypharmacy and age.  Balance adequate pain management with potential for oversedation.  Flowsheets  Taken  9/9/2022 1646 by Keara Lindsey RN  Medication Review/Management: medications reviewed  Taken 9/9/2022 1400 by Keara Lindsey RN  Medication Review/Management: medications reviewed  Taken 9/9/2022 1201 by Keara Lindsey RN  Medication Review/Management: medications reviewed  Taken 9/9/2022 1015 by Keara Lindsey RN  Medication Review/Management: medications reviewed  Taken 9/7/2022 1800 by Yazmin Johnson RN  Self-Care Promotion: independence encouraged  Intervention: Promote Injury-Free Environment  Description: Provide a safe, barrier-free environment that encourages independent activity.  Keep care area uncluttered and well-lighted.  Determine need for increased observation or monitoring.  Avoid use of devices that minimize mobility, such as restraints or indwelling urinary catheter.  Flowsheets  Taken 9/9/2022 1700 by Joshua Guerra PCT  Safety Promotion/Fall Prevention:   activity supervised   assistive device/personal items within reach   clutter free environment maintained   fall prevention program maintained   gait belt   lighting adjusted   nonskid shoes/slippers when out of bed   room organization consistent   safety round/check completed  Taken 9/9/2022 1646 by Keara Lindsey RN  Safety Promotion/Fall Prevention:   activity supervised   assistive device/personal items within reach   clutter free environment maintained   nonskid shoes/slippers when out of bed   room organization consistent   safety round/check completed   fall prevention program maintained  Taken 9/9/2022 1400 by Keara Lindsey RN  Safety Promotion/Fall Prevention:   activity supervised   assistive device/personal items within reach   clutter free environment maintained   fall prevention program maintained   nonskid shoes/slippers when out of bed   room organization consistent   safety round/check completed   toileting scheduled  Taken 9/9/2022 1201 by Keara Lindsey, RN  Safety Promotion/Fall Prevention: assistive device/personal  items within reach  Taken 9/9/2022 1015 by Keara Lindsey, RN  Safety Promotion/Fall Prevention:   activity supervised   assistive device/personal items within reach   clutter free environment maintained   room organization consistent   safety round/check completed   toileting scheduled   fall prevention program maintained   muscle strengthening facilitated  Taken 9/9/2022 0811 by Keara Lindsey, RN  Safety Promotion/Fall Prevention:   activity supervised   assistive device/personal items within reach   clutter free environment maintained   fall prevention program maintained   muscle strengthening facilitated   room organization consistent   toileting scheduled   Goal Outcome Evaluation:      Pt hyperglycemic this shift. Spoke with pt and family about home insulin regime and contacted the APRN who made changes. VSS pt not oriented to situation this  shift but oriented to self/time/ and place.

## 2022-09-10 LAB
ANION GAP SERPL CALCULATED.3IONS-SCNC: 9 MMOL/L (ref 5–15)
BASOPHILS # BLD AUTO: 0.02 10*3/MM3 (ref 0–0.2)
BASOPHILS NFR BLD AUTO: 0.4 % (ref 0–1.5)
BUN SERPL-MCNC: 18 MG/DL (ref 8–23)
BUN/CREAT SERPL: 16.2 (ref 7–25)
CALCIUM SPEC-SCNC: 8.4 MG/DL (ref 8.6–10.5)
CHLORIDE SERPL-SCNC: 101 MMOL/L (ref 98–107)
CO2 SERPL-SCNC: 25 MMOL/L (ref 22–29)
CREAT SERPL-MCNC: 1.11 MG/DL (ref 0.76–1.27)
DEPRECATED RDW RBC AUTO: 49.7 FL (ref 37–54)
EGFRCR SERPLBLD CKD-EPI 2021: 70.6 ML/MIN/1.73
EOSINOPHIL # BLD AUTO: 0.25 10*3/MM3 (ref 0–0.4)
EOSINOPHIL NFR BLD AUTO: 4.6 % (ref 0.3–6.2)
ERYTHROCYTE [DISTWIDTH] IN BLOOD BY AUTOMATED COUNT: 16.1 % (ref 12.3–15.4)
GLUCOSE BLDC GLUCOMTR-MCNC: 249 MG/DL (ref 70–130)
GLUCOSE BLDC GLUCOMTR-MCNC: 340 MG/DL (ref 70–130)
GLUCOSE BLDC GLUCOMTR-MCNC: 380 MG/DL (ref 70–130)
GLUCOSE SERPL-MCNC: 333 MG/DL (ref 65–99)
HCT VFR BLD AUTO: 33.3 % (ref 37.5–51)
HGB BLD-MCNC: 10.9 G/DL (ref 13–17.7)
IMM GRANULOCYTES # BLD AUTO: 0.03 10*3/MM3 (ref 0–0.05)
IMM GRANULOCYTES NFR BLD AUTO: 0.5 % (ref 0–0.5)
LYMPHOCYTES # BLD AUTO: 0.74 10*3/MM3 (ref 0.7–3.1)
LYMPHOCYTES NFR BLD AUTO: 13.5 % (ref 19.6–45.3)
MCH RBC QN AUTO: 27.7 PG (ref 26.6–33)
MCHC RBC AUTO-ENTMCNC: 32.7 G/DL (ref 31.5–35.7)
MCV RBC AUTO: 84.5 FL (ref 79–97)
MONOCYTES # BLD AUTO: 0.73 10*3/MM3 (ref 0.1–0.9)
MONOCYTES NFR BLD AUTO: 13.3 % (ref 5–12)
NEUTROPHILS NFR BLD AUTO: 3.7 10*3/MM3 (ref 1.7–7)
NEUTROPHILS NFR BLD AUTO: 67.7 % (ref 42.7–76)
NRBC BLD AUTO-RTO: 0 /100 WBC (ref 0–0.2)
PLATELET # BLD AUTO: 99 10*3/MM3 (ref 140–450)
PMV BLD AUTO: 12.3 FL (ref 6–12)
POTASSIUM SERPL-SCNC: 4 MMOL/L (ref 3.5–5.2)
RBC # BLD AUTO: 3.94 10*6/MM3 (ref 4.14–5.8)
SODIUM SERPL-SCNC: 135 MMOL/L (ref 136–145)
WBC NRBC COR # BLD: 5.47 10*3/MM3 (ref 3.4–10.8)

## 2022-09-10 PROCEDURE — 99232 SBSQ HOSP IP/OBS MODERATE 35: CPT | Performed by: NURSE PRACTITIONER

## 2022-09-10 PROCEDURE — 63710000001 INSULIN DETEMIR PER 5 UNITS: Performed by: NURSE PRACTITIONER

## 2022-09-10 PROCEDURE — 63710000001 INSULIN LISPRO (HUMAN) PER 5 UNITS: Performed by: INTERNAL MEDICINE

## 2022-09-10 PROCEDURE — 97164 PT RE-EVAL EST PLAN CARE: CPT

## 2022-09-10 PROCEDURE — 97530 THERAPEUTIC ACTIVITIES: CPT

## 2022-09-10 PROCEDURE — 80048 BASIC METABOLIC PNL TOTAL CA: CPT | Performed by: NURSE PRACTITIONER

## 2022-09-10 PROCEDURE — 82962 GLUCOSE BLOOD TEST: CPT

## 2022-09-10 PROCEDURE — 85025 COMPLETE CBC W/AUTO DIFF WBC: CPT | Performed by: NURSE PRACTITIONER

## 2022-09-10 RX ADMIN — VENLAFAXINE HYDROCHLORIDE 150 MG: 75 CAPSULE, EXTENDED RELEASE ORAL at 08:16

## 2022-09-10 RX ADMIN — INSULIN LISPRO 4 UNITS: 100 INJECTION, SOLUTION INTRAVENOUS; SUBCUTANEOUS at 16:49

## 2022-09-10 RX ADMIN — INSULIN LISPRO 6 UNITS: 100 INJECTION, SOLUTION INTRAVENOUS; SUBCUTANEOUS at 11:41

## 2022-09-10 RX ADMIN — Medication 10 ML: at 08:22

## 2022-09-10 RX ADMIN — INSULIN LISPRO 5 UNITS: 100 INJECTION, SOLUTION INTRAVENOUS; SUBCUTANEOUS at 08:16

## 2022-09-10 RX ADMIN — Medication 10 ML: at 20:17

## 2022-09-10 RX ADMIN — FUROSEMIDE 40 MG: 40 TABLET ORAL at 08:16

## 2022-09-10 RX ADMIN — METOPROLOL SUCCINATE 100 MG: 50 TABLET, EXTENDED RELEASE ORAL at 08:16

## 2022-09-10 RX ADMIN — RIFAXIMIN 550 MG: 550 TABLET ORAL at 08:16

## 2022-09-10 RX ADMIN — SACUBITRIL AND VALSARTAN 1 TABLET: 24; 26 TABLET, FILM COATED ORAL at 08:16

## 2022-09-10 RX ADMIN — SACUBITRIL AND VALSARTAN 1 TABLET: 24; 26 TABLET, FILM COATED ORAL at 20:17

## 2022-09-10 RX ADMIN — INSULIN DETEMIR 30 UNITS: 100 INJECTION, SOLUTION SUBCUTANEOUS at 08:17

## 2022-09-10 RX ADMIN — ATORVASTATIN CALCIUM 10 MG: 10 TABLET, FILM COATED ORAL at 20:17

## 2022-09-10 RX ADMIN — LEVOTHYROXINE SODIUM 75 MCG: 0.07 TABLET ORAL at 06:10

## 2022-09-10 RX ADMIN — RIFAXIMIN 550 MG: 550 TABLET ORAL at 20:17

## 2022-09-10 RX ADMIN — SENNOSIDES AND DOCUSATE SODIUM 2 TABLET: 50; 8.6 TABLET ORAL at 08:16

## 2022-09-10 RX ADMIN — SENNOSIDES AND DOCUSATE SODIUM 2 TABLET: 50; 8.6 TABLET ORAL at 20:17

## 2022-09-10 RX ADMIN — LACTULOSE 20 G: 10 SOLUTION ORAL at 11:14

## 2022-09-10 RX ADMIN — SPIRONOLACTONE 100 MG: 25 TABLET ORAL at 08:16

## 2022-09-10 RX ADMIN — PANTOPRAZOLE SODIUM 40 MG: 40 TABLET, DELAYED RELEASE ORAL at 06:10

## 2022-09-10 RX ADMIN — LACTULOSE 20 G: 10 SOLUTION ORAL at 20:17

## 2022-09-10 RX ADMIN — INSULIN DETEMIR 30 UNITS: 100 INJECTION, SOLUTION SUBCUTANEOUS at 20:18

## 2022-09-10 NOTE — PLAN OF CARE
Problem: Adult Inpatient Plan of Care  Goal: Plan of Care Review  Outcome: Ongoing, Progressing  Flowsheets  Taken 9/10/2022 1019 by Radha Dorantes  Plan of Care Reviewed With: patient  Outcome Evaluation: Physical therapy re-evaluation complete. The patient independent with transfers and ambulation. Patient ambulated 200 feet without AD and no loss of balance. Patient denies dyspnea during ambulation. Currently no acute PT needs identified. At hospital discharge recommend patient return to [facility].  Taken 9/7/2022 0449 by Noa Parkinson RN  Progress: improving  Goal: Patient-Specific Goal (Individualized)  Outcome: Ongoing, Progressing  Goal: Absence of Hospital-Acquired Illness or Injury  Outcome: Ongoing, Progressing  Intervention: Identify and Manage Fall Risk  Description: Perform standard risk assessment on admission using a validated tool or comprehensive approach appropriate to the patient; reassess fall risk frequently, with change in status or transfer to another level of care.  Communicate fall injury risk to interprofessional healthcare team.  Determine need for increased observation, equipment and environmental modification, such as low bed, signage and supportive, nonskid footwear.  Adjust safety measures to individual developmental age, stage and identified risk factors.  Reinforce the importance of safety and physical activity with patient and family.  Perform regular intentional rounding to assess need for position change, pain assessment and personal needs, including assistance with toileting.  Flowsheets  Taken 9/10/2022 1649  Safety Promotion/Fall Prevention:   activity supervised   assistive device/personal items within reach   fall prevention program maintained   nonskid shoes/slippers when out of bed   room organization consistent   safety round/check completed   toileting scheduled  Taken 9/10/2022 1401  Safety Promotion/Fall Prevention:   activity supervised   assistive device/personal items  within reach   clutter free environment maintained  Taken 9/10/2022 1200  Safety Promotion/Fall Prevention:   activity supervised   assistive device/personal items within reach   clutter free environment maintained   nonskid shoes/slippers when out of bed   room organization consistent   safety round/check completed   toileting scheduled   fall prevention program maintained  Taken 9/10/2022 1044  Safety Promotion/Fall Prevention:   activity supervised   assistive device/personal items within reach   clutter free environment maintained   fall prevention program maintained   room organization consistent   safety round/check completed   toileting scheduled   muscle strengthening facilitated   nonskid shoes/slippers when out of bed  Taken 9/10/2022 0816  Safety Promotion/Fall Prevention:   activity supervised   assistive device/personal items within reach   clutter free environment maintained   fall prevention program maintained   nonskid shoes/slippers when out of bed   room organization consistent   safety round/check completed   toileting scheduled  Intervention: Prevent Skin Injury  Description: Perform a screening for skin injury risk, such as pressure or moisture associated skin damage on admission and at regular intervals throughout hospital stay.  Keep all areas of skin (especially folds) clean and dry.  Maintain adequate skin hydration.  Relieve and redistribute pressure and protect bony prominences; implement measures based on patient-specific risk factors.  Match turning and repositioning schedule to clinical condition.  Encourage weight shift frequently; assist with reposition if unable to complete independently.  Float heels off bed; avoid pressure on the Achilles tendon.  Keep skin free from extended contact with medical devices.  Encourage functional activity and mobility, as early as tolerated.  Use aids (e.g., slide boards, mechanical lift) during transfer.  Flowsheets  Taken 9/10/2022 1649  Body  Position: position changed independently  Taken 9/10/2022 1401  Body Position: position changed independently  Taken 9/10/2022 1200  Body Position: position changed independently  Taken 9/10/2022 1044  Body Position: position changed independently  Taken 9/10/2022 0816  Body Position: position changed independently  Taken 9/8/2022 0824  Skin Protection:   other (see comments)   transparent dressing maintained   adhesive use limited  Intervention: Prevent and Manage VTE (Venous Thromboembolism) Risk  Description: Assess for VTE (venous thromboembolism) risk.  Encourage and assist with early ambulation.  Initiate and maintain compression or other therapy, as indicated, based on identified risk in accordance with organizational protocol and provider order.  Encourage both active and passive leg exercises while in bed, if unable to ambulate.  Flowsheets  Taken 9/10/2022 1500 by Bessie Greer PCT  Activity Management: activity adjusted per tolerance  Taken 9/10/2022 0816 by Keara Lindsey, RN  Activity Management:   activity adjusted per tolerance   dorsiflexion/plantar flexion performed  Range of Motion: active ROM (range of motion) encouraged  Intervention: Prevent Infection  Description: Maintain skin and mucous membrane integrity; promote hand, oral and pulmonary hygiene.  Optimize fluid balance, nutrition, sleep and glycemic control to maximize infection resistance.  Identify potential sources of infection early to prevent or mitigate progression of infection (e.g., wound, lines, devices).  Evaluate ongoing need for invasive devices; remove promptly when no longer indicated.  Flowsheets  Taken 9/10/2022 1649  Infection Prevention: hand hygiene promoted  Taken 9/10/2022 1401  Infection Prevention: hand hygiene promoted  Taken 9/10/2022 1200  Infection Prevention: environmental surveillance performed  Taken 9/10/2022 1044  Infection Prevention: environmental surveillance performed  Taken 9/10/2022 0816  Infection  Prevention:   environmental surveillance performed   hand hygiene promoted   rest/sleep promoted  Goal: Optimal Comfort and Wellbeing  Outcome: Ongoing, Progressing  Intervention: Monitor Pain and Promote Comfort  Description: Assess pain level, treatment efficacy and patient response at regular intervals using a consistent pain scale.  Consider the presence and impact of preexisting chronic pain.  Encourage patient and caregiver involvement in pain assessment, interventions and safety measures.  Flowsheets (Taken 9/8/2022 1750)  Pain Management Interventions: care clustered  Intervention: Provide Person-Centered Care  Description: Use a family-focused approach to care.  Develop trust and rapport by proactively providing information, encouraging questions, addressing concerns and offering reassurance.  Acknowledge emotional response to hospitalization.  Recognize and utilize personal coping strategies.  Honor spiritual and cultural preferences.  Flowsheets  Taken 9/10/2022 1649  Trust Relationship/Rapport:   care explained   thoughts/feelings acknowledged  Taken 9/10/2022 1401  Trust Relationship/Rapport: thoughts/feelings acknowledged  Goal: Readiness for Transition of Care  Outcome: Ongoing, Progressing  Intervention: Mutually Develop Transition Plan  Description: Identify available resources for support (e.g., family, friends, community).  Identify and address barriers to ongoing treatment and home management (e.g., environmental, financial).  Provide opportunities to practice self-management skills.  Assess and monitor emotional readiness for transition.  Establish or reconnect linkage with outpatient providers or community-based services.  Flowsheets  Taken 9/6/2022 1024 by Hortensia Ji RN  Equipment Needed After Discharge: none  Discharge Coordination/Progress: He uses no DME at the facility. The staff assists with med administration. Meals and house keeping are provided. Staff assists with personall care  as needed.  Equipment Currently Used at Home: none  Taken 9/5/2022 1708 by Kristie Hayden RN  Transportation Anticipated: family or friend will provide  Patient/Family Anticipated Services at Transition: none  Patient/Family Anticipates Transition to: (Assisted Living Facility) other (see comments)     Problem: Chest Pain  Goal: Resolution of Chest Pain Symptoms  Outcome: Ongoing, Progressing     Problem: Fall Injury Risk  Goal: Absence of Fall and Fall-Related Injury  Outcome: Ongoing, Progressing  Intervention: Identify and Manage Contributors  Description: Develop a fall prevention plan with the patient and caregiver/family.  Provide reorientation, appropriate sensory stimulation and routines with changes in mental status to decrease risk of fall.  Promote use of personal vision and auditory aids.  Assess assistance level required for safe and effective self-care; provide support as needed, such as toileting, mobilization. For age 65 and older, implement timed toileting with assistance.  Encourage physical activity, such as performance of mobility and self-care at highest level of patient ability, multicomponent exercise program and provision of appropriate assistive devices.  If fall occurs, assess the severity of injury; implement fall injury protocol. Determine the cause and revise fall injury prevention plan.  Regularly review medication contribution to fall risk; adjust medication administration times to minimize risk of falling.  Consider risk related to polypharmacy and age.  Balance adequate pain management with potential for oversedation.  Flowsheets  Taken 9/10/2022 1649 by Keara Lindsey, RN  Medication Review/Management: medications reviewed  Taken 9/10/2022 1401 by Keara Lindsey, RN  Medication Review/Management: medications reviewed  Taken 9/10/2022 1200 by Keara Lindsey, RN  Medication Review/Management: medications reviewed  Taken 9/10/2022 1044 by Keara Lindsey, RN  Medication  Review/Management: medications reviewed  Taken 9/10/2022 0816 by Keara Lindsey, RN  Medication Review/Management: medications reviewed  Taken 9/7/2022 1800 by Yazmin Johnson, RN  Self-Care Promotion: independence encouraged  Intervention: Promote Injury-Free Environment  Description: Provide a safe, barrier-free environment that encourages independent activity.  Keep care area uncluttered and well-lighted.  Determine need for increased observation or monitoring.  Avoid use of devices that minimize mobility, such as restraints or indwelling urinary catheter.  Flowsheets  Taken 9/10/2022 1649  Safety Promotion/Fall Prevention:   activity supervised   assistive device/personal items within reach   fall prevention program maintained   nonskid shoes/slippers when out of bed   room organization consistent   safety round/check completed   toileting scheduled  Taken 9/10/2022 1401  Safety Promotion/Fall Prevention:   activity supervised   assistive device/personal items within reach   clutter free environment maintained  Taken 9/10/2022 1200  Safety Promotion/Fall Prevention:   activity supervised   assistive device/personal items within reach   clutter free environment maintained   nonskid shoes/slippers when out of bed   room organization consistent   safety round/check completed   toileting scheduled   fall prevention program maintained  Taken 9/10/2022 1044  Safety Promotion/Fall Prevention:   activity supervised   assistive device/personal items within reach   clutter free environment maintained   fall prevention program maintained   room organization consistent   safety round/check completed   toileting scheduled   muscle strengthening facilitated   nonskid shoes/slippers when out of bed  Taken 9/10/2022 0816  Safety Promotion/Fall Prevention:   activity supervised   assistive device/personal items within reach   clutter free environment maintained   fall prevention program maintained   nonskid shoes/slippers when out  of bed   room organization consistent   safety round/check completed   toileting scheduled   Goal Outcome Evaluation:      Pt oriented x4 with periods of confusion. Pt still hyperglycemic, changes made to long acting insulin today. Pt did have 3 bowel movements this shift. Pt had a shower. Pt up currently visiting with family.

## 2022-09-10 NOTE — PLAN OF CARE
Goal Outcome Evaluation:  Plan of Care Reviewed With: patient           Outcome Evaluation: Physical therapy re-evaluation complete. The patient independent with transfers and ambulation. Patient ambulated 200 feet without AD and no loss of balance. Patient denies dyspnea during ambulation. Currently no acute PT needs identified. At hospital discharge recommend patient return to [facility].

## 2022-09-10 NOTE — PROGRESS NOTES
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  Notes he is feeling well today.  No asterixis on exam.  Has had 1 bowel movement today; estimates at least 3 yesterday.  Tolerating diet notes he is feeling very well.    Objective:    /83 (BP Location: Right arm, Patient Position: Lying)   Pulse 88   Temp 97.9 °F (36.6 °C) (Oral)   Resp 18   Ht 180.3 cm (71\")   Wt 75.8 kg (167 lb)   SpO2 95%   BMI 23.29 kg/m²     Physical Exam  Vitals and nursing note reviewed.   Constitutional:       Appearance: Normal appearance. He is normal weight.   HENT:      Head: Normocephalic and atraumatic.      Mouth/Throat:      Mouth: Mucous membranes are moist.      Pharynx: Oropharynx is clear. No oropharyngeal exudate.   Eyes:      General: No scleral icterus.     Extraocular Movements: Extraocular movements intact.      Conjunctiva/sclera: Conjunctivae normal.      Pupils: Pupils are equal, round, and reactive to light.   Cardiovascular:      Rate and Rhythm: Normal rate and regular rhythm.      Pulses: Normal pulses.      Heart sounds: Normal heart sounds.   Pulmonary:       Abdominal:      General: Abdomen is flat. Bowel sounds are normal. There is no distension.      Palpations: Abdomen is soft. There is no mass.      Tenderness: There is no abdominal tenderness. There is no guarding or rebound.      Hernia: No hernia is present.   Skin:     General: Skin is warm and dry.      Capillary Refill: Capillary refill takes less than 2 seconds.      Coloration: Skin is pale. Skin is not jaundiced.   Neurological:      Mental Status: He is alert and oriented to person, place, and time. Mental status is at baseline.      Comments: Mild asterixis noted on exam; improved from prior day.   Psychiatric:         Mood and Affect: Mood normal.         Behavior: Behavior normal.         Thought " Content: Thought content normal.         Judgment: Judgment normal.         Lab  I have personally reviewed most recent cardiac tracings, lab results and radiology images and interpretations and agree with findings.    Lab Results   Component Value Date    WBC 5.47 09/10/2022    HGB 10.9 (L) 09/10/2022    HGB 11.3 (L) 09/09/2022    HGB 10.2 (L) 09/06/2022    MCV 84.5 09/10/2022    PLT 99 (L) 09/10/2022    INR 1.05 09/05/2022       Lab Results   Component Value Date    GLUCOSE 333 (H) 09/10/2022    BUN 18 09/10/2022    CREATININE 1.11 09/10/2022    BCR 16.2 09/10/2022     (L) 09/10/2022    K 4.0 09/10/2022    CO2 25.0 09/10/2022    CALCIUM 8.4 (L) 09/10/2022    ALBUMIN 2.90 (L) 09/06/2022    ALKPHOS 151 (H) 09/06/2022    BILITOT 0.3 09/06/2022    ALT 67 (H) 09/06/2022     (H) 09/06/2022 09/09/22 01:14   XR CHEST 1 VW IMPRESSION:  Increasing pleural fluid and increasing atelectasis in the right lung.     Assessment:      Recurrent right pleural effusion    Other cirrhosis of liver (HCC)    Coronary artery disease involving native coronary artery of native heart without angina pectoris    HTN (hypertension)    Type 2 diabetes mellitus without complication, with long-term current use of insulin (HCC)    Impaired renal function    1. LEZAMA cirrhosis with hepatic encephalopathy.  MELD-Na is 14.   Child Quigley Class B.    2. 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 Diastolic heart failure,  EF is 30-35%  4. Severe mitral valve regurgitation     Plan:  Patient doing well today.  Asterixis improving and mental status more clear and back to baseline.  Breath sounds diminished on the right consistent with reaccumulated hydrothorax.   >>> Continue diuretics for hydrothorax; diuretics and nutritional therapy  >>> Continue PT/OT; continue to encourage nutrition with high-protein / low sodium (less than 2g/day) diet  >>> Will need repeat thoracentesis per IR.    Ortega Gentile,  STEPHANIE  09/10/22  12:44 EDT

## 2022-09-10 NOTE — PROGRESS NOTES
[facility] Medicine Services  PROGRESS NOTE    Patient Name: Gwyn Fuchs  : 1950  MRN: [id number]    Date of Admission: 2022  Primary Care Physician: Riley Mckoy DO    Subjective   Subjective     CC:  Recurrent pleural effusion    HPI:  Patient resting in bed. No soa, ambulating well. + BMs    ROS:  Gen- No fevers, chills  CV- No chest pain, palpitations  Resp- (+) occasional dry cough, no dyspnea  GI- No N/V/D, abd pain      Objective   Objective     Vital Signs:   Temp:  [97.3 °F (36.3 °C)-98 °F (36.7 °C)] 97.9 °F (36.6 °C)  Heart Rate:  [] 88  Resp:  [16-18] 18  BP: (141-157)/(76-85) 150/83     Physical Exam:  Constitutional: Awake, alert, NAD, Pleasant  HENT: NCAT, mucous membranes moist  Respiratory: Clear to auscultation bilaterally, nonlabored respirations on RA  Cardiovascular: RRR, no murmurs, rubs, or gallop HR 94  Gastrointestinal: Positive bowel sounds, soft, nontender, nondistended  Musculoskeletal: No bilateral ankle edema  Psychiatric: Appropriate affect, cooperative  Neurologic: Oriented x 3, strength symmetric in all extremities, Cranial Nerves grossly intact to confrontation, speech clear  Skin: No rashes      Results Reviewed:  LAB RESULTS:      Lab 09/10/22  0502 22  0425 22  0258 22  1230   WBC 5.47 6.92 3.49 5.02   HEMOGLOBIN 10.9* 11.3* 10.2* 9.8*   HEMATOCRIT 33.3* 34.5* 32.6* 30.3*   PLATELETS 99* 112* 82* 83*   NEUTROS ABS 3.70 4.71  --  3.15   IMMATURE GRANS (ABS) 0.03 0.05  --  0.03   LYMPHS ABS 0.74 0.92  --  0.77   MONOS ABS 0.73 0.89  --  0.76   EOS ABS 0.25 0.32  --  0.29   MCV 84.5 85.8 90.6 86.8   LDH  --   --  356*  --    PROTIME  --   --   --  13.6         Lab 09/10/22  0502 22  0425 22  0258 22  1230   SODIUM 135* 129* 134* 136   POTASSIUM 4.0 3.7 3.7 3.9   CHLORIDE 101 94* 100 101   CO2 25.0 23.0 23.0 22.0   ANION GAP 9.0 12.0 11.0 13.0   BUN 18 18 13 14   CREATININE 1.11 1.32* 1.26 1.46*    EGFR 70.6 57.3* 60.6 50.8*   GLUCOSE 333* 323* 104* 227*   CALCIUM 8.4* 8.4* 8.7 9.0   HEMOGLOBIN A1C  --   --   --  9.30*         Lab 09/06/22  0258 09/05/22  1230   TOTAL PROTEIN 7.4 7.7   ALBUMIN 2.90* 3.20*   GLOBULIN 4.5 4.5   ALT (SGPT) 67* 58*   AST (SGOT) 108* 109*   BILIRUBIN 0.3 0.2   ALK PHOS 151* 146*         Lab 09/09/22  0425 09/05/22  1230   PROBNP 153.4 332.2   TROPONIN T  --  0.012   PROTIME  --  13.6   INR  --  1.05                 Brief Urine Lab Results     None          Microbiology Results Abnormal     Procedure Component Value - Date/Time    Body Fluid Culture - Body Fluid, Pleural Cavity [[id number]] Collected: 09/06/22 1123    Lab Status: Final result Specimen: Body Fluid from Pleural Cavity Updated: 09/09/22 1012     Body Fluid Culture No growth at 3 days     Gram Stain Many (4+) WBCs seen      No organisms seen    Anaerobic Culture - Pleural Fluid, Pleural Cavity [[id number]]  (Normal) Collected: 09/06/22 1123    Lab Status: Preliminary result Specimen: Pleural Fluid from Pleural Cavity Updated: 09/09/22 0831     Anaerobic Culture No anaerobes isolated at 3 days    Fungus Smear - Body Fluid, Pleural Cavity [[id number]] Collected: 09/06/22 1123    Lab Status: Final result Specimen: Body Fluid from Pleural Cavity Updated: 09/07/22 1518     Fungal Stain No yeast or hyphal elements seen    AFB Culture - Body Fluid, Pleural Cavity [[id number]] Collected: 09/06/22 1123    Lab Status: Preliminary result Specimen: Body Fluid from Pleural Cavity Updated: 09/07/22 1159     AFB Stain No acid fast bacilli seen on concentrated smear    KOH Prep - Body Fluid, Pleural Cavity [[id number]] Collected: 09/06/22 1123    Lab Status: Final result Specimen: Body Fluid from Pleural Cavity Updated: 09/06/22 1250     KOH Prep No yeast or hyphal elements seen          XR Chest 1 View    Result Date: 9/9/2022  DATE OF EXAM: 9/9/2022 1:11 AM  PROCEDURE: XR CHEST 1 VW-  INDICATIONS: pleural effusion, cough; S03-Genaqyd  effusion, not elsewhere classified; R06.02-Shortness of breath  COMPARISON: 9/6/2022  TECHNIQUE: Single radiographic AP view of the chest was obtained.  FINDINGS: The heart size is normal and the left lung remains clear. There is an increase in density diffusely in the right hemithorax representing a combination of pleural fluid and atelectasis. The pulmonary vascular markings are normal.      Impression: Increasing pleural fluid and increasing atelectasis in the right lung.  This report was finalized on 9/9/2022 7:09 AM by Haroldo Arciniega MD.        Results for orders placed during the hospital encounter of 09/05/22    Adult Transthoracic Echo Complete W/ Cont if Necessary Per Protocol    Interpretation Summary  · Left ventricular wall thickness is consistent with mild concentric hypertrophy.  · Mildly reduced right ventricular systolic function noted.  · There is calcification of the aortic valve.  · Severe mitral valve regurgitation is present.  · Estimated right ventricular systolic pressure from tricuspid regurgitation is mildly elevated (35-45 mmHg). Calculated right ventricular systolic pressure from tricuspid regurgitation is 37 mmHg.  · There is a right pleural effusion.  · Global LV hypokinesis  · EF=30-35%      I have reviewed the medications:  Scheduled Meds:atorvastatin, 10 mg, Oral, Nightly  furosemide, 40 mg, Oral, Daily  insulin detemir, 30 Units, Subcutaneous, Q12H  insulin lispro, 0-7 Units, Subcutaneous, TID AC  lactulose, 20 g, Oral, BID  levothyroxine, 75 mcg, Oral, Q AM  metoprolol succinate XL, 100 mg, Oral, Q24H  pantoprazole, 40 mg, Oral, Q AM  rifAXIMin, 550 mg, Oral, Q12H  sacubitril-valsartan, 1 tablet, Oral, Q12H  senna-docusate sodium, 2 tablet, Oral, BID  sodium chloride, 10 mL, Intravenous, Q12H  spironolactone, 100 mg, Oral, Daily  venlafaxine XR, 150 mg, Oral, Daily With Breakfast      Continuous Infusions:   PRN Meds:.•  acetaminophen **OR** acetaminophen **OR** acetaminophen  •   senna-docusate sodium **AND** polyethylene glycol **AND** bisacodyl **AND** bisacodyl  •  dextrose  •  dextrose  •  glucagon (human recombinant)  •  ondansetron **OR** ondansetron  •  sodium chloride  •  sodium chloride    Assessment & Plan   Assessment & Plan     Active Hospital Problems    Diagnosis  POA   • **Recurrent right pleural effusion [J90]  Unknown   • Other cirrhosis of liver (HCC) [K74.69]  Yes   • Coronary artery disease involving native coronary artery of native heart without angina pectoris [I25.10]  Yes   • HTN (hypertension) [I10]  Yes   • Type 2 diabetes mellitus without complication, with long-term current use of insulin (HCC) [E11.9, Z79.4]  Not Applicable   • Impaired renal function [N28.9]  Yes      Resolved Hospital Problems   No resolved problems to display.     Hospital Course to date:   This is a 72-year-old male with Bhandari cirrhosis, CAD s/p prior MI and stenting, DM2 who was initially evaluated outpatient for progressive orthopnea/DHILLON and identified to have large pleural effusion, s/p x2 thoracentesis at OSH with unclear laboratory testing/results, presenting to our facility with recurrent symptoms and large pleural effusion    This patient's problems and plans were partially entered by my partner and updated as appropriate by me 09/10/22.        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  - S/p large-volume thoracentesis at OSH 8/18, 8/24; via [facility] pul on an outpatient basis; unclear labs/Dx  -Chest x-ray on admission shows large right-sided pleural effusion with vascular crowding and dependent atelectasis.  -IR thoracentesis performed with labs, cytology  -ECHO EF 30 to 35% with severe MR, LV dysfunction  -GI consulted recommending 2 g sodium diet, Lasix 40 mg and spironolactone 100 mg daily.  Did not recommend chest tube for the treatment of hepatic hydrothorax as this can lead to massive protein electrolyte depletion,  infection, renal failure and bleeding.      LEZAMA cirrhosis w/ anemia/thrombocytopenia  -Home Xifaxan, lactulose, PPI  -AST/ALT elevated and stable.  INR within normal limits.  -Noted to be on home lactulose, increased today to 20 mg bid for a BM goal of 2-3 per day  -GI consulted recommending 2 g sodium diet, Lasix 40 mg and spironolactone 100 mg daily.  Patient a poor candidate for TIPS because of CHF and moderate pulmonary hypertension and hepatic encephalopathy.      CAD s/p remote stenting  Hx MI  Hypertension  Hyperlipidemia  Echo as above EF30-35%  - increased toprol-XL 100mg, statin   -Previously followed by Dr. Osorio, last appointment 8/4/2015. Currently asymptomatic so deferring ischemic evaluation    HFrEF  - Echo shows severe MR, EF 30 to 35%, LV dysfunction.   - Patient asymptomatic. Defer ischemic eval  - metoprolol XL increased to 100 mg daily.   - Entresto 24-26 mg twice daily added.    - Lasix and Aldactone.    -Planning D[facility] Wednesday if still hospitalized or outpatient.     DM type 2, unknown A1c, with long-term use of insulin  -A1c 9.3  - Baseline takes oral metformin and basal insulin 30U qAM / 25U qPM  - Levemir ordered w/ Accu-Cheks and SSI  -9/8, glucose elevated over the last 24 hours.  Will increase Levemir to 20 units twice daily     Impaired renal function, data deficit   -Cr 1.1, improved     DVT prophylaxis: SCDs    DVT prophylaxis:  Mechanical DVT prophylaxis orders are present.     AM-PAC 6 Clicks Score (PT): 24 (09/10/22 1023)    CODE STATUS:   Code Status and Medical Interventions:   Ordered at: 09/05/22 162     Code Status (Patient has no pulse and is not breathing):    CPR (Attempt to Resuscitate)     Medical Interventions (Patient has pulse or is breathing):    Full Support         Nena Aguilar, APRN  09/10/22

## 2022-09-10 NOTE — THERAPY DISCHARGE NOTE
Patient Name: Gwyn Fuchs  : 1950    MRN: [id number]                              Today's Date: 9/10/2022       Admit Date: 2022    Visit Dx:     ICD-10-CM ICD-9-CM   1. Recurrent right pleural effusion  J90 511.9   2. Shortness of breath  R06.02 786.05     Patient Active Problem List   Diagnosis   • Other cirrhosis of liver (HCC)   • Coronary artery disease involving native coronary artery of native heart without angina pectoris   • Recurrent right pleural effusion   • HTN (hypertension)   • Type 2 diabetes mellitus without complication, with long-term current use of insulin (HCC)   • Impaired renal function     Past Medical History:   Diagnosis Date   • Coronary artery disease    • Diabetes mellitus (HCC)    • Hyperlipidemia    • Hypertension    • Myocardial infarction (HCC)      Past Surgical History:   Procedure Laterality Date   • KNEE SURGERY Right       General Information     Row Name 09/10/22 1014          Physical Therapy Time and Intention    Document Type re-evaluation;discharge evaluation/summary  -ML     Mode of Treatment physical therapy  -ML     Row Name 09/10/22 1014          General Information    Patient Profile Reviewed yes  -ML     Prior Level of Function independent:;all household mobility;community mobility;gait;transfer;ADL's  -ML     Existing Precautions/Restrictions fall  -ML     Row Name 09/10/22 1014          Living Environment    People in Home facility resident  -ML     Row Name 09/10/22 1014          Home Main Entrance    Number of Stairs, Main Entrance none  -ML     Stair Railings, Main Entrance none  -ML     Row Name 09/10/22 1014          Stairs Within Home, Primary    Number of Stairs, Within Home, Primary none  -ML     Row Name 09/10/22 1014          Cognition    Orientation Status (Cognition) oriented x 3  -ML     Row Name 09/10/22 1014          Safety Issues, Functional Mobility    Safety Issues Affecting Function (Mobility) safety precaution awareness;safety  precautions follow-through/compliance  -ML           User Key  (r) = Recorded By, (t) = Taken By, (c) = Cosigned By    Initials Name Provider Type    ML Radha Dorantes Physical Therapist               Mobility     Row Name 09/10/22 1016          Bed Mobility    Bed Mobility supine-sit-supine  -ML     Supine-Sit-Supine [place] (Bed Mobility) independent  -ML     Row Name 09/10/22 1016          Sit-Stand Transfer    Sit-Stand [place] (Transfers) independent  -ML     Assistive Device (Sit-Stand Transfers) other (see comments)  no AD  -ML     Row Name 09/10/22 1016          Gait/Stairs (Locomotion)    [place] Level (Gait) independent  -ML     Distance in Feet (Gait) 200  -ML     Deviations/Abnormal Patterns (Gait) hannah decreased  -ML     Comment, (Gait/Stairs) Patient ambulated without loss of balance, patient denies dyspnea during ambulation.  -ML           User Key  (r) = Recorded By, (t) = Taken By, (c) = Cosigned By    Initials Name Provider Type    ML Radha Dorantes Physical Therapist               Obj/Interventions     Row Name 09/10/22 1018          Range of Motion Comprehensive    General Range of Motion no range of motion deficits identified  -ML     [facility] Name 09/10/22 1018          Strength Comprehensive (MMT)    General Manual Muscle Testing (MMT) Assessment no strength deficits identified  -ML     [facility] Name 09/10/22 1018          Balance    Balance Assessment sitting static balance;sitting dynamic balance;sit to stand dynamic balance;standing static balance;standing dynamic balance  -ML     Static Sitting Balance independent  -ML     Dynamic Sitting Balance independent  -ML     Position, Sitting Balance unsupported  -ML     Sit to Stand Dynamic Balance independent  -ML     Static Standing Balance independent  -ML     Dynamic Standing Balance independent  -ML     Position/Device Used, Standing Balance unsupported  -ML     Balance Interventions sitting;standing;sit to  stand;supported;static;dynamic;minimal challenge;occupation based/functional task  -ML           User Key  (r) = Recorded By, (t) = Taken By, (c) = Cosigned By    Initials Name Provider Type    Radha Blancas Physical Therapist               Goals/Plan    No documentation.                Clinical Impression     Row Name 09/10/22 1019          Pain    Pretreatment Pain Rating 0/10 - no pain  -ML     Posttreatment Pain Rating 0/10 - no pain  -ML     [facility] Name 09/10/22 1019          Plan of Care Review    Plan of Care Reviewed With patient  -     Outcome Evaluation Physical therapy re-evaluation complete. The patient independent with transfers and ambulation. Patient ambulated 200 feet without AD and no loss of balance. Patient denies dyspnea during ambulation. Currently no acute PT needs identified. At hospital discharge recommend patient return to [facility].  -ML     [facility] Name 09/10/22 1019          Therapy Assessment/Plan (PT)    Patient/Family Therapy Goals Statement (PT) return to assisted living facility  -     Criteria for Skilled Interventions Met (PT) no;no problems identified which require skilled intervention  -     Therapy Frequency (PT) evaluation only  -ML     [facility] Name 09/10/22 1019          Vital Signs    Pre Patient Position Supine  -ML     Intra Patient Position Standing  -ML     Post Patient Position Supine  -ML     [facility] Name 09/10/22 1019          Positioning and Restraints    Pre-Treatment Position in bed  -ML     Post Treatment Position bed  -ML     In Bed notified nsg;supine;call light within reach;encouraged to call for assist;with other staff  -           User Key  (r) = Recorded By, (t) = Taken By, (c) = Cosigned By    Initials Name Provider Type    Radha Blancas Physical Therapist               Outcome Measures     Row Name 09/10/22 1023          How much help from another person do you currently need...    Turning from your back to your side while in flat bed without using bedrails? 4  -ML      Moving from lying on back to sitting on the side of a flat bed without bedrails? 4  -ML     Moving to and from a bed to a chair (including a wheelchair)? 4  -ML     Standing up from a chair using your arms (e.g., wheelchair, bedside chair)? 4  -ML     Climbing 3-5 steps with a railing? 4  -ML     To walk in hospital room? 4  -ML     AM-PAC 6 Clicks Score (PT) 24  -ML     Highest level of mobility 8 --> Walked 250 feet or more  -ML     Row Name 09/10/22 1023          Functional Assessment    Outcome Measure Options AM-PAC 6 Clicks Basic Mobility (PT)  -ML           User Key  (r) = Recorded By, (t) = Taken By, (c) = Cosigned By    Initials Name Provider Type    ML Radha Dorantes Physical Therapist                             Physical Therapy Education                 Title: PT OT SLP Therapies (Done)     Topic: Physical Therapy (Done)     Point: Mobility training (Done)     Learning Progress Summary           Patient Acceptance, E, VU by  at 9/10/2022 1024    Acceptance, E, VU by FW at 9/6/2022 0851                   Point: Home exercise program (Done)     Learning Progress Summary           Patient Acceptance, E, VU by ML at 9/10/2022 1024    Acceptance, E, VU by FW at 9/6/2022 0851                   Point: Body mechanics (Done)     Learning Progress Summary           Patient Acceptance, E, VU by ML at 9/10/2022 1024    Acceptance, E, VU by FW at 9/6/2022 0851                   Point: Precautions (Done)     Learning Progress Summary           Patient Acceptance, E, VU by  at 9/10/2022 1024    Acceptance, E, VU by FW at 9/6/2022 0851                               User Key     Initials Effective Dates Name Provider Type Discipline     05/05/22 -  Rolando Cabrera, PT Physical Therapist PT     04/22/21 -  Radha Doarntes Physical Therapist PT              PT Recommendation and Plan     Plan of Care Reviewed With: patient  Outcome Evaluation: Physical therapy re-evaluation complete. The patient independent with  transfers and ambulation. Patient ambulated 200 feet without AD and no loss of balance. Patient denies dyspnea during ambulation. Currently no acute PT needs identified. At hospital discharge recommend patient return [facility].     Time Calculation:    PT Charges     Row Name 09/10/22 1024             Time Calculation    Start Time 1000  -ML      PT Received On 09/10/22  -ML              Timed Charges    05051 - PT Therapeutic Activity Minutes 10  -ML              Untimed Charges    PT Eval/Re-eval Minutes 20  -ML              Total Minutes    Timed Charges Total Minutes 10  -ML      Untimed Charges Total Minutes 20  -ML       Total Minutes 30  -ML            User Key  (r) = Recorded By, (t) = Taken By, (c) = Cosigned By    Initials Name Provider Type     Radha Dorantes Physical Therapist              Therapy Charges for Today     Code Description Service Date Service Provider Modifiers Qty    [id number]  PT THERAPEUTIC ACT EA 15 MIN 9/10/2022 Radha Dorantes GP 1    [id number]  PT RE-EVAL ESTABLISHED PLAN 2 9/10/2022 Radha Dorantes GP 1          PT G-Codes  Outcome Measure Options: AM-PAC 6 Clicks Basic Mobility (PT)  AM-PAC 6 Clicks Score (PT): 24    Radha Dorantes  9/10/2022

## 2022-09-11 LAB
BACTERIA SPEC ANAEROBE CULT: NORMAL
GLUCOSE BLDC GLUCOMTR-MCNC: 252 MG/DL (ref 70–130)
GLUCOSE BLDC GLUCOMTR-MCNC: 292 MG/DL (ref 70–130)
GLUCOSE BLDC GLUCOMTR-MCNC: 367 MG/DL (ref 70–130)

## 2022-09-11 PROCEDURE — 63710000001 INSULIN DETEMIR PER 5 UNITS: Performed by: NURSE PRACTITIONER

## 2022-09-11 PROCEDURE — 82962 GLUCOSE BLOOD TEST: CPT

## 2022-09-11 PROCEDURE — 63710000001 INSULIN LISPRO (HUMAN) PER 5 UNITS: Performed by: INTERNAL MEDICINE

## 2022-09-11 PROCEDURE — 99232 SBSQ HOSP IP/OBS MODERATE 35: CPT | Performed by: INTERNAL MEDICINE

## 2022-09-11 RX ADMIN — Medication 10 ML: at 08:11

## 2022-09-11 RX ADMIN — LACTULOSE 20 G: 10 SOLUTION ORAL at 08:10

## 2022-09-11 RX ADMIN — FUROSEMIDE 40 MG: 40 TABLET ORAL at 08:09

## 2022-09-11 RX ADMIN — INSULIN LISPRO 4 UNITS: 100 INJECTION, SOLUTION INTRAVENOUS; SUBCUTANEOUS at 08:10

## 2022-09-11 RX ADMIN — SACUBITRIL AND VALSARTAN 1 TABLET: 24; 26 TABLET, FILM COATED ORAL at 08:10

## 2022-09-11 RX ADMIN — SACUBITRIL AND VALSARTAN 1 TABLET: 49; 51 TABLET, FILM COATED ORAL at 21:51

## 2022-09-11 RX ADMIN — INSULIN DETEMIR 30 UNITS: 100 INJECTION, SOLUTION SUBCUTANEOUS at 08:12

## 2022-09-11 RX ADMIN — VENLAFAXINE HYDROCHLORIDE 150 MG: 75 CAPSULE, EXTENDED RELEASE ORAL at 08:10

## 2022-09-11 RX ADMIN — ATORVASTATIN CALCIUM 10 MG: 10 TABLET, FILM COATED ORAL at 21:51

## 2022-09-11 RX ADMIN — RIFAXIMIN 550 MG: 550 TABLET ORAL at 08:10

## 2022-09-11 RX ADMIN — LACTULOSE 20 G: 10 SOLUTION ORAL at 21:51

## 2022-09-11 RX ADMIN — INSULIN LISPRO 6 UNITS: 100 INJECTION, SOLUTION INTRAVENOUS; SUBCUTANEOUS at 11:58

## 2022-09-11 RX ADMIN — LEVOTHYROXINE SODIUM 75 MCG: 0.07 TABLET ORAL at 06:22

## 2022-09-11 RX ADMIN — METOPROLOL SUCCINATE 100 MG: 50 TABLET, EXTENDED RELEASE ORAL at 08:09

## 2022-09-11 RX ADMIN — Medication 10 ML: at 21:52

## 2022-09-11 RX ADMIN — SENNOSIDES AND DOCUSATE SODIUM 2 TABLET: 50; 8.6 TABLET ORAL at 08:09

## 2022-09-11 RX ADMIN — SPIRONOLACTONE 100 MG: 25 TABLET ORAL at 08:10

## 2022-09-11 RX ADMIN — INSULIN LISPRO 4 UNITS: 100 INJECTION, SOLUTION INTRAVENOUS; SUBCUTANEOUS at 17:07

## 2022-09-11 RX ADMIN — RIFAXIMIN 550 MG: 550 TABLET ORAL at 21:51

## 2022-09-11 RX ADMIN — INSULIN DETEMIR 30 UNITS: 100 INJECTION, SOLUTION SUBCUTANEOUS at 21:53

## 2022-09-11 RX ADMIN — PANTOPRAZOLE SODIUM 40 MG: 40 TABLET, DELAYED RELEASE ORAL at 06:22

## 2022-09-11 NOTE — PROGRESS NOTES
[facility]  PROGRESS NOTE    Patient Name: Gwyn Fuchs  : 1950  MRN: [id number]    Date of Admission: 2022  Primary Care Physician: Riley Mckoy DO    Subjective   Subjective     CC:  Recurrent pleural effusion    HPI:  Patient resting in bed. Very pleasant.  No complaints at rest.     ROS:  Gen- No fevers, chills  CV- No chest pain, palpitations  Resp- no dyspnea at rest.  Coughing   GI- No N/V/D, abd pain      Objective   Objective     Vital Signs:   Temp:  [97.5 °F (36.4 °C)-98 °F (36.7 °C)] 97.7 °F (36.5 °C)  Heart Rate:  [] 109  Resp:  [18] 18  BP: (106-162)/(66-87) 162/87     Physical Exam:  Constitutional: alert in bed, NAD  HENT: NCAT, mucous membranes moist  Respiratory: Clear to auscultation bilaterally, nonlabored respirations on RA  Cardiovascular: RRR, no m/r.   Gastrointestinal: Positive bowel sounds, soft, nontender, nondistended  Musculoskeletal: No bilateral ankle edema  Psychiatric: Appropriate affect, cooperative  Neurologic: Oriented x 3, strength symmetric in all extremities, Cranial Nerves grossly intact to confrontation, speech clear  Skin: No rashes      Results Reviewed:  LAB RESULTS:      Lab 09/10/22  0502 09/09/22  0425 09/06/22  0258 09/05/22  1230   WBC 5.47 6.92 3.49 5.02   HEMOGLOBIN 10.9* 11.3* 10.2* 9.8*   HEMATOCRIT 33.3* 34.5* 32.6* 30.3*   PLATELETS 99* 112* 82* 83*   NEUTROS ABS 3.70 4.71  --  3.15   IMMATURE GRANS (ABS) 0.03 0.05  --  0.03   LYMPHS ABS 0.74 0.92  --  0.77   MONOS ABS 0.73 0.89  --  0.76   EOS ABS 0.25 0.32  --  0.29   MCV 84.5 85.8 90.6 86.8   LDH  --   --  356*  --    PROTIME  --   --   --  13.6         Lab 09/10/22  05022  1230   SODIUM 135* 129* 134* 136   POTASSIUM 4.0 3.7 3.7 3.9   CHLORIDE 101 94* 100 101   CO2 25.0 23.0 23.0 22.0   ANION GAP 9.0 12.0 11.0 13.0   BUN 18 18 13 14   CREATININE 1.11 1.32* 1.26 1.46*   EGFR 70.6 57.3* 60.6 50.8*    GLUCOSE 333* 323* 104* 227*   CALCIUM 8.4* 8.4* 8.7 9.0   HEMOGLOBIN A1C  --   --   --  9.30*         Lab 09/06/22  0258 09/05/22  1230   TOTAL PROTEIN 7.4 7.7   ALBUMIN 2.90* 3.20*   GLOBULIN 4.5 4.5   ALT (SGPT) 67* 58*   AST (SGOT) 108* 109*   BILIRUBIN 0.3 0.2   ALK PHOS 151* 146*         Lab 09/09/22  0425 09/05/22  1230   PROBNP 153.4 332.2   TROPONIN T  --  0.012   PROTIME  --  13.6   INR  --  1.05                 Brief Urine Lab Results     None          Microbiology Results Abnormal     Procedure Component Value - Date/Time    Anaerobic Culture - Pleural Fluid, Pleural Cavity [[id number]] Collected: 09/06/22 1123    Lab Status: Final result Specimen: Pleural Fluid from Pleural Cavity Updated: 09/11/22 0551     Anaerobic Culture No anaerobes isolated at 5 days    Body Fluid Culture - Body Fluid, Pleural Cavity [[id number]] Collected: 09/06/22 1123    Lab Status: Final result Specimen: Body Fluid from Pleural Cavity Updated: 09/09/22 1012     Body Fluid Culture No growth at 3 days     Gram Stain Many (4+) WBCs seen      No organisms seen    Fungus Smear - Body Fluid, Pleural Cavity [[id number]] Collected: 09/06/22 1123    Lab Status: Final result Specimen: Body Fluid from Pleural Cavity Updated: 09/07/22 1518     Fungal Stain No yeast or hyphal elements seen    AFB Culture - Body Fluid, Pleural Cavity [[id number]] Collected: 09/06/22 1123    Lab Status: Preliminary result Specimen: Body Fluid from Pleural Cavity Updated: 09/07/22 1159     AFB Stain No acid fast bacilli seen on concentrated smear    KOH Prep - Body Fluid, Pleural Cavity [[id number]] Collected: 09/06/22 1123    Lab Status: Final result Specimen: Body Fluid from Pleural Cavity Updated: 09/06/22 1250     KOH Prep No yeast or hyphal elements seen          No radiology results from the last 24 hrs    Results for orders placed during the hospital encounter of 09/05/22    Adult Transthoracic Echo Complete W/ Cont if Necessary Per  Protocol    Interpretation Summary  · Left ventricular wall thickness is consistent with mild concentric hypertrophy.  · Mildly reduced right ventricular systolic function noted.  · There is calcification of the aortic valve.  · Severe mitral valve regurgitation is present.  · Estimated right ventricular systolic pressure from tricuspid regurgitation is mildly elevated (35-45 mmHg). Calculated right ventricular systolic pressure from tricuspid regurgitation is 37 mmHg.  · There is a right pleural effusion.  · Global LV hypokinesis  · EF=30-35%      I have reviewed the medications:  Scheduled Meds:atorvastatin, 10 mg, Oral, Nightly  furosemide, 40 mg, Oral, Daily  insulin detemir, 30 Units, Subcutaneous, Q12H  insulin lispro, 0-7 Units, Subcutaneous, TID AC  lactulose, 20 g, Oral, BID  levothyroxine, 75 mcg, Oral, Q AM  metoprolol succinate XL, 100 mg, Oral, Q24H  pantoprazole, 40 mg, Oral, Q AM  rifAXIMin, 550 mg, Oral, Q12H  sacubitril-valsartan, 1 tablet, Oral, Q12H  senna-docusate sodium, 2 tablet, Oral, BID  sodium chloride, 10 mL, Intravenous, Q12H  spironolactone, 100 mg, Oral, Daily  venlafaxine XR, 150 mg, Oral, Daily With Breakfast      Continuous Infusions:   PRN Meds:.•  acetaminophen **OR** acetaminophen **OR** acetaminophen  •  senna-docusate sodium **AND** polyethylene glycol **AND** bisacodyl **AND** bisacodyl  •  dextrose  •  dextrose  •  glucagon (human recombinant)  •  ondansetron **OR** ondansetron  •  sodium chloride  •  sodium chloride    Assessment & Plan   Assessment & Plan     Active Hospital Problems    Diagnosis  POA   • **Recurrent right pleural effusion [J90]  Unknown   • Other cirrhosis of liver (HCC) [K74.69]  Yes   • Coronary artery disease involving native coronary artery of native heart without angina pectoris [I25.10]  Yes   • HTN (hypertension) [I10]  Yes   • Type 2 diabetes mellitus without complication, with long-term current use of insulin (HCC) [E11.9, Z79.4]  Not Applicable    • Impaired renal function [N28.9]  Yes      Resolved Hospital Problems   No resolved problems to display.     Hospital Course to date:   This is a 72-year-old male with Lezama cirrhosis, CAD s/p prior MI and stenting, DM2 who was initially evaluated outpatient for progressive orthopnea/DHILLON and identified to have large pleural effusion, s/p x2 thoracentesis at OSH with unclear laboratory testing/results, presenting to our facility with recurrent symptoms and large pleural effusion    This patient's problems and plans were partially entered by my partner and updated as appropriate by me 09/11/22.        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  - S/p large-volume thoracentesis at OSH 8/18, 8/24; via [facility] on an outpatient basis  - Admitted here 9/5 with recurrent effusion.  - IR thoracentesis 2000ml 9/6.  Transudate.  Cytology benign.   - CXR 9/9 shows reaccumulation:  Ordered re-tap for Monday   -ECHO EF 30 to 35% with severe MR, LV dysfunction:  DALE planned Weds  -GI consulted recommending 2 g sodium diet, Lasix 40 mg and spironolactone 100 mg daily  -  Did not recommend chest tube for the treatment of hepatic hydrothorax as this can lead to massive protein electrolyte depletion, infection, renal failure and bleeding.   - Poor candidate for TIPS due to PHTN and hep enceph      LEZAMA cirrhosis w/ anemia/thrombocytopenia  -Home Xifaxan, lactulose, PPI  -AST/ALT elevated and stable.  INR within normal limits.  -lactulose increased  -GI consulted  - Recs:  2 g sodium diet, Lasix 40 mg and spironolactone 100 mg daily       CAD s/p remote stenting, hx MI  Hypertension, Hyperlipidemia  Echo as above EF30-35% with severe MR  - increased toprol-XL 100mg, statin, Added Entresto, Lasix, Aldactone.  Increase Entresto today; watch K  -Previously followed by Dr. Osorio.  Reconsulted this admission.    - Currently asymptomatic so deferring ischemic evaluation  -Planning DALE  Wednesday if still hospitalized or outpatient.     DM type 2, unknown A1c, with long-term use of insulin  -A1c 9.3  - Baseline takes oral metformin and basal insulin 30U qAM / 25U qPM  - Increase Levemir 9/11.      CKD:  Stable, cr 1.1     DVT prophylaxis: SCDs    DVT prophylaxis:  Mechanical DVT prophylaxis orders are present.     AM-PAC 6 Clicks Score (PT): 24 (09/10/22 1023)    CODE STATUS:   Code Status and Medical Interventions:   Ordered at: 09/05/22 3192     Code Status (Patient has no pulse and is not breathing):    CPR (Attempt to Resuscitate)     Medical Interventions (Patient has pulse or is breathing):    Full Support         Ofe Dyer MD  09/11/22

## 2022-09-11 NOTE — PLAN OF CARE
Problem: Adult Inpatient Plan of Care  Goal: Plan of Care Review  Outcome: Ongoing, Progressing  Flowsheets  Taken 9/10/2022 1019 by Radha Dorantes  Plan of Care Reviewed With: patient  Outcome Evaluation: Physical therapy re-evaluation complete. The patient independent with transfers and ambulation. Patient ambulated 200 feet without AD and no loss of balance. Patient denies dyspnea during ambulation. Currently no acute PT needs identified. At hospital discharge recommend patient return to [facility].  Taken 9/7/2022 0449 by Noa Parkinson RN  Progress: improving  Goal: Patient-Specific Goal (Individualized)  Outcome: Ongoing, Progressing  Goal: Absence of Hospital-Acquired Illness or Injury  Outcome: Ongoing, Progressing  Intervention: Identify and Manage Fall Risk  Description: Perform standard risk assessment on admission using a validated tool or comprehensive approach appropriate to the patient; reassess fall risk frequently, with change in status or transfer to another level of care.  Communicate fall injury risk to interprofessional healthcare team.  Determine need for increased observation, equipment and environmental modification, such as low bed, signage and supportive, nonskid footwear.  Adjust safety measures to individual developmental age, stage and identified risk factors.  Reinforce the importance of safety and physical activity with patient and family.  Perform regular intentional rounding to assess need for position change, pain assessment and personal needs, including assistance with toileting.  Flowsheets  Taken 9/11/2022 6615  Safety Promotion/Fall Prevention:   activity supervised   assistive device/personal items within reach   clutter free environment maintained   nonskid shoes/slippers when out of bed   room organization consistent   safety round/check completed   toileting scheduled   fall prevention program maintained  Taken 9/11/2022 1200  Safety Promotion/Fall Prevention: assistive  device/personal items within reach  Taken 9/11/2022 0809  Safety Promotion/Fall Prevention:   activity supervised   assistive device/personal items within reach   clutter free environment maintained   fall prevention program maintained   nonskid shoes/slippers when out of bed   room organization consistent   safety round/check completed   toileting scheduled  Intervention: Prevent Skin Injury  Description: Perform a screening for skin injury risk, such as pressure or moisture associated skin damage on admission and at regular intervals throughout hospital stay.  Keep all areas of skin (especially folds) clean and dry.  Maintain adequate skin hydration.  Relieve and redistribute pressure and protect bony prominences; implement measures based on patient-specific risk factors.  Match turning and repositioning schedule to clinical condition.  Encourage weight shift frequently; assist with reposition if unable to complete independently.  Float heels off bed; avoid pressure on the Achilles tendon.  Keep skin free from extended contact with medical devices.  Encourage functional activity and mobility, as early as tolerated.  Use aids (e.g., slide boards, mechanical lift) during transfer.  Flowsheets  Taken 9/11/2022 1455  Body Position: position changed independently  Taken 9/11/2022 1200  Body Position: position changed independently  Taken 9/11/2022 0809  Body Position: position changed independently  Taken 9/8/2022 0824  Skin Protection:   other (see comments)   transparent dressing maintained   adhesive use limited  Intervention: Prevent and Manage VTE (Venous Thromboembolism) Risk  Description: Assess for VTE (venous thromboembolism) risk.  Encourage and assist with early ambulation.  Initiate and maintain compression or other therapy, as indicated, based on identified risk in accordance with organizational protocol and provider order.  Encourage both active and passive leg exercises while in bed, if unable to  ambulate.  Flowsheets  Taken 9/11/2022 1455  Activity Management:   activity adjusted per tolerance   dorsiflexion/plantar flexion performed  Taken 9/11/2022 1200  Activity Management: activity adjusted per tolerance  Taken 9/11/2022 0809  Activity Management:   activity adjusted per tolerance   dorsiflexion/plantar flexion performed  Taken 9/10/2022 0816  Range of Motion: active ROM (range of motion) encouraged  Intervention: Prevent Infection  Description: Maintain skin and mucous membrane integrity; promote hand, oral and pulmonary hygiene.  Optimize fluid balance, nutrition, sleep and glycemic control to maximize infection resistance.  Identify potential sources of infection early to prevent or mitigate progression of infection (e.g., wound, lines, devices).  Evaluate ongoing need for invasive devices; remove promptly when no longer indicated.  Flowsheets  Taken 9/11/2022 1300 by Mallory Valderrama PCT  Infection Prevention:   environmental surveillance performed   equipment surfaces disinfected   hand hygiene promoted   single patient room provided   rest/sleep promoted   visitors restricted/screened  Taken 9/11/2022 1200 by Keara Lindsey RN  Infection Prevention: hand hygiene promoted  Taken 9/11/2022 1000 by Keara Lindsey RN  Infection Prevention:   environmental surveillance performed   personal protective equipment utilized  Taken 9/11/2022 0809 by Keara Lindsey RN  Infection Prevention: environmental surveillance performed  Goal: Optimal Comfort and Wellbeing  Outcome: Ongoing, Progressing  Intervention: Monitor Pain and Promote Comfort  Description: Assess pain level, treatment efficacy and patient response at regular intervals using a consistent pain scale.  Consider the presence and impact of preexisting chronic pain.  Encourage patient and caregiver involvement in pain assessment, interventions and safety measures.  Flowsheets (Taken 9/8/2022 1750)  Pain Management Interventions: care  clustered  Intervention: Provide Person-Centered Care  Description: Use a family-focused approach to care.  Develop trust and rapport by proactively providing information, encouraging questions, addressing concerns and offering reassurance.  Acknowledge emotional response to hospitalization.  Recognize and utilize personal coping strategies.  Honor spiritual and cultural preferences.  Flowsheets (Taken 9/10/2022 1800)  Trust Relationship/Rapport:   care explained   choices provided   reassurance provided   thoughts/feelings acknowledged  Goal: Readiness for Transition of Care  Outcome: Ongoing, Progressing  Intervention: Mutually Develop Transition Plan  Description: Identify available resources for support (e.g., family, friends, community).  Identify and address barriers to ongoing treatment and home management (e.g., environmental, financial).  Provide opportunities to practice self-management skills.  Assess and monitor emotional readiness for transition.  Establish or reconnect linkage with outpatient providers or community-based services.  Flowsheets  Taken 9/6/2022 1024 by Hortensia Ji RN  Equipment Needed After Discharge: none  Discharge Coordination/Progress: He uses no DME at the facility. The staff assists with med administration. Meals and house keeping are provided. Staff assists with personall care as needed.  Equipment Currently Used at Home: none  Taken 9/5/2022 1708 by Kristie Hayden RN  Transportation Anticipated: family or friend will provide  Patient/Family Anticipated Services at Transition: none  Patient/Family Anticipates Transition to: (Assisted Living Facility) other (see comments)     Problem: Chest Pain  Goal: Resolution of Chest Pain Symptoms  Outcome: Ongoing, Progressing     Problem: Fall Injury Risk  Goal: Absence of Fall and Fall-Related Injury  Outcome: Ongoing, Progressing  Intervention: Identify and Manage Contributors  Description: Develop a fall prevention plan with the  patient and caregiver/family.  Provide reorientation, appropriate sensory stimulation and routines with changes in mental status to decrease risk of fall.  Promote use of personal vision and auditory aids.  Assess assistance level required for safe and effective self-care; provide support as needed, such as toileting, mobilization. For age 65 and older, implement timed toileting with assistance.  Encourage physical activity, such as performance of mobility and self-care at highest level of patient ability, multicomponent exercise program and provision of appropriate assistive devices.  If fall occurs, assess the severity of injury; implement fall injury protocol. Determine the cause and revise fall injury prevention plan.  Regularly review medication contribution to fall risk; adjust medication administration times to minimize risk of falling.  Consider risk related to polypharmacy and age.  Balance adequate pain management with potential for oversedation.  Flowsheets  Taken 9/11/2022 1455 by Keara Lindsey, RN  Medication Review/Management: medications reviewed  Taken 9/11/2022 1200 by Keara Lindsey, RN  Medication Review/Management: medications reviewed  Taken 9/11/2022 0809 by Keara Lindsey, RN  Medication Review/Management: medications reviewed  Taken 9/7/2022 1800 by Yazmin Johnson RN  Self-Care Promotion: independence encouraged  Intervention: Promote Injury-Free Environment  Description: Provide a safe, barrier-free environment that encourages independent activity.  Keep care area uncluttered and well-lighted.  Determine need for increased observation or monitoring.  Avoid use of devices that minimize mobility, such as restraints or indwelling urinary catheter.  Flowsheets  Taken 9/11/2022 1455  Safety Promotion/Fall Prevention:   activity supervised   assistive device/personal items within reach   clutter free environment maintained   nonskid shoes/slippers when out of bed   room organization consistent    safety round/check completed   toileting scheduled   fall prevention program maintained  Taken 9/11/2022 1200  Safety Promotion/Fall Prevention: assistive device/personal items within reach  Taken 9/11/2022 0809  Safety Promotion/Fall Prevention:   activity supervised   assistive device/personal items within reach   clutter free environment maintained   fall prevention program maintained   nonskid shoes/slippers when out of bed   room organization consistent   safety round/check completed   toileting scheduled   Goal Outcome Evaluation:

## 2022-09-12 ENCOUNTER — APPOINTMENT (OUTPATIENT)
Dept: GENERAL RADIOLOGY | Facility: HOSPITAL | Age: 72
End: 2022-09-12

## 2022-09-12 ENCOUNTER — APPOINTMENT (OUTPATIENT)
Dept: CARDIOLOGY | Facility: HOSPITAL | Age: 72
End: 2022-09-12

## 2022-09-12 LAB
ANION GAP SERPL CALCULATED.3IONS-SCNC: 11 MMOL/L (ref 5–15)
BUN SERPL-MCNC: 25 MG/DL (ref 8–23)
BUN/CREAT SERPL: 19.4 (ref 7–25)
CALCIUM SPEC-SCNC: 8.5 MG/DL (ref 8.6–10.5)
CHLORIDE SERPL-SCNC: 99 MMOL/L (ref 98–107)
CO2 SERPL-SCNC: 24 MMOL/L (ref 22–29)
CREAT SERPL-MCNC: 1.29 MG/DL (ref 0.76–1.27)
EGFRCR SERPLBLD CKD-EPI 2021: 58.9 ML/MIN/1.73
GLUCOSE BLDC GLUCOMTR-MCNC: 247 MG/DL (ref 70–130)
GLUCOSE BLDC GLUCOMTR-MCNC: 336 MG/DL (ref 70–130)
GLUCOSE BLDC GLUCOMTR-MCNC: 341 MG/DL (ref 70–130)
GLUCOSE SERPL-MCNC: 329 MG/DL (ref 65–99)
POTASSIUM SERPL-SCNC: 4 MMOL/L (ref 3.5–5.2)
SODIUM SERPL-SCNC: 134 MMOL/L (ref 136–145)

## 2022-09-12 PROCEDURE — 80048 BASIC METABOLIC PNL TOTAL CA: CPT | Performed by: INTERNAL MEDICINE

## 2022-09-12 PROCEDURE — 63710000001 INSULIN LISPRO (HUMAN) PER 5 UNITS: Performed by: INTERNAL MEDICINE

## 2022-09-12 PROCEDURE — 63710000001 INSULIN DETEMIR PER 5 UNITS: Performed by: NURSE PRACTITIONER

## 2022-09-12 PROCEDURE — 82962 GLUCOSE BLOOD TEST: CPT

## 2022-09-12 PROCEDURE — 71045 X-RAY EXAM CHEST 1 VIEW: CPT

## 2022-09-12 PROCEDURE — 99232 SBSQ HOSP IP/OBS MODERATE 35: CPT | Performed by: INTERNAL MEDICINE

## 2022-09-12 RX ORDER — INSULIN LISPRO 100 [IU]/ML
0-14 INJECTION, SOLUTION INTRAVENOUS; SUBCUTANEOUS
Status: DISCONTINUED | OUTPATIENT
Start: 2022-09-12 | End: 2022-09-14

## 2022-09-12 RX ADMIN — LACTULOSE 20 G: 10 SOLUTION ORAL at 22:26

## 2022-09-12 RX ADMIN — SACUBITRIL AND VALSARTAN 1 TABLET: 49; 51 TABLET, FILM COATED ORAL at 22:25

## 2022-09-12 RX ADMIN — METOPROLOL SUCCINATE 100 MG: 50 TABLET, EXTENDED RELEASE ORAL at 08:16

## 2022-09-12 RX ADMIN — RIFAXIMIN 550 MG: 550 TABLET ORAL at 22:25

## 2022-09-12 RX ADMIN — INSULIN LISPRO 5 UNITS: 100 INJECTION, SOLUTION INTRAVENOUS; SUBCUTANEOUS at 17:10

## 2022-09-12 RX ADMIN — LACTULOSE 20 G: 10 SOLUTION ORAL at 08:15

## 2022-09-12 RX ADMIN — INSULIN LISPRO 10 UNITS: 100 INJECTION, SOLUTION INTRAVENOUS; SUBCUTANEOUS at 11:57

## 2022-09-12 RX ADMIN — INSULIN LISPRO 5 UNITS: 100 INJECTION, SOLUTION INTRAVENOUS; SUBCUTANEOUS at 08:16

## 2022-09-12 RX ADMIN — SPIRONOLACTONE 100 MG: 25 TABLET ORAL at 08:16

## 2022-09-12 RX ADMIN — VENLAFAXINE HYDROCHLORIDE 150 MG: 75 CAPSULE, EXTENDED RELEASE ORAL at 08:16

## 2022-09-12 RX ADMIN — INSULIN DETEMIR 30 UNITS: 100 INJECTION, SOLUTION SUBCUTANEOUS at 22:25

## 2022-09-12 RX ADMIN — PANTOPRAZOLE SODIUM 40 MG: 40 TABLET, DELAYED RELEASE ORAL at 06:06

## 2022-09-12 RX ADMIN — LEVOTHYROXINE SODIUM 75 MCG: 0.07 TABLET ORAL at 06:06

## 2022-09-12 RX ADMIN — FUROSEMIDE 40 MG: 40 TABLET ORAL at 08:16

## 2022-09-12 RX ADMIN — SACUBITRIL AND VALSARTAN 1 TABLET: 49; 51 TABLET, FILM COATED ORAL at 08:15

## 2022-09-12 RX ADMIN — INSULIN DETEMIR 30 UNITS: 100 INJECTION, SOLUTION SUBCUTANEOUS at 08:18

## 2022-09-12 RX ADMIN — ATORVASTATIN CALCIUM 10 MG: 10 TABLET, FILM COATED ORAL at 22:26

## 2022-09-12 RX ADMIN — Medication 10 ML: at 22:26

## 2022-09-12 RX ADMIN — Medication 10 ML: at 08:17

## 2022-09-12 RX ADMIN — RIFAXIMIN 550 MG: 550 TABLET ORAL at 08:16

## 2022-09-12 RX ADMIN — SENNOSIDES AND DOCUSATE SODIUM 2 TABLET: 50; 8.6 TABLET ORAL at 22:25

## 2022-09-12 NOTE — CASE MANAGEMENT/SOCIAL WORK
Continued Stay Note  [facility]     Patient Name: Gwyn Fuchs  MRN: [id number]  Today's Date: 9/12/2022    Admit Date: 9/5/2022     Discharge Plan     Row Name 09/12/22 1427       Plan    Plan Assisted living with HH at [place]    Patient/Family in Agreement with Plan other (see comments)    Plan Comments Will arrange [facility] at [place]. Should be able to return to assisted living at [place] if he remains mobile.    Final Discharge Disposition Code 06 - home with home health care               Discharge Codes    No documentation.               Expected Discharge Date and Time     Expected Discharge Date Expected Discharge Time    Sep 12, 2022             Hortensia Ji RN

## 2022-09-12 NOTE — PROGRESS NOTES
[facility] Medicine Services  PROGRESS NOTE    Patient Name: Gwyn Fuchs  : 1950  MRN: [id number]    Date of Admission: 2022  Primary Care Physician: Riley Mckoy DO    Subjective   Subjective     CC:  Recurrent pleural effusion    HPI:  Contacted by interventional radiology about not doing thoracentesis today as effusion was smaller.  Also pharmacy discussed with me about increasing the sliding scale which I approved.  I reviewed cardiology's note and they are planning for a transesophageal echo 2022    ROS:  MSK- is ambulating  CV- No chest pain  Resp-coughing no dyspnea   GI- no diarrhea or constipation, good appetite  - no oliguria or dysuria  Neuro-denies insomnia      Objective   Objective     Vital Signs:   Temp:  [97.5 °F (36.4 °C)-98.4 °F (36.9 °C)] 97.5 °F (36.4 °C)  Heart Rate:  [88-97] 88  Resp:  [18-20] 18  BP: ()/(61-85) 151/85     Physical Exam:  Constitutional: alert in bed, NAD  HENT: NCAT, mucous membranes moist  Respiratory: Decreased breath sounds right lung base left lung clear no wheezing, nonlabored respirations on RA  Cardiovascular: RRR, no m/r.   Gastrointestinal: Positive bowel sounds, soft, nontender, nondistended  Musculoskeletal: No bilateral ankle edema  Psychiatric: Appropriate affect, cooperative  Neurologic: Oriented x 3, strength symmetric in all extremities, Cranial Nerves grossly intact to confrontation, speech clear  Skin: No rashes      Results Reviewed:  LAB RESULTS:      Lab 09/10/22  0502 09/09/22  0425 09/06/22  0258   WBC 5.47 6.92 3.49   HEMOGLOBIN 10.9* 11.3* 10.2*   HEMATOCRIT 33.3* 34.5* 32.6*   PLATELETS 99* 112* 82*   NEUTROS ABS 3.70 4.71  --    IMMATURE GRANS (ABS) 0.03 0.05  --    LYMPHS ABS 0.74 0.92  --    MONOS ABS 0.73 0.89  --    EOS ABS 0.25 0.32  --    MCV 84.5 85.8 90.6   LDH  --   --  356*         Lab 22  0328 09/10/22  0502 22  0258   SODIUM 134* 135* 129* 134*    POTASSIUM 4.0 4.0 3.7 3.7   CHLORIDE 99 101 94* 100   CO2 24.0 25.0 23.0 23.0   ANION GAP 11.0 9.0 12.0 11.0   BUN 25* 18 18 13   CREATININE 1.29* 1.11 1.32* 1.26   EGFR 58.9* 70.6 57.3* 60.6   GLUCOSE 329* 333* 323* 104*   CALCIUM 8.5* 8.4* 8.4* 8.7         Lab 09/06/22  0258   TOTAL PROTEIN 7.4   ALBUMIN 2.90*   GLOBULIN 4.5   ALT (SGPT) 67*   AST (SGOT) 108*   BILIRUBIN 0.3   ALK PHOS 151*         Lab 09/09/22  0425   PROBNP 153.4                 Brief Urine Lab Results     None          Microbiology Results Abnormal     Procedure Component Value - Date/Time    AFB Culture - Body Fluid, Pleural Cavity [[id number]] Collected: 09/06/22 1123    Lab Status: Preliminary result Specimen: Body Fluid from Pleural Cavity Updated: 09/11/22 1232     AFB Culture No AFB isolated at less than 1 week     AFB Stain No acid fast bacilli seen on concentrated smear    Fungus Culture - Body Fluid, Pleural Cavity [[id number]] Collected: 09/06/22 1123    Lab Status: Preliminary result Specimen: Body Fluid from Pleural Cavity Updated: 09/11/22 1232     Fungus Culture No fungus isolated at less than 1 week    Anaerobic Culture - Pleural Fluid, Pleural Cavity [[id number]] Collected: 09/06/22 1123    Lab Status: Final result Specimen: Pleural Fluid from Pleural Cavity Updated: 09/11/22 0551     Anaerobic Culture No anaerobes isolated at 5 days    Body Fluid Culture - Body Fluid, Pleural Cavity [[id number]] Collected: 09/06/22 1123    Lab Status: Final result Specimen: Body Fluid from Pleural Cavity Updated: 09/09/22 1012     Body Fluid Culture No growth at 3 days     Gram Stain Many (4+) WBCs seen      No organisms seen    Fungus Smear - Body Fluid, Pleural Cavity [[id number]] Collected: 09/06/22 1123    Lab Status: Final result Specimen: Body Fluid from Pleural Cavity Updated: 09/07/22 1518     Fungal Stain No yeast or hyphal elements seen    KOH Prep - Body Fluid, Pleural Cavity [[id number]] Collected: 09/06/22 1123    Lab Status: Final  result Specimen: Body Fluid from Pleural Cavity Updated: 09/06/22 1250     KOH Prep No yeast or hyphal elements seen          XR Chest 1 View    Result Date: 9/12/2022  Examination: XR CHEST 1 VW-  Date of Exam: 9/12/2022 7:58 AM  Indication: eval effusion; G41-Ownjszd effusion, not elsewhere classified; R06.02-Shortness of breath.  Comparison: Radiograph 09/09/2022, 09/06/2022  Technique: 1 view of the chest  Findings: Mild patchy airspace changes present within the right lung base. Small right-sided pleural effusion present. No pneumothorax. The heart size is normal. The pulmonary vasculature appears within normal limits. No acute osseous abnormality identified.      Impression: Mild patchy airspace changes present within the right lung base with small right-sided pleural effusion, improved as compared to the previous study.  This report was finalized on 9/12/2022 8:16 AM by Jennifer Robert MD.        Results for orders placed during the hospital encounter of 09/05/22    Adult Transthoracic Echo Complete W/ Cont if Necessary Per Protocol    Interpretation Summary  · Left ventricular wall thickness is consistent with mild concentric hypertrophy.  · Mildly reduced right ventricular systolic function noted.  · There is calcification of the aortic valve.  · Severe mitral valve regurgitation is present.  · Estimated right ventricular systolic pressure from tricuspid regurgitation is mildly elevated (35-45 mmHg). Calculated right ventricular systolic pressure from tricuspid regurgitation is 37 mmHg.  · There is a right pleural effusion.  · Global LV hypokinesis  · EF=30-35%      I have reviewed the medications:  Scheduled Meds:atorvastatin, 10 mg, Oral, Nightly  furosemide, 40 mg, Oral, Daily  insulin detemir, 30 Units, Subcutaneous, Q12H  insulin lispro, 0-14 Units, Subcutaneous, TID AC  lactulose, 20 g, Oral, BID  levothyroxine, 75 mcg, Oral, Q AM  metoprolol succinate XL, 100 mg, Oral, Q24H  pantoprazole, 40 mg, Oral, Q  AM  rifAXIMin, 550 mg, Oral, Q12H  sacubitril-valsartan, 1 tablet, Oral, Q12H  senna-docusate sodium, 2 tablet, Oral, BID  sodium chloride, 10 mL, Intravenous, Q12H  spironolactone, 100 mg, Oral, Daily  venlafaxine XR, 150 mg, Oral, Daily With Breakfast      Continuous Infusions:hold, 1 each      PRN Meds:.•  acetaminophen **OR** acetaminophen **OR** acetaminophen  •  senna-docusate sodium **AND** polyethylene glycol **AND** bisacodyl **AND** bisacodyl  •  dextrose  •  dextrose  •  glucagon (human recombinant)  •  hold  •  ondansetron **OR** ondansetron  •  sodium chloride  •  sodium chloride    Assessment & Plan   Assessment & Plan     Active Hospital Problems    Diagnosis  POA   • **Recurrent right pleural effusion [J90]  Unknown   • Other cirrhosis of liver (HCC) [K74.69]  Yes   • Coronary artery disease involving native coronary artery of native heart without angina pectoris [I25.10]  Yes   • HTN (hypertension) [I10]  Yes   • Type 2 diabetes mellitus without complication, with long-term current use of insulin (HCC) [E11.9, Z79.4]  Not Applicable   • Impaired renal function [N28.9]  Yes      Resolved Hospital Problems   No resolved problems to display.     Hospital Course to date:   This is a 72-year-old male with Bhandari cirrhosis, CAD s/p prior MI and stenting, DM2 who was initially evaluated outpatient for progressive orthopnea/DHILLON and identified to have large pleural effusion, s/p x2 thoracentesis at OSH with unclear laboratory testing/results, presenting to our facility with recurrent symptoms and large pleural effusion    This patient's problems and plans were partially entered by my partner and updated as appropriate by me 09/12/22.        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  - S/p large-volume thoracentesis at OSH 8/18, 8/24; via [facility] on an outpatient basis  - Admitted here 9/5 with recurrent effusion.  - IR thoracentesis 2000ml 9/6.  Transudate.   Cytology benign.   - CXR 9/9 shows reaccumulation which was improved 9/12/2022 so thoracentesis was DC'd after discussion with interventional radiology.  -ECHO EF 30 to 35% with severe MR, LV dysfunction:  DALE planned Weds 9/14/2022  -GI consulted recommending 2 g sodium diet, Lasix 40 mg and spironolactone 100 mg daily  -  Did not recommend chest tube for the treatment of hepatic hydrothorax as this can lead to massive protein electrolyte depletion, infection, renal failure and bleeding.   - Poor candidate for TIPS due to PHTN and hep enceph      LEZAMA cirrhosis w/ anemia/thrombocytopenia  -Home Xifaxan, lactulose, PPI  -AST/ALT elevated and stable.  INR within normal limits.  -lactulose increased  -GI consulted  - Recs:  2 g sodium diet, Lasix 40 mg and spironolactone 100 mg daily       CAD s/p remote stenting, hx MI  Hypertension, Hyperlipidemia  Echo as above EF30-35% with severe MR  - increased toprol-XL 100mg, statin, Added Entresto, Lasix, Aldactone.  Increase Entresto today; watch K  -Previously followed by Dr. Osorio.  Reconsulted this admission.    - Currently asymptomatic so deferring ischemic evaluation  -Planning DALE Wednesday if still hospitalized or outpatient.     DM type 2, unknown A1c, with long-term use of insulin  -A1c 9.3  - Baseline takes oral metformin and basal insulin 30U qAM / 25U qPM  - Increase Levemir 9/11.   -Increase sliding scale 9/12/2022     CKD:  Stable, cr  1.29     DVT prophylaxis: SCDs    DVT prophylaxis:  Mechanical DVT prophylaxis orders are present.     Anticipate discharge 9/14/2022 following transesophageal echo by private vehicle to assisted living facility.    AM-PAC 6 Clicks Score (PT): 24 (09/12/22 0800)    CODE STATUS:   Code Status and Medical Interventions:   Ordered at: 09/05/22 0270     Code Status (Patient has no pulse and is not breathing):    CPR (Attempt to Resuscitate)     Medical Interventions (Patient has pulse or is breathing):    Full Support          Aram Howell, DO  09/12/22

## 2022-09-12 NOTE — PLAN OF CARE
Goal Outcome Evaluation:  Plan of Care Reviewed With: patient        Progress: improving  Outcome Evaluation: Pt VSS on RA. No complaints this shift. Plan for DALE Wednesday per cardiology.

## 2022-09-12 NOTE — PROGRESS NOTES
" [facility] - Progress Note    Gwyn READ Delph  1950  [id number]    09/12/22, 08:07 EDT      Chief Complaint: Following for LV dysfunction    Subjective:   Sitting up on bedside eating bfast.  Did well over wknd - meds adjusted for better BP control with fairly good response.    Plan for repeat thoracentesis today.      Review of Systems:  Pertinent positives are listed above and in physical exam.  All others have been reviewed and are negative.    atorvastatin, 10 mg, Oral, Nightly  furosemide, 40 mg, Oral, Daily  insulin detemir, 30 Units, Subcutaneous, Q12H  insulin lispro, 0-7 Units, Subcutaneous, TID AC  lactulose, 20 g, Oral, BID  levothyroxine, 75 mcg, Oral, Q AM  metoprolol succinate XL, 100 mg, Oral, Q24H  pantoprazole, 40 mg, Oral, Q AM  rifAXIMin, 550 mg, Oral, Q12H  sacubitril-valsartan, 1 tablet, Oral, Q12H  senna-docusate sodium, 2 tablet, Oral, BID  sodium chloride, 10 mL, Intravenous, Q12H  spironolactone, 100 mg, Oral, Daily  venlafaxine XR, 150 mg, Oral, Daily With Breakfast        Objective:  Vitals:   height is 180.3 cm (71\") and weight is 75.8 kg (167 lb). His oral temperature is 97.9 °F (36.6 °C). His blood pressure is 159/79 and his pulse is 94. His respiration is 18 and oxygen saturation is 100%.     No intake or output data in the 24 hours ending 09/12/22 0807    Physical Exam:  General:  WN, NAD, A and O x3.  CV:  Normal S1,S2.  . No murmur, rub, or gallop.  Resp:  CTA Fransico, equal, nonlabored.  Abd:  Soft, + BS, no organomegaly. Nontender to palpation.  Extrem:  No edema BLE, 2+ pedal/PT pulses.            Results from last 7 days   Lab Units 09/10/22  0502   WBC 10*3/mm3 5.47   HEMOGLOBIN g/dL 10.9*   HEMATOCRIT % 33.3*   PLATELETS 10*3/mm3 99*     Results from last 7 days   Lab Units 09/12/22  0328 09/09/22  0425 09/06/22  0258   SODIUM mmol/L 134*   < > 134*   POTASSIUM mmol/L 4.0   < > 3.7   CHLORIDE mmol/L 99   < > 100   CO2 mmol/L 24.0   < > 23.0   BUN mg/dL 25*   < > 13 "   CREATININE mg/dL 1.29*   < > 1.26   CALCIUM mg/dL 8.5*   < > 8.7   BILIRUBIN mg/dL  --   --  0.3   ALK PHOS U/L  --   --  151*   ALT (SGPT) U/L  --   --  67*   AST (SGOT) U/L  --   --  108*   GLUCOSE mg/dL 329*   < > 104*    < > = values in this interval not displayed.     Results from last 7 days   Lab Units 09/05/22  1230   INR  1.05             Results from last 7 days   Lab Units 09/09/22  0425   PROBNP pg/mL 153.4       Tele:  ST    ASSESSMENT:  -  New Onset LV dysfunction, EF 30-35%  -  Severe MR by TTE  -  Recurrent Right Pleural Effusion  -  CAD  -  Essential Hypertension  -  LEZAMA Cirrhosis        PLAN:  -  71 yo CM with PMHx of CAD (stent 2008, patent stent 2015) with normal EF by cath at that time.  Presents with cough, orthopnea, SOA in setting of recurrent pleural effusion and LEZAMA cirrhosis.  ProBNP is WNL.  He is in NSR.  Echo shows severe MR, EF 30-35%.  LV Dysfunction by echo is new but overall, patient appears to be asymptomatic.  Repeat ProBNP is 153  -  Patient is currently asymptomatic; would not pursue ischemic evaluation at this time with multiple co morbidities.  -  Will proceed with DALE  On Wednesday (Dr. Osorio) at 2:30 in [facility].  -  Continue Toprol XL to 100 mg daily, Entresto 49-51 mg BID.  Lasix and Aldactone have been added by GI for effusions and hepatic hydrothorax.  -  Monitor I/Os, daily wts, nutrition.  -  Repeat CXR today confirms increased fluid/atelectasis in Right lung. Discussed with Hospitalist.  Repeat thoracentesis today.   -  Ambulate          I discussed the patient's findings and my recommendations with the patient, any present family members, and the nursing staff.  Yovani Valenzuela MD saw and examined patient, verified hx and PE, read all radiographic studies, reviewed labs and micro data, and formulated dx, plan for treatment and all medical decision making.      Milena Fleming PA-C  09/12/22, 08:10 EDT

## 2022-09-12 NOTE — PROGRESS NOTES
IR Thoracentesis was cancelled as right sided pleural effusion is improved on CXR.   He continues to tolerate diuretics.  Creatinine stable at 1.29    >>> Recommend he continue Lasix 40 mg daily and Spironolactone 100 mg daily  >>> Low sodium diet (< 2,000 mg daily)     Outpatient follow up with [facility] in 3 weeks.       We will sign off.  Please call for questions or concerns.

## 2022-09-13 LAB
ANION GAP SERPL CALCULATED.3IONS-SCNC: 12 MMOL/L (ref 5–15)
BUN SERPL-MCNC: 20 MG/DL (ref 8–23)
BUN/CREAT SERPL: 18.2 (ref 7–25)
CALCIUM SPEC-SCNC: 8.7 MG/DL (ref 8.6–10.5)
CHLORIDE SERPL-SCNC: 99 MMOL/L (ref 98–107)
CO2 SERPL-SCNC: 24 MMOL/L (ref 22–29)
CREAT SERPL-MCNC: 1.1 MG/DL (ref 0.76–1.27)
EGFRCR SERPLBLD CKD-EPI 2021: 71.3 ML/MIN/1.73
GLUCOSE BLDC GLUCOMTR-MCNC: 284 MG/DL (ref 70–130)
GLUCOSE BLDC GLUCOMTR-MCNC: 304 MG/DL (ref 70–130)
GLUCOSE BLDC GLUCOMTR-MCNC: 316 MG/DL (ref 70–130)
GLUCOSE SERPL-MCNC: 324 MG/DL (ref 65–99)
POTASSIUM SERPL-SCNC: 4.1 MMOL/L (ref 3.5–5.2)
SODIUM SERPL-SCNC: 135 MMOL/L (ref 136–145)

## 2022-09-13 PROCEDURE — 80048 BASIC METABOLIC PNL TOTAL CA: CPT | Performed by: PHYSICIAN ASSISTANT

## 2022-09-13 PROCEDURE — 99232 SBSQ HOSP IP/OBS MODERATE 35: CPT | Performed by: INTERNAL MEDICINE

## 2022-09-13 PROCEDURE — 63710000001 INSULIN DETEMIR PER 5 UNITS: Performed by: NURSE PRACTITIONER

## 2022-09-13 PROCEDURE — 63710000001 INSULIN LISPRO (HUMAN) PER 5 UNITS: Performed by: INTERNAL MEDICINE

## 2022-09-13 PROCEDURE — 82962 GLUCOSE BLOOD TEST: CPT

## 2022-09-13 RX ADMIN — SPIRONOLACTONE 100 MG: 25 TABLET ORAL at 08:20

## 2022-09-13 RX ADMIN — ATORVASTATIN CALCIUM 10 MG: 10 TABLET, FILM COATED ORAL at 21:06

## 2022-09-13 RX ADMIN — FUROSEMIDE 40 MG: 40 TABLET ORAL at 08:21

## 2022-09-13 RX ADMIN — LACTULOSE 20 G: 10 SOLUTION ORAL at 21:06

## 2022-09-13 RX ADMIN — VENLAFAXINE HYDROCHLORIDE 150 MG: 75 CAPSULE, EXTENDED RELEASE ORAL at 08:21

## 2022-09-13 RX ADMIN — Medication 10 ML: at 08:21

## 2022-09-13 RX ADMIN — SACUBITRIL AND VALSARTAN 1 TABLET: 49; 51 TABLET, FILM COATED ORAL at 21:06

## 2022-09-13 RX ADMIN — METOPROLOL SUCCINATE 100 MG: 50 TABLET, EXTENDED RELEASE ORAL at 08:21

## 2022-09-13 RX ADMIN — RIFAXIMIN 550 MG: 550 TABLET ORAL at 08:21

## 2022-09-13 RX ADMIN — LACTULOSE 20 G: 10 SOLUTION ORAL at 08:20

## 2022-09-13 RX ADMIN — INSULIN DETEMIR 30 UNITS: 100 INJECTION, SOLUTION SUBCUTANEOUS at 21:07

## 2022-09-13 RX ADMIN — INSULIN LISPRO 10 UNITS: 100 INJECTION, SOLUTION INTRAVENOUS; SUBCUTANEOUS at 08:22

## 2022-09-13 RX ADMIN — PANTOPRAZOLE SODIUM 40 MG: 40 TABLET, DELAYED RELEASE ORAL at 05:33

## 2022-09-13 RX ADMIN — INSULIN DETEMIR 30 UNITS: 100 INJECTION, SOLUTION SUBCUTANEOUS at 08:22

## 2022-09-13 RX ADMIN — INSULIN LISPRO 8 UNITS: 100 INJECTION, SOLUTION INTRAVENOUS; SUBCUTANEOUS at 17:08

## 2022-09-13 RX ADMIN — SACUBITRIL AND VALSARTAN 1 TABLET: 49; 51 TABLET, FILM COATED ORAL at 08:21

## 2022-09-13 RX ADMIN — INSULIN LISPRO 10 UNITS: 100 INJECTION, SOLUTION INTRAVENOUS; SUBCUTANEOUS at 11:20

## 2022-09-13 RX ADMIN — Medication 10 ML: at 21:07

## 2022-09-13 RX ADMIN — RIFAXIMIN 550 MG: 550 TABLET ORAL at 21:06

## 2022-09-13 RX ADMIN — LEVOTHYROXINE SODIUM 75 MCG: 0.07 TABLET ORAL at 05:33

## 2022-09-13 NOTE — PLAN OF CARE
Problem: Adult Inpatient Plan of Care  Goal: Plan of Care Review  Outcome: Ongoing, Progressing     Problem: Fall Injury Risk  Goal: Absence of Fall and Fall-Related Injury  Outcome: Ongoing, Progressing   Goal Outcome Evaluation:  Plan of Care Reviewed With: patient

## 2022-09-13 NOTE — PAYOR COMM NOTE
"Sylvia Ramos (72 y.o. Male)             Date of Birth   1950    Social Security Number       Address   [address] [address]    Home Phone   [phone]    MRN   [id number]       Temple   None    Marital Status                               Admission Date   9/5/22    Admission Type   Emergency    Admitting Provider   Aram Howell DO    Attending Provider   Aram Howell DO    Department, Room/Bed   [address], S519/1       Discharge Date       Discharge Disposition       Discharge Destination                               Attending Provider: Aram Howell DO    Allergies: Penicillins    Isolation: None   Infection: None   Code Status: CPR   Advance Care Planning Activity    Ht: 180.3 cm (71\")   Wt: 75.8 kg (167 lb)    Admission Cmt: None   Principal Problem: Recurrent right pleural effusion [J90]                 Active Insurance as of 9/5/2022     Primary Coverage     Payor Plan Insurance Group Employer/Plan Group    ANTHEM MEDICARE REPLACEMENT ANTHEM MEDICARE ADVANTAGE [id number]     Payor Plan Address Payor Plan Phone Number Payor Plan Fax Number Effective Dates    [address] [phone]  1/1/2016 - None Entered    [facility] [address]       Subscriber Name Subscriber Birth Date Member ID       SYLVIA RAMOS 1950 [id number]                 Emergency Contacts      (Rel.) Home Phone Work Phone Mobile Phone    CATRACHITO HARP (Daughter) [phone] -- --    RACHELALEKSANDRA (Daughter) [phone] -- --    JAZIEL RAMOS (Brother) [phone] -- --            [facility] [id number] Tax ID [id number]  Insurance Information                ANTHEM MEDICARE REPLACEMENT/ANTHEM MEDICARE ADVANTAGE Phone: [phone]    Subscriber: Sylvia Ramos Subscriber#: [id number]    Group#: [id number] Precert#: [id number]          Vital Signs (last day)     Date/Time Temp Temp src Pulse Resp BP Patient Position SpO2    09/13/22 1047 97.7 " (36.5) Oral 95 18 148/83 Lying 95    09/13/22 0821 -- -- 97 -- -- -- --    09/13/22 0725 98 (36.7) Oral -- 18 163/92 Lying --    09/13/22 0401 98.1 (36.7) Oral 90 18 155/86 Lying 93    09/12/22 1948 97.8 (36.6) Oral 83 18 163/86 Lying 97    09/12/22 1546 98.4 (36.9) Oral 80 18 138/81 Lying --    09/12/22 1123 97.5 (36.4) Oral 88 18 151/85 Lying 95    09/12/22 0815 -- -- 97 -- -- -- --    09/12/22 0709 97.9 (36.6) Oral 94 18 159/79 Lying --    09/12/22 0326 97.9 (36.6) Oral -- 18 140/72 Lying --            Current Facility-Administered Medications   Medication Dose Route Frequency Provider Last Rate Last Admin   • acetaminophen (TYLENOL) tablet 500 mg  500 mg Oral Q6H PRN Reid Warren DO        Or   • acetaminophen (TYLENOL) 160 MG/5ML solution 500 mg  500 mg Oral Q6H PRN Reid Warren DO        Or   • acetaminophen (TYLENOL) suppository 325 mg  325 mg Rectal Q6H PRN Reid Warren DO       • atorvastatin (LIPITOR) tablet 10 mg  10 mg Oral Nightly Reid Warren DO   10 mg at 09/12/22 2226   • sennosides-docusate (PERICOLACE) 8.6-50 MG per tablet 2 tablet  2 tablet Oral BID Reid Warren DO   2 tablet at 09/12/22 2225    And   • polyethylene glycol (MIRALAX) packet 17 g  17 g Oral Daily PRN Reid Warren DO        And   • bisacodyl (DULCOLAX) EC tablet 5 mg  5 mg Oral Daily PRN Reid Warren DO        And   • bisacodyl (DULCOLAX) suppository 10 mg  10 mg Rectal Daily PRN Reid Warren DO       • dextrose (D50W) (25 g/50 mL) IV injection 25 g  25 g Intravenous Q15 Min PRN Reid Warren,        • dextrose (GLUTOSE) oral gel 15 g  15 g Oral Q15 Min PRN Reid Warren DO       • furosemide (LASIX) tablet 40 mg  40 mg Oral Daily Juana Tomlin PA   40 mg at 09/13/22 0821   • glucagon (human recombinant) (GLUCAGEN DIAGNOSTIC) injection 1 mg  1 mg Intramuscular Q15 Min PRN Reid Warren DO       • Hold medication  1 each Does not apply  Continuous PRN Ofe Dyer MD       • insulin detemir (LEVEMIR) injection 30 Units  30 Units Subcutaneous Q12H Nena Aguilar APRN   30 Units at 09/13/22 0822   • Insulin Lispro (humaLOG) injection 0-14 Units  0-14 Units Subcutaneous TID Aram France DO   10 Units at 09/13/22 1120   • lactulose (CHRONULAC) 10 GM/15ML solution 20 g  20 g Oral BID Juana Tomlin PA   20 g at 09/13/22 0820   • levothyroxine (SYNTHROID, LEVOTHROID) tablet 75 mcg  75 mcg Oral Q AM Reid Warren DO   75 mcg at 09/13/22 0533   • metoprolol succinate XL (TOPROL-XL) 24 hr tablet 100 mg  100 mg Oral Q24H Milena Fleming PA   100 mg at 09/13/22 0821   • ondansetron (ZOFRAN) tablet 4 mg  4 mg Oral Q6H PRN Reid Warren DO        Or   • ondansetron (ZOFRAN) injection 4 mg  4 mg Intravenous Q6H PRN Reid Warren DO       • pantoprazole (PROTONIX) EC tablet 40 mg  40 mg Oral Q AM Reid Warren DO   40 mg at 09/13/22 0533   • riFAXIMin (XIFAXAN) tablet 550 mg  550 mg Oral Q12H Reid Warren DO   550 mg at 09/13/22 0821   • sacubitril-valsartan (ENTRESTO) 49-51 MG tablet 1 tablet  1 tablet Oral Q12H Ofe Dyer MD   1 tablet at 09/13/22 0821   • sodium chloride 0.9 % flush 10 mL  10 mL Intravenous PRN Ray Hayden MD       • sodium chloride 0.9 % flush 10 mL  10 mL Intravenous Q12H Reid Warren DO   10 mL at 09/13/22 0821   • sodium chloride 0.9 % flush 10 mL  10 mL Intravenous PRN Reid Warren DO       • spironolactone (ALDACTONE) tablet 100 mg  100 mg Oral Daily Juana Tomlin PA   100 mg at 09/13/22 0820   • venlafaxine XR (EFFEXOR-XR) 24 hr capsule 150 mg  150 mg Oral Daily With Breakfast Reid Warren DO   150 mg at 09/13/22 0821       Lab Results (last 24 hours)     Procedure Component Value Units Date/Time    AFB Culture - Body Fluid, Pleural Cavity [[id number]] Collected: 09/06/22 1123    Specimen: Body Fluid from Pleural Cavity Updated: 09/13/22  1232     AFB Culture No AFB isolated at 1 week     AFB Stain No acid fast bacilli seen on concentrated smear    Fungus Culture - Body Fluid, Pleural Cavity [[id number]] Collected: 09/06/22 1123    Specimen: Body Fluid from Pleural Cavity Updated: 09/13/22 1232     Fungus Culture No fungus isolated at 1 week    POC Glucose Once [[id number]]  (Abnormal) Collected: 09/13/22 1047    Specimen: Blood Updated: 09/13/22 1049     Glucose 304 mg/dL      Comment: Meter: [id number] : [id number] [facility] Bessie       POC Glucose Once [[id number]]  (Abnormal) Collected: 09/13/22 0724    Specimen: Blood Updated: 09/13/22 0726     Glucose 316 mg/dL      Comment: Meter: [id number] : [id number] [facility]ie       Basic Metabolic Panel [[id number]]  (Abnormal) Collected: 09/13/22 0400    Specimen: Blood Updated: 09/13/22 0440     Glucose 324 mg/dL      BUN 20 mg/dL      Creatinine 1.10 mg/dL      Sodium 135 mmol/L      Potassium 4.1 mmol/L      Chloride 99 mmol/L      CO2 24.0 mmol/L      Calcium 8.7 mg/dL      BUN/Creatinine Ratio 18.2     Anion Gap 12.0 mmol/L      eGFR 71.3 mL/min/1.73      Comment: National Kidney Foundation and American Society of Nephrology (ASN) Task Force recommended calculation based on the Chronic Kidney Disease Epidemiology Collaboration (CKD-EPI) equation refit without adjustment for race.       Narrative:      GFR Normal >60  Chronic Kidney Disease <60  Kidney Failure <15      POC Glucose Once [[id number]]  (Abnormal) Collected: 09/12/22 1548    Specimen: Blood Updated: 09/12/22 1549     Glucose 247 mg/dL      Comment: Meter: [id number] : [id number] [facility]ory           Imaging Results (Last 24 Hours)     ** No results found for the last 24 hours. **        Orders (last 24 hrs)      Start     Ordered    09/14/22 0001  NPO Diet NPO Type: Strict NPO  Diet Effective Midnight         09/13/22 0925 09/13/22 1050  POC Glucose Once  PROCEDURE ONCE         09/13/22 1047    09/13/22 0925  Obtain Informed  Consent  Once        Comments: This will be done by Dr. Osorio on Wednesday afternoon, 9/14/22 in [facility].    09/13/22 0925    09/13/22 0727  POC Glucose Once  PROCEDURE ONCE         09/13/22 0724    09/13/22 0600  Basic Metabolic Panel  Morning Draw         09/12/22 0814    09/12/22 1550  POC Glucose Once  PROCEDURE ONCE         09/12/22 1548    09/12/22 1230  Insulin Lispro (humaLOG) injection 0-14 Units  3 Times Daily Before Meals         09/12/22 1135    09/12/22 0000  Ambulatory Referral to Gastroenterology        Comments: 3 week hospital follow up    09/12/22 1631    09/11/22 2100  sacubitril-valsartan (ENTRESTO) 49-51 MG tablet 1 tablet  Every 12 Hours Scheduled         09/11/22 0815    09/11/22 0809  Hold medication  Continuous PRN         09/11/22 0811    09/10/22 1200  insulin detemir (LEVEMIR) injection 30 Units  Every 12 Hours Scheduled         09/10/22 1109    09/09/22 0800  Ambulate Patient  2 Times Daily      Comments: With assistance    09/09/22 0757    09/08/22 1145  metoprolol succinate XL (TOPROL-XL) 24 hr tablet 100 mg  Every 24 Hours Scheduled         09/08/22 0905    09/07/22 2100  lactulose (CHRONULAC) 10 GM/15ML solution 20 g  2 Times Daily         09/07/22 0930    09/07/22 1030  furosemide (LASIX) tablet 40 mg  Daily         09/07/22 0930    09/07/22 1030  spironolactone (ALDACTONE) tablet 100 mg  Daily         09/07/22 0930    09/06/22 0800  venlafaxine XR (EFFEXOR-XR) 24 hr capsule 150 mg  Daily With Breakfast         09/05/22 1639    09/06/22 0600  levothyroxine (SYNTHROID, LEVOTHROID) tablet 75 mcg  Every Early Morning         09/05/22 1639    09/06/22 0600  pantoprazole (PROTONIX) EC tablet 40 mg  Every Early Morning         09/05/22 1639    09/05/22 2100  riFAXIMin (XIFAXAN) tablet 550 mg  Every 12 Hours Scheduled         09/05/22 1639 09/05/22 2100  atorvastatin (LIPITOR) tablet 10 mg  Nightly         09/05/22 1639    09/05/22 2100  sodium chloride 0.9 % flush 10 mL  Every 12 Hours  "Scheduled         09/05/22 1639    09/05/22 2100  sennosides-docusate (PERICOLACE) 8.6-50 MG per tablet 2 tablet  2 Times Daily        \"And\" Linked Group Details    09/05/22 1639    09/05/22 2000  Vital Signs  Every 4 Hours       09/05/22 1639    09/05/22 1800  Oral Care  2 Times Daily       09/05/22 1639    09/05/22 1700  POC Glucose TID AC  3 Times Daily Before Meals       09/05/22 1630    09/05/22 1640  Intake & Output  Every Shift       09/05/22 1639    09/05/22 1639  sodium chloride 0.9 % flush 10 mL  As Needed         09/05/22 1639    09/05/22 1639  acetaminophen (TYLENOL) tablet 500 mg  Every 6 Hours PRN        \"Or\" Linked Group Details    09/05/22 1639    09/05/22 1639  acetaminophen (TYLENOL) 160 MG/5ML solution 500 mg  Every 6 Hours PRN        \"Or\" Linked Group Details    09/05/22 1639    09/05/22 1639  acetaminophen (TYLENOL) suppository 325 mg  Every 6 Hours PRN        \"Or\" Linked Group Details    09/05/22 1639    09/05/22 1639  ondansetron (ZOFRAN) tablet 4 mg  Every 6 Hours PRN        \"Or\" Linked Group Details    09/05/22 1639    09/05/22 1639  ondansetron (ZOFRAN) injection 4 mg  Every 6 Hours PRN        \"Or\" Linked Group Details    09/05/22 1639    09/05/22 1639  polyethylene glycol (MIRALAX) packet 17 g  Daily PRN        \"And\" Linked Group Details    09/05/22 1639    09/05/22 1639  bisacodyl (DULCOLAX) EC tablet 5 mg  Daily PRN        \"And\" Linked Group Details    09/05/22 1639    09/05/22 1639  bisacodyl (DULCOLAX) suppository 10 mg  Daily PRN        \"And\" Linked Group Details    09/05/22 1639    09/05/22 1630  dextrose (GLUTOSE) oral gel 15 g  Every 15 Minutes PRN         09/05/22 1630    09/05/22 1630  dextrose (D50W) (25 g/50 mL) IV injection 25 g  Every 15 Minutes PRN         09/05/22 1630    09/05/22 1630  glucagon (human recombinant) (GLUCAGEN DIAGNOSTIC) injection 1 mg  Every 15 Minutes PRN         09/05/22 1630    09/05/22 1131  sodium chloride 0.9 % flush 10 mL  As Needed         09/05/22 " 1132    Unscheduled  Follow Hypoglycemia Standing Orders (Blood Glucose <70)  As Needed      Comments: Follow Protocol As Outlined in Process Instructions (Open Order Report to View Full Instructions)    22 1630    Unscheduled  Oxygen Therapy- Nasal Cannula; Titrate for SPO2: 88% - 92%  Continuous PRN       22 1631    --  rifAXIMin (XIFAXAN) 200 MG tablet  2 Times Daily         22    --  lactulose (CHRONULAC) 10 GM/15ML solution  3 Times Daily         22    --  levothyroxine (SYNTHROID, LEVOTHROID) 75 MCG tablet  Daily         22    --  metoprolol succinate XL (TOPROL-XL) 50 MG 24 hr tablet  Daily         22    --  venlafaxine XR (EFFEXOR-XR) 75 MG 24 hr capsule  Daily         22    --  metFORMIN (GLUCOPHAGE) 1000 MG tablet  Every Evening         22    --  simvastatin (ZOCOR) 40 MG tablet  Nightly         22    --  insulin lispro protamine-insulin lispro (humaLOG 75-25) (75-25) 100 UNIT/ML suspension injection  Every Morning         22 1007    --  insulin lispro protamine-insulin lispro (humaLOG 75-25) (75-25) 100 UNIT/ML suspension injection  Nightly         22 1007    Signed and Held  insulin detemir (LEVEMIR) injection 36 Units  Every 12 Hours Scheduled         Signed and Held    Signed and Held  No Thoracentesis Labs Needed  Once         Signed and Held                   Physician Progress Notes (last 24 hours)      Aram Howell DO at 22 1326              [facility] Medicine Services  PROGRESS NOTE    Patient Name: Gwyn Fuchs  : 1950  MRN: [id number]    Date of Admission: 2022  Primary Care Physician: Riley Mckoy DO    Subjective   Subjective     CC:  Recurrent pleural effusion    HPI:  Talked with the patient as well as his daughter today.  Shortness of breath is stable is having bowel movements is making urine is tolerating food eating at night snacks  too.    ROS:  MSK- is ambulating  CV- No chest pain  Resp-coughing no dyspnea   GI- no diarrhea or constipation, good appetite  - no oliguria or dysuria  Neuro-denies insomnia      Objective   Objective     Vital Signs:   Temp:  [97.7 °F (36.5 °C)-98.4 °F (36.9 °C)] 97.7 °F (36.5 °C)  Heart Rate:  [80-97] 95  Resp:  [18] 18  BP: (138-163)/(81-92) 148/83     Physical Exam:  Constitutional: alert in bed, NAD  HENT: NCAT, mucous membranes moist  Respiratory: Decreased breath sounds right lung base left lung clear no wheezing, nonlabored respirations on RA  Cardiovascular: RRR, no m/r.   Gastrointestinal: Positive bowel sounds, soft, nontender, nondistended  Musculoskeletal: No bilateral ankle edema  Psychiatric: Appropriate affect, cooperative  Neurologic: Oriented x 3, strength symmetric in all extremities, Cranial Nerves grossly intact to confrontation, speech clear  Skin: No rashes      Results Reviewed:  LAB RESULTS:      Lab 09/10/22  0502 09/09/22 0425   WBC 5.47 6.92   HEMOGLOBIN 10.9* 11.3*   HEMATOCRIT 33.3* 34.5*   PLATELETS 99* 112*   NEUTROS ABS 3.70 4.71   IMMATURE GRANS (ABS) 0.03 0.05   LYMPHS ABS 0.74 0.92   MONOS ABS 0.73 0.89   EOS ABS 0.25 0.32   MCV 84.5 85.8         Lab 09/13/22  0400 09/12/22  0328 09/10/22  0502 09/09/22  0425   SODIUM 135* 134* 135* 129*   POTASSIUM 4.1 4.0 4.0 3.7   CHLORIDE 99 99 101 94*   CO2 24.0 24.0 25.0 23.0   ANION GAP 12.0 11.0 9.0 12.0   BUN 20 25* 18 18   CREATININE 1.10 1.29* 1.11 1.32*   EGFR 71.3 58.9* 70.6 57.3*   GLUCOSE 324* 329* 333* 323*   CALCIUM 8.7 8.5* 8.4* 8.4*             Lab 09/09/22  0425   PROBNP 153.4                 Brief Urine Lab Results     None          Microbiology Results Abnormal     Procedure Component Value - Date/Time    AFB Culture - Body Fluid, Pleural Cavity [[id number]] Collected: 09/06/22 1123    Lab Status: Preliminary result Specimen: Body Fluid from Pleural Cavity Updated: 09/13/22 1232     AFB Culture No AFB isolated at 1 week      AFB Stain No acid fast bacilli seen on concentrated smear    Fungus Culture - Body Fluid, Pleural Cavity [[id number]] Collected: 09/06/22 1123    Lab Status: Preliminary result Specimen: Body Fluid from Pleural Cavity Updated: 09/13/22 1232     Fungus Culture No fungus isolated at 1 week    Anaerobic Culture - Pleural Fluid, Pleural Cavity [[id number]] Collected: 09/06/22 1123    Lab Status: Final result Specimen: Pleural Fluid from Pleural Cavity Updated: 09/11/22 0551     Anaerobic Culture No anaerobes isolated at 5 days    Body Fluid Culture - Body Fluid, Pleural Cavity [[id number]] Collected: 09/06/22 1123    Lab Status: Final result Specimen: Body Fluid from Pleural Cavity Updated: 09/09/22 1012     Body Fluid Culture No growth at 3 days     Gram Stain Many (4+) WBCs seen      No organisms seen    Fungus Smear - Body Fluid, Pleural Cavity [[id number]] Collected: 09/06/22 1123    Lab Status: Final result Specimen: Body Fluid from Pleural Cavity Updated: 09/07/22 1518     Fungal Stain No yeast or hyphal elements seen    KOH Prep - Body Fluid, Pleural Cavity [[id number]] Collected: 09/06/22 1123    Lab Status: Final result Specimen: Body Fluid from Pleural Cavity Updated: 09/06/22 1250     KOH Prep No yeast or hyphal elements seen          XR Chest 1 View    Result Date: 9/12/2022  Examination: XR CHEST 1 VW-  Date of Exam: 9/12/2022 7:58 AM  Indication: eval effusion; P83-Nybhrna effusion, not elsewhere classified; R06.02-Shortness of breath.  Comparison: Radiograph 09/09/2022, 09/06/2022  Technique: 1 view of the chest  Findings: Mild patchy airspace changes present within the right lung base. Small right-sided pleural effusion present. No pneumothorax. The heart size is normal. The pulmonary vasculature appears within normal limits. No acute osseous abnormality identified.      Impression: Mild patchy airspace changes present within the right lung base with small right-sided pleural effusion, improved as  compared to the previous study.  This report was finalized on 9/12/2022 8:16 AM by Jennifer Robert MD.        Results for orders placed during the hospital encounter of 09/05/22    Adult Transthoracic Echo Complete W/ Cont if Necessary Per Protocol    Interpretation Summary  · Left ventricular wall thickness is consistent with mild concentric hypertrophy.  · Mildly reduced right ventricular systolic function noted.  · There is calcification of the aortic valve.  · Severe mitral valve regurgitation is present.  · Estimated right ventricular systolic pressure from tricuspid regurgitation is mildly elevated (35-45 mmHg). Calculated right ventricular systolic pressure from tricuspid regurgitation is 37 mmHg.  · There is a right pleural effusion.  · Global LV hypokinesis  · EF=30-35%      I have reviewed the medications:  Scheduled Meds:atorvastatin, 10 mg, Oral, Nightly  furosemide, 40 mg, Oral, Daily  insulin detemir, 30 Units, Subcutaneous, Q12H  insulin lispro, 0-14 Units, Subcutaneous, TID AC  lactulose, 20 g, Oral, BID  levothyroxine, 75 mcg, Oral, Q AM  metoprolol succinate XL, 100 mg, Oral, Q24H  pantoprazole, 40 mg, Oral, Q AM  rifAXIMin, 550 mg, Oral, Q12H  sacubitril-valsartan, 1 tablet, Oral, Q12H  senna-docusate sodium, 2 tablet, Oral, BID  sodium chloride, 10 mL, Intravenous, Q12H  spironolactone, 100 mg, Oral, Daily  venlafaxine XR, 150 mg, Oral, Daily With Breakfast      Continuous Infusions:hold, 1 each      PRN Meds:.•  acetaminophen **OR** acetaminophen **OR** acetaminophen  •  senna-docusate sodium **AND** polyethylene glycol **AND** bisacodyl **AND** bisacodyl  •  dextrose  •  dextrose  •  glucagon (human recombinant)  •  hold  •  ondansetron **OR** ondansetron  •  sodium chloride  •  sodium chloride    Assessment & Plan   Assessment & Plan     Active Hospital Problems    Diagnosis  POA   • **Recurrent right pleural effusion [J90]  Unknown   • Other cirrhosis of liver (HCC) [K74.69]  Yes   • Coronary  artery disease involving native coronary artery of native heart without angina pectoris [I25.10]  Yes   • HTN (hypertension) [I10]  Yes   • Type 2 diabetes mellitus without complication, with long-term current use of insulin (HCC) [E11.9, Z79.4]  Not Applicable   • Impaired renal function [N28.9]  Yes      Resolved Hospital Problems   No resolved problems to display.     Hospital Course to date:   This is a 72-year-old male with Lezama cirrhosis, CAD s/p prior MI and stenting, DM2 who was initially evaluated outpatient for progressive orthopnea/DHILLON and identified to have large pleural effusion, s/p x2 thoracentesis at OSH with unclear laboratory testing/results, presenting to our facility with recurrent symptoms and large pleural effusion    This patient's problems and plans were partially entered by my partner and updated as appropriate by me 09/13/22.        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  - S/p large-volume thoracentesis at OSH 8/18, 8/24; via [facility] on an outpatient basis  - Admitted here 9/5 with recurrent effusion.  - IR thoracentesis 2000ml 9/6.  Transudate.  Cytology benign.   - CXR 9/9 shows reaccumulation which was improved 9/12/2022 so thoracentesis was DC'd after discussion with interventional radiology.  -ECHO EF 30 to 35% with severe MR, LV dysfunction:  DALE planned Weds 9/14/2022  -GI consulted recommending 2 g sodium diet, Lasix 40 mg and spironolactone 100 mg daily follow-up with GI in 3 weeks [facility]  -  Did not recommend chest tube for the treatment of hepatic hydrothorax as this can lead to massive protein electrolyte depletion, infection, renal failure and bleeding.   - Poor candidate for TIPS due to PHTN and hep enceph      LEZAMA cirrhosis w/ anemia/thrombocytopenia  -Home Xifaxan, lactulose, PPI  -AST/ALT elevated and stable.  INR within normal limits.  -lactulose increased  -GI consulted  - Recs:  2 g sodium diet, Lasix 40 mg and  spironolactone 100 mg daily       CAD s/p remote stenting, hx MI  Hypertension, Hyperlipidemia  Echo as above EF30-35% with severe MR  - increased toprol-XL 100mg, statin, Added Entresto, Lasix, Aldactone.  Increased Entresto ; watch K  -Previously followed by Dr. Osorio.  Reconsulted this admission.    - Currently asymptomatic so deferring ischemic evaluation  -Planning DALE Wednesday if still hospitalized or outpatient.     DM type 2, unknown A1c, with long-term use of insulin  -A1c 9.3   qPM  - Increase Levemir 9/11 is hyperglycemic today we will continue same dose of Levemir as the patient will be n.p.o. after midnight for tomorrow and wished to avoid hypoglycemia.  -Increase sliding scale 9/12/2022  -Did education on dosing for home is on a 75/25 mix will increase the dose to 35 in the morning and 30 at night at home.  Encouraged him to titrate based upon what his blood sugars are doing and keep a log of his blood sugars to follow-up with his primary care.     CKD:  Stable, cr  1.1     DVT prophylaxis: SCDs    DVT prophylaxis:  Mechanical DVT prophylaxis orders are present.     Anticipate discharge 9/14/2022 following transesophageal echo by private vehicle to assisted living facility.    AM-PAC 6 Clicks Score (PT): 24 (09/13/22 0822)    CODE STATUS:   Code Status and Medical Interventions:   Ordered at: 09/05/22 1624     Code Status (Patient has no pulse and is not breathing):    CPR (Attempt to Resuscitate)     Medical Interventions (Patient has pulse or is breathing):    Full Support         Aram Howell DO  09/13/22                Electronically signed by Aram Howell DO at 09/13/22 1414     Yovani Valenzuela MD at 09/13/22 0922           [facility] - Progress Note    Gwyn READ Delph  1950  S519/1    09/13/22, 09:22 EDT      Chief Complaint: Following for LV dysfunction    Subjective:   Awake in bed, no family present.    Breathing is okay, no current cough.    Thoracentesis  "cancelled yesterday as CXR showed decrease in effusion.      Review of Systems:  Pertinent positives are listed above and in physical exam.  All others have been reviewed and are negative.    atorvastatin, 10 mg, Oral, Nightly  furosemide, 40 mg, Oral, Daily  insulin detemir, 30 Units, Subcutaneous, Q12H  insulin lispro, 0-14 Units, Subcutaneous, TID AC  lactulose, 20 g, Oral, BID  levothyroxine, 75 mcg, Oral, Q AM  metoprolol succinate XL, 100 mg, Oral, Q24H  pantoprazole, 40 mg, Oral, Q AM  rifAXIMin, 550 mg, Oral, Q12H  sacubitril-valsartan, 1 tablet, Oral, Q12H  senna-docusate sodium, 2 tablet, Oral, BID  sodium chloride, 10 mL, Intravenous, Q12H  spironolactone, 100 mg, Oral, Daily  venlafaxine XR, 150 mg, Oral, Daily With Breakfast        Objective:  Vitals:   height is 180.3 cm (71\") and weight is 75.8 kg (167 lb). His oral temperature is 98 °F (36.7 °C). His blood pressure is 163/92 and his pulse is 97. His respiration is 18 and oxygen saturation is 93%.     No intake or output data in the 24 hours ending 09/13/22 0922    Physical Exam:  General:  WN, NAD, A and O x3.  CV:  Normal S1,S2.  . No murmur, rub, or gallop.  Resp:  CTA Fransico, equal, nonlabored.  Abd:  Soft, + BS, no organomegaly. Nontender to palpation.  Extrem:  No edema BLE, 2+ pedal/PT pulses.            Results from last 7 days   Lab Units 09/10/22  0502   WBC 10*3/mm3 5.47   HEMOGLOBIN g/dL 10.9*   HEMATOCRIT % 33.3*   PLATELETS 10*3/mm3 99*     Results from last 7 days   Lab Units 09/13/22  0400   SODIUM mmol/L 135*   POTASSIUM mmol/L 4.1   CHLORIDE mmol/L 99   CO2 mmol/L 24.0   BUN mg/dL 20   CREATININE mg/dL 1.10   CALCIUM mg/dL 8.7   GLUCOSE mg/dL 324*                 Results from last 7 days   Lab Units 09/09/22  0425   PROBNP pg/mL 153.4       Tele:  ST    ASSESSMENT:  -  New Onset LV dysfunction, EF 30-35%  -  Severe MR by TTE  -  Recurrent Right Pleural Effusion  -  CAD  -  Essential Hypertension  -  LEZAMA Cirrhosis        PLAN:  -  71 yo " CM with PMHx of CAD (stent 2008, patent stent 2015) with normal EF by cath at that time.  Presents with cough, orthopnea, SOA in setting of recurrent pleural effusion and LEZAMA cirrhosis.  ProBNP is WNL.  He is in NSR.  Echo shows severe MR, EF 30-35%.  LV Dysfunction by echo is new but overall, patient appears to be asymptomatic.  Repeat ProBNP is 153  -  Patient is currently asymptomatic; would not pursue ischemic evaluation at this time with multiple co morbidities.  -  Will proceed with DALE with Dr. Osorio, Wednesday  at 2:30 in [facility].  -  Continue Toprol XL to 100 mg daily, Entresto 49-51 mg BID.  Lasix and Aldactone have been added by GI for effusions and hepatic hydrothorax.  -  Monitor I/Os, daily wts, nutrition.  -  Ambulate          I discussed the patient's findings and my recommendations with the patient, any present family members, and the nursing staff.  Yovani Valenzuela MD saw and examined patient, verified hx and PE, read all radiographic studies, reviewed labs and micro data, and formulated dx, plan for treatment and all medical decision making.      Milena Fleming PA-C  09/13/22, 09:24 EDT                      Electronically signed by Yovani Valenzuela MD at 09/13/22 0918     Juana Tomlin PA at 09/12/22 1626        IR Thoracentesis was cancelled as right sided pleural effusion is improved on CXR.   He continues to tolerate diuretics.  Creatinine stable at 1.29    >>> Recommend he continue Lasix 40 mg daily and Spironolactone 100 mg daily  >>> Low sodium diet (< 2,000 mg daily)     Outpatient follow up with [facility] in 3 weeks.       We will sign off.  Please call for questions or concerns.          Electronically signed by Juana Tomlin PA at 09/12/22 1980       Consult Notes (last 24 hours)  Notes from 09/12/22 1548 through 09/13/22 1548   No notes of this type exist for this encounter.

## 2022-09-13 NOTE — PROGRESS NOTES
[facility]  PROGRESS NOTE    Patient Name: Gwyn Fuchs  : 1950  MRN: [id number]    Date of Admission: 2022  Primary Care Physician: Riley Mckoy DO    Subjective   Subjective     CC:  Recurrent pleural effusion    HPI:  Talked with the patient as well as his daughter today.  Shortness of breath is stable is having bowel movements is making urine is tolerating food eating at night snacks too.    ROS:  MSK- is ambulating  CV- No chest pain  Resp-coughing no dyspnea   GI- no diarrhea or constipation, good appetite  - no oliguria or dysuria  Neuro-denies insomnia      Objective   Objective     Vital Signs:   Temp:  [97.7 °F (36.5 °C)-98.4 °F (36.9 °C)] 97.7 °F (36.5 °C)  Heart Rate:  [80-97] 95  Resp:  [18] 18  BP: (138-163)/(81-92) 148/83     Physical Exam:  Constitutional: alert in bed, NAD  HENT: NCAT, mucous membranes moist  Respiratory: Decreased breath sounds right lung base left lung clear no wheezing, nonlabored respirations on RA  Cardiovascular: RRR, no m/r.   Gastrointestinal: Positive bowel sounds, soft, nontender, nondistended  Musculoskeletal: No bilateral ankle edema  Psychiatric: Appropriate affect, cooperative  Neurologic: Oriented x 3, strength symmetric in all extremities, Cranial Nerves grossly intact to confrontation, speech clear  Skin: No rashes      Results Reviewed:  LAB RESULTS:      Lab 09/10/22  0502 22  0425   WBC 5.47 6.92   HEMOGLOBIN 10.9* 11.3*   HEMATOCRIT 33.3* 34.5*   PLATELETS 99* 112*   NEUTROS ABS 3.70 4.71   IMMATURE GRANS (ABS) 0.03 0.05   LYMPHS ABS 0.74 0.92   MONOS ABS 0.73 0.89   EOS ABS 0.25 0.32   MCV 84.5 85.8         Lab 22  0400 22  0328 09/10/22  0502 22  0425   SODIUM 135* 134* 135* 129*   POTASSIUM 4.1 4.0 4.0 3.7   CHLORIDE 99 99 101 94*   CO2 24.0 24.0 25.0 23.0   ANION GAP 12.0 11.0 9.0 12.0   BUN 20 25* 18 18   CREATININE 1.10 1.29* 1.11 1.32*   EGFR 71.3 58.9* 70.6 57.3*    GLUCOSE 324* 329* 333* 323*   CALCIUM 8.7 8.5* 8.4* 8.4*             Lab 09/09/22  0425   PROBNP 153.4                 Brief Urine Lab Results     None          Microbiology Results Abnormal     Procedure Component Value - Date/Time    AFB Culture - Body Fluid, Pleural Cavity [[id number]] Collected: 09/06/22 1123    Lab Status: Preliminary result Specimen: Body Fluid from Pleural Cavity Updated: 09/13/22 1232     AFB Culture No AFB isolated at 1 week     AFB Stain No acid fast bacilli seen on concentrated smear    Fungus Culture - Body Fluid, Pleural Cavity [[id number]] Collected: 09/06/22 1123    Lab Status: Preliminary result Specimen: Body Fluid from Pleural Cavity Updated: 09/13/22 1232     Fungus Culture No fungus isolated at 1 week    Anaerobic Culture - Pleural Fluid, Pleural Cavity [[id number]] Collected: 09/06/22 1123    Lab Status: Final result Specimen: Pleural Fluid from Pleural Cavity Updated: 09/11/22 0551     Anaerobic Culture No anaerobes isolated at 5 days    Body Fluid Culture - Body Fluid, Pleural Cavity [[id number]] Collected: 09/06/22 1123    Lab Status: Final result Specimen: Body Fluid from Pleural Cavity Updated: 09/09/22 1012     Body Fluid Culture No growth at 3 days     Gram Stain Many (4+) WBCs seen      No organisms seen    Fungus Smear - Body Fluid, Pleural Cavity [[id number]] Collected: 09/06/22 1123    Lab Status: Final result Specimen: Body Fluid from Pleural Cavity Updated: 09/07/22 1518     Fungal Stain No yeast or hyphal elements seen    KOH Prep - Body Fluid, Pleural Cavity [[id number]] Collected: 09/06/22 1123    Lab Status: Final result Specimen: Body Fluid from Pleural Cavity Updated: 09/06/22 1250     KOH Prep No yeast or hyphal elements seen          XR Chest 1 View    Result Date: 9/12/2022  Examination: XR CHEST 1 VW-  Date of Exam: 9/12/2022 7:58 AM  Indication: eval effusion; V13-Hkaqgwv effusion, not elsewhere classified; R06.02-Shortness of breath.  Comparison: Radiograph  09/09/2022, 09/06/2022  Technique: 1 view of the chest  Findings: Mild patchy airspace changes present within the right lung base. Small right-sided pleural effusion present. No pneumothorax. The heart size is normal. The pulmonary vasculature appears within normal limits. No acute osseous abnormality identified.      Impression: Mild patchy airspace changes present within the right lung base with small right-sided pleural effusion, improved as compared to the previous study.  This report was finalized on 9/12/2022 8:16 AM by Jennifer Robert MD.        Results for orders placed during the hospital encounter of 09/05/22    Adult Transthoracic Echo Complete W/ Cont if Necessary Per Protocol    Interpretation Summary  · Left ventricular wall thickness is consistent with mild concentric hypertrophy.  · Mildly reduced right ventricular systolic function noted.  · There is calcification of the aortic valve.  · Severe mitral valve regurgitation is present.  · Estimated right ventricular systolic pressure from tricuspid regurgitation is mildly elevated (35-45 mmHg). Calculated right ventricular systolic pressure from tricuspid regurgitation is 37 mmHg.  · There is a right pleural effusion.  · Global LV hypokinesis  · EF=30-35%      I have reviewed the medications:  Scheduled Meds:atorvastatin, 10 mg, Oral, Nightly  furosemide, 40 mg, Oral, Daily  insulin detemir, 30 Units, Subcutaneous, Q12H  insulin lispro, 0-14 Units, Subcutaneous, TID AC  lactulose, 20 g, Oral, BID  levothyroxine, 75 mcg, Oral, Q AM  metoprolol succinate XL, 100 mg, Oral, Q24H  pantoprazole, 40 mg, Oral, Q AM  rifAXIMin, 550 mg, Oral, Q12H  sacubitril-valsartan, 1 tablet, Oral, Q12H  senna-docusate sodium, 2 tablet, Oral, BID  sodium chloride, 10 mL, Intravenous, Q12H  spironolactone, 100 mg, Oral, Daily  venlafaxine XR, 150 mg, Oral, Daily With Breakfast      Continuous Infusions:hold, 1 each      PRN Meds:.•  acetaminophen **OR** acetaminophen **OR**  acetaminophen  •  senna-docusate sodium **AND** polyethylene glycol **AND** bisacodyl **AND** bisacodyl  •  dextrose  •  dextrose  •  glucagon (human recombinant)  •  hold  •  ondansetron **OR** ondansetron  •  sodium chloride  •  sodium chloride    Assessment & Plan   Assessment & Plan     Active Hospital Problems    Diagnosis  POA   • **Recurrent right pleural effusion [J90]  Unknown   • Other cirrhosis of liver (HCC) [K74.69]  Yes   • Coronary artery disease involving native coronary artery of native heart without angina pectoris [I25.10]  Yes   • HTN (hypertension) [I10]  Yes   • Type 2 diabetes mellitus without complication, with long-term current use of insulin (HCC) [E11.9, Z79.4]  Not Applicable   • Impaired renal function [N28.9]  Yes      Resolved Hospital Problems   No resolved problems to display.     Hospital Course to date:   This is a 72-year-old male with Bhandari cirrhosis, CAD s/p prior MI and stenting, DM2 who was initially evaluated outpatient for progressive orthopnea/DHILLON and identified to have large pleural effusion, s/p x2 thoracentesis at OSH with unclear laboratory testing/results, presenting to our facility with recurrent symptoms and large pleural effusion    This patient's problems and plans were partially entered by my partner and updated as appropriate by me 09/13/22.        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  - S/p large-volume thoracentesis at OSH 8/18, 8/24; via [facility] on an outpatient basis  - Admitted here 9/5 with recurrent effusion.  - IR thoracentesis 2000ml 9/6.  Transudate.  Cytology benign.   - CXR 9/9 shows reaccumulation which was improved 9/12/2022 so thoracentesis was DC'd after discussion with interventional radiology.  -ECHO EF 30 to 35% with severe MR, LV dysfunction:  DALE planned Weds 9/14/2022  -GI consulted recommending 2 g sodium diet, Lasix 40 mg and spironolactone 100 mg daily follow-up with GI in 3 weeks  [facility] [place]  -  Did not recommend chest tube for the treatment of hepatic hydrothorax as this can lead to massive protein electrolyte depletion, infection, renal failure and bleeding.   - Poor candidate for TIPS due to PHTN and hep enceph      LEZAMA cirrhosis w/ anemia/thrombocytopenia  -Home Xifaxan, lactulose, PPI  -AST/ALT elevated and stable.  INR within normal limits.  -lactulose increased  -GI consulted  - Recs:  2 g sodium diet, Lasix 40 mg and spironolactone 100 mg daily       CAD s/p remote stenting, hx MI  Hypertension, Hyperlipidemia  Echo as above EF30-35% with severe MR  - increased toprol-XL 100mg, statin, Added Entresto, Lasix, Aldactone.  Increased Entresto ; watch K  -Previously followed by Dr. Osorio.  Reconsulted this admission.    - Currently asymptomatic so deferring ischemic evaluation  -Planning DALE Wednesday if still hospitalized or outpatient.     DM type 2, unknown A1c, with long-term use of insulin  -A1c 9.3   qPM  - Increase Levemir 9/11 is hyperglycemic today we will continue same dose of Levemir as the patient will be n.p.o. after midnight for tomorrow and wished to avoid hypoglycemia.  -Increase sliding scale 9/12/2022  -Did education on dosing for home is on a 75/25 mix will increase the dose to 35 in the morning and 30 at night at home.  Encouraged him to titrate based upon what his blood sugars are doing and keep a log of his blood sugars to follow-up with his primary care.     CKD:  Stable, cr  1.1     DVT prophylaxis: SCDs    DVT prophylaxis:  Mechanical DVT prophylaxis orders are present.     Anticipate discharge 9/14/2022 following transesophageal echo by private vehicle to assisted living facility.    AM-PAC 6 Clicks Score (PT): 24 (09/13/22 0729)    CODE STATUS:   Code Status and Medical Interventions:   Ordered at: 09/05/22 3527     Code Status (Patient has no pulse and is not breathing):    CPR (Attempt to Resuscitate)     Medical Interventions (Patient has pulse or is breathing):     Full Support         Aram Howell, DO  09/13/22

## 2022-09-13 NOTE — PLAN OF CARE
Goal Outcome Evaluation:  Plan of Care Reviewed With: patient        Progress: improving  Outcome Evaluation: Pt VSS on RA. No c/o pain. Plan for DALE tomorrow.

## 2022-09-13 NOTE — PROGRESS NOTES
" [facility] - Progress Note    Gwyn READ Del  1950  S519/1    09/13/22, 09:22 EDT      Chief Complaint: Following for LV dysfunction    Subjective:   Awake in bed, no family present.    Breathing is okay, no current cough.    Thoracentesis cancelled yesterday as CXR showed decrease in effusion.      Review of Systems:  Pertinent positives are listed above and in physical exam.  All others have been reviewed and are negative.    atorvastatin, 10 mg, Oral, Nightly  furosemide, 40 mg, Oral, Daily  insulin detemir, 30 Units, Subcutaneous, Q12H  insulin lispro, 0-14 Units, Subcutaneous, TID AC  lactulose, 20 g, Oral, BID  levothyroxine, 75 mcg, Oral, Q AM  metoprolol succinate XL, 100 mg, Oral, Q24H  pantoprazole, 40 mg, Oral, Q AM  rifAXIMin, 550 mg, Oral, Q12H  sacubitril-valsartan, 1 tablet, Oral, Q12H  senna-docusate sodium, 2 tablet, Oral, BID  sodium chloride, 10 mL, Intravenous, Q12H  spironolactone, 100 mg, Oral, Daily  venlafaxine XR, 150 mg, Oral, Daily With Breakfast        Objective:  Vitals:   height is 180.3 cm (71\") and weight is 75.8 kg (167 lb). His oral temperature is 98 °F (36.7 °C). His blood pressure is 163/92 and his pulse is 97. His respiration is 18 and oxygen saturation is 93%.     No intake or output data in the 24 hours ending 09/13/22 0922    Physical Exam:  General:  WN, NAD, A and O x3.  CV:  Normal S1,S2.  . No murmur, rub, or gallop.  Resp:  CTA Fransico, equal, nonlabored.  Abd:  Soft, + BS, no organomegaly. Nontender to palpation.  Extrem:  No edema BLE, 2+ pedal/PT pulses.            Results from last 7 days   Lab Units 09/10/22  0502   WBC 10*3/mm3 5.47   HEMOGLOBIN g/dL 10.9*   HEMATOCRIT % 33.3*   PLATELETS 10*3/mm3 99*     Results from last 7 days   Lab Units 09/13/22  0400   SODIUM mmol/L 135*   POTASSIUM mmol/L 4.1   CHLORIDE mmol/L 99   CO2 mmol/L 24.0   BUN mg/dL 20   CREATININE mg/dL 1.10   CALCIUM mg/dL 8.7   GLUCOSE mg/dL 324*                 Results from last " 7 days   Lab Units 09/09/22  0425   PROBNP pg/mL 153.4       Tele:  ST    ASSESSMENT:  -  New Onset LV dysfunction, EF 30-35%  -  Severe MR by TTE  -  Recurrent Right Pleural Effusion  -  CAD  -  Essential Hypertension  -  LEZAMA Cirrhosis        PLAN:  -  73 yo CM with PMHx of CAD (stent 2008, patent stent 2015) with normal EF by cath at that time.  Presents with cough, orthopnea, SOA in setting of recurrent pleural effusion and LEZAMA cirrhosis.  ProBNP is WNL.  He is in NSR.  Echo shows severe MR, EF 30-35%.  LV Dysfunction by echo is new but overall, patient appears to be asymptomatic.  Repeat ProBNP is 153  -  Patient is currently asymptomatic; would not pursue ischemic evaluation at this time with multiple co morbidities.  -  Will proceed with DALE with Dr. Osorio, Wednesday  at 2:30 in CV.  -  Continue Toprol XL to 100 mg daily, Entresto 49-51 mg BID.  Lasix and Aldactone have been added by GI for effusions and hepatic hydrothorax.  -  Monitor I/Os, daily wts, nutrition.  -  Ambulate          I discussed the patient's findings and my recommendations with the patient, any present family members, and the nursing staff.  Yovani Valenzuela MD saw and examined patient, verified hx and PE, read all radiographic studies, reviewed labs and micro data, and formulated dx, plan for treatment and all medical decision making.      Milena Fleming PA-C  09/13/22, 09:24 EDT

## 2022-09-14 ENCOUNTER — APPOINTMENT (OUTPATIENT)
Dept: CARDIOLOGY | Facility: HOSPITAL | Age: 72
End: 2022-09-14

## 2022-09-14 LAB
ALBUMIN SERPL-MCNC: 3.2 G/DL (ref 3.5–5.2)
ALBUMIN/GLOB SERPL: 0.7 G/DL
ALP SERPL-CCNC: 172 U/L (ref 39–117)
ALT SERPL W P-5'-P-CCNC: 68 U/L (ref 1–41)
ANION GAP SERPL CALCULATED.3IONS-SCNC: 12 MMOL/L (ref 5–15)
AST SERPL-CCNC: 72 U/L (ref 1–40)
BILIRUB SERPL-MCNC: 0.3 MG/DL (ref 0–1.2)
BUN SERPL-MCNC: 18 MG/DL (ref 8–23)
BUN/CREAT SERPL: 15.3 (ref 7–25)
CALCIUM SPEC-SCNC: 9.2 MG/DL (ref 8.6–10.5)
CHLORIDE SERPL-SCNC: 101 MMOL/L (ref 98–107)
CO2 SERPL-SCNC: 24 MMOL/L (ref 22–29)
CREAT SERPL-MCNC: 1.18 MG/DL (ref 0.76–1.27)
DEPRECATED RDW RBC AUTO: 49.2 FL (ref 37–54)
EGFRCR SERPLBLD CKD-EPI 2021: 65.6 ML/MIN/1.73
ERYTHROCYTE [DISTWIDTH] IN BLOOD BY AUTOMATED COUNT: 15.7 % (ref 12.3–15.4)
GLOBULIN UR ELPH-MCNC: 4.8 GM/DL
GLUCOSE BLDC GLUCOMTR-MCNC: 172 MG/DL (ref 70–130)
GLUCOSE BLDC GLUCOMTR-MCNC: 202 MG/DL (ref 70–130)
GLUCOSE BLDC GLUCOMTR-MCNC: 212 MG/DL (ref 70–130)
GLUCOSE SERPL-MCNC: 215 MG/DL (ref 65–99)
HCT VFR BLD AUTO: 36 % (ref 37.5–51)
HGB BLD-MCNC: 11.6 G/DL (ref 13–17.7)
MCH RBC QN AUTO: 27.6 PG (ref 26.6–33)
MCHC RBC AUTO-ENTMCNC: 32.2 G/DL (ref 31.5–35.7)
MCV RBC AUTO: 85.7 FL (ref 79–97)
PLATELET # BLD AUTO: 118 10*3/MM3 (ref 140–450)
PMV BLD AUTO: 12.5 FL (ref 6–12)
POTASSIUM SERPL-SCNC: 4.1 MMOL/L (ref 3.5–5.2)
PROT SERPL-MCNC: 8 G/DL (ref 6–8.5)
RBC # BLD AUTO: 4.2 10*6/MM3 (ref 4.14–5.8)
SODIUM SERPL-SCNC: 137 MMOL/L (ref 136–145)
WBC NRBC COR # BLD: 5.86 10*3/MM3 (ref 3.4–10.8)

## 2022-09-14 PROCEDURE — 93321 DOPPLER ECHO F-UP/LMTD STD: CPT

## 2022-09-14 PROCEDURE — 80053 COMPREHEN METABOLIC PANEL: CPT | Performed by: INTERNAL MEDICINE

## 2022-09-14 PROCEDURE — 93325 DOPPLER ECHO COLOR FLOW MAPG: CPT

## 2022-09-14 PROCEDURE — 99232 SBSQ HOSP IP/OBS MODERATE 35: CPT | Performed by: PHYSICIAN ASSISTANT

## 2022-09-14 PROCEDURE — 63710000001 INSULIN DETEMIR PER 5 UNITS: Performed by: NURSE PRACTITIONER

## 2022-09-14 PROCEDURE — 82962 GLUCOSE BLOOD TEST: CPT

## 2022-09-14 PROCEDURE — B24BZZ4 ULTRASONOGRAPHY OF HEART WITH AORTA, TRANSESOPHAGEAL: ICD-10-PCS | Performed by: INTERNAL MEDICINE

## 2022-09-14 PROCEDURE — 25010000002 MIDAZOLAM PER 1 MG: Performed by: INTERNAL MEDICINE

## 2022-09-14 PROCEDURE — 85027 COMPLETE CBC AUTOMATED: CPT | Performed by: INTERNAL MEDICINE

## 2022-09-14 PROCEDURE — 93312 ECHO TRANSESOPHAGEAL: CPT

## 2022-09-14 PROCEDURE — 25010000002 FENTANYL CITRATE (PF) 50 MCG/ML SOLUTION: Performed by: INTERNAL MEDICINE

## 2022-09-14 RX ORDER — MIDAZOLAM HYDROCHLORIDE 1 MG/ML
2-8 INJECTION INTRAMUSCULAR; INTRAVENOUS ONCE AS NEEDED
Status: DISCONTINUED | OUTPATIENT
Start: 2022-09-14 | End: 2022-09-15 | Stop reason: HOSPADM

## 2022-09-14 RX ORDER — FENTANYL CITRATE 50 UG/ML
INJECTION, SOLUTION INTRAMUSCULAR; INTRAVENOUS
Status: COMPLETED | OUTPATIENT
Start: 2022-09-14 | End: 2022-09-14

## 2022-09-14 RX ORDER — NALOXONE HCL 0.4 MG/ML
0.4 VIAL (ML) INJECTION ONCE AS NEEDED
Status: DISCONTINUED | OUTPATIENT
Start: 2022-09-14 | End: 2022-09-15 | Stop reason: HOSPADM

## 2022-09-14 RX ORDER — FENTANYL CITRATE 50 UG/ML
50-100 INJECTION, SOLUTION INTRAMUSCULAR; INTRAVENOUS ONCE AS NEEDED
Status: DISCONTINUED | OUTPATIENT
Start: 2022-09-14 | End: 2022-09-15 | Stop reason: HOSPADM

## 2022-09-14 RX ORDER — PANTOPRAZOLE SODIUM 40 MG/1
40 TABLET, DELAYED RELEASE ORAL
Status: DISCONTINUED | OUTPATIENT
Start: 2022-09-15 | End: 2022-09-15 | Stop reason: HOSPADM

## 2022-09-14 RX ORDER — FLUMAZENIL 0.1 MG/ML
0.5 INJECTION INTRAVENOUS ONCE AS NEEDED
Status: DISCONTINUED | OUTPATIENT
Start: 2022-09-14 | End: 2022-09-15 | Stop reason: HOSPADM

## 2022-09-14 RX ORDER — INSULIN LISPRO 100 [IU]/ML
0-9 INJECTION, SOLUTION INTRAVENOUS; SUBCUTANEOUS
Status: DISCONTINUED | OUTPATIENT
Start: 2022-09-14 | End: 2022-09-15 | Stop reason: ALTCHOICE

## 2022-09-14 RX ORDER — MIDAZOLAM HYDROCHLORIDE 1 MG/ML
INJECTION INTRAMUSCULAR; INTRAVENOUS
Status: COMPLETED | OUTPATIENT
Start: 2022-09-14 | End: 2022-09-14

## 2022-09-14 RX ADMIN — SPIRONOLACTONE 100 MG: 25 TABLET ORAL at 10:19

## 2022-09-14 RX ADMIN — INSULIN DETEMIR 30 UNITS: 100 INJECTION, SOLUTION SUBCUTANEOUS at 10:28

## 2022-09-14 RX ADMIN — LACTULOSE 20 G: 10 SOLUTION ORAL at 10:22

## 2022-09-14 RX ADMIN — FUROSEMIDE 40 MG: 40 TABLET ORAL at 10:20

## 2022-09-14 RX ADMIN — SENNOSIDES AND DOCUSATE SODIUM 2 TABLET: 50; 8.6 TABLET ORAL at 10:21

## 2022-09-14 RX ADMIN — PANTOPRAZOLE SODIUM 40 MG: 40 TABLET, DELAYED RELEASE ORAL at 06:07

## 2022-09-14 RX ADMIN — EMPAGLIFLOZIN 10 MG: 10 TABLET, FILM COATED ORAL at 18:06

## 2022-09-14 RX ADMIN — Medication 10 ML: at 10:23

## 2022-09-14 RX ADMIN — METOPROLOL SUCCINATE 100 MG: 50 TABLET, EXTENDED RELEASE ORAL at 10:20

## 2022-09-14 RX ADMIN — LACTULOSE 20 G: 10 SOLUTION ORAL at 21:40

## 2022-09-14 RX ADMIN — VENLAFAXINE HYDROCHLORIDE 150 MG: 75 CAPSULE, EXTENDED RELEASE ORAL at 10:20

## 2022-09-14 RX ADMIN — MIDAZOLAM 2 MG: 1 INJECTION INTRAMUSCULAR; INTRAVENOUS at 14:40

## 2022-09-14 RX ADMIN — RIFAXIMIN 550 MG: 550 TABLET ORAL at 21:40

## 2022-09-14 RX ADMIN — LEVOTHYROXINE SODIUM 75 MCG: 0.07 TABLET ORAL at 06:07

## 2022-09-14 RX ADMIN — FENTANYL CITRATE 50 MCG: 50 INJECTION, SOLUTION INTRAMUSCULAR; INTRAVENOUS at 14:41

## 2022-09-14 RX ADMIN — Medication 10 ML: at 21:41

## 2022-09-14 RX ADMIN — RIFAXIMIN 550 MG: 550 TABLET ORAL at 10:19

## 2022-09-14 RX ADMIN — ATORVASTATIN CALCIUM 10 MG: 10 TABLET, FILM COATED ORAL at 21:41

## 2022-09-14 RX ADMIN — SACUBITRIL AND VALSARTAN 1 TABLET: 49; 51 TABLET, FILM COATED ORAL at 10:21

## 2022-09-14 RX ADMIN — SACUBITRIL AND VALSARTAN 1 TABLET: 49; 51 TABLET, FILM COATED ORAL at 21:41

## 2022-09-14 RX ADMIN — SENNOSIDES AND DOCUSATE SODIUM 2 TABLET: 50; 8.6 TABLET ORAL at 21:41

## 2022-09-14 NOTE — PLAN OF CARE
Goal Outcome Evaluation:            Pt resting in bed post saundra without complaint able to amb without assist vss cont to monitor

## 2022-09-14 NOTE — CASE MANAGEMENT/SOCIAL WORK
Continued Stay Note  [facility]     Patient Name: Gwyn Fuchs  MRN: [id number]  Today's Date: 9/14/2022    Admit Date: 9/5/2022     Discharge Plan     Row Name 09/14/22 1424       Plan    Plan Assisted Living with HH    Patient/Family in Agreement with Plan other (see comments)    Plan Comments The pt is off the floor for a DALE. CM will f/u.    Final Discharge Disposition Code 01 - home or self-care               Discharge Codes    No documentation.               Expected Discharge Date and Time     Expected Discharge Date Expected Discharge Time    Sep 15, 2022             Hortensia Ji RN

## 2022-09-14 NOTE — PROGRESS NOTES
[facility] Medicine Services  PROGRESS NOTE    Patient Name: Gwyn Fuchs  : 1950  MRN: [id number]    Date of Admission: 2022  Primary Care Physician: Riley Mckoy,     Subjective     CC: f/u recurrent pleural effusion, MV regurgitation     HPI:  Sitting in bed after DALE. He is feeling well and has no complaints. Says he needs to go to the bathroom. Awaiting final results from DALE and recommendations from cardiology. He is agreeable to staying overnight tonight.      ROS:  Gen- No fevers, chills  CV- No chest pain, palpitations  Resp- No cough, dyspnea  GI- No N/V/D, abd pain    Objective     Vital Signs:   Temp:  [97.5 °F (36.4 °C)-98.2 °F (36.8 °C)] 97.7 °F (36.5 °C)  Heart Rate:  [79-91] 80  Resp:  [16-18] 18  BP: (103-144)/(58-85) 130/83     Physical Exam:  Constitutional: No acute distress, awake, alert and conversant. Sitting upright in bed.   HENT: NCAT, mucous membranes moist  Respiratory: R lung base diminished without overt wheezes, rales or rhonchi. Normal respiratory effort on room air  Cardiovascular: RRR, no murmurs, rubs, or gallops  Gastrointestinal: Positive bowel sounds, soft, nontender, nondistended  Musculoskeletal: No bilateral ankle edema  Psychiatric: Appropriate affect, cooperative  Neurologic: Oriented x 3, moves all extremities spontaneously without focal deficits, speech clear    Results Reviewed:  LAB RESULTS:      Lab 22  0551 09/10/22  0502 22  0425   WBC 5.86 5.47 6.92   HEMOGLOBIN 11.6* 10.9* 11.3*   HEMATOCRIT 36.0* 33.3* 34.5*   PLATELETS 118* 99* 112*   NEUTROS ABS  --  3.70 4.71   IMMATURE GRANS (ABS)  --  0.03 0.05   LYMPHS ABS  --  0.74 0.92   MONOS ABS  --  0.73 0.89   EOS ABS  --  0.25 0.32   MCV 85.7 84.5 85.8         Lab 22  0542 22  0400 22  0328 09/10/22  0502 22  0425   SODIUM 137 135* 134* 135* 129*   POTASSIUM 4.1 4.1 4.0 4.0 3.7   CHLORIDE 101 99 99 101 94*   CO2 24.0 24.0 24.0 25.0 23.0    ANION GAP 12.0 12.0 11.0 9.0 12.0   BUN 18 20 25* 18 18   CREATININE 1.18 1.10 1.29* 1.11 1.32*   EGFR 65.6 71.3 58.9* 70.6 57.3*   GLUCOSE 215* 324* 329* 333* 323*   CALCIUM 9.2 8.7 8.5* 8.4* 8.4*         Lab 09/14/22  0542   TOTAL PROTEIN 8.0   ALBUMIN 3.20*   GLOBULIN 4.8   ALT (SGPT) 68*   AST (SGOT) 72*   BILIRUBIN 0.3   ALK PHOS 172*         Lab 09/09/22  0425   PROBNP 153.4     Brief Urine Lab Results     None        Microbiology Results Abnormal     Procedure Component Value - Date/Time    AFB Culture - Body Fluid, Pleural Cavity [[id number]] Collected: 09/06/22 1123    Lab Status: Preliminary result Specimen: Body Fluid from Pleural Cavity Updated: 09/13/22 1232     AFB Culture No AFB isolated at 1 week     AFB Stain No acid fast bacilli seen on concentrated smear    Fungus Culture - Body Fluid, Pleural Cavity [[id number]] Collected: 09/06/22 1123    Lab Status: Preliminary result Specimen: Body Fluid from Pleural Cavity Updated: 09/13/22 1232     Fungus Culture No fungus isolated at 1 week    Anaerobic Culture - Pleural Fluid, Pleural Cavity [[id number]] Collected: 09/06/22 1123    Lab Status: Final result Specimen: Pleural Fluid from Pleural Cavity Updated: 09/11/22 0551     Anaerobic Culture No anaerobes isolated at 5 days    Body Fluid Culture - Body Fluid, Pleural Cavity [[id number]] Collected: 09/06/22 1123    Lab Status: Final result Specimen: Body Fluid from Pleural Cavity Updated: 09/09/22 1012     Body Fluid Culture No growth at 3 days     Gram Stain Many (4+) WBCs seen      No organisms seen    Fungus Smear - Body Fluid, Pleural Cavity [[id number]] Collected: 09/06/22 1123    Lab Status: Final result Specimen: Body Fluid from Pleural Cavity Updated: 09/07/22 1518     Fungal Stain No yeast or hyphal elements seen    KOH Prep - Body Fluid, Pleural Cavity [[id number]] Collected: 09/06/22 1123    Lab Status: Final result Specimen: Body Fluid from Pleural Cavity Updated: 09/06/22 1250     KOH Prep No  yeast or hyphal elements seen        No radiology results from the last 24 hrs    Results for orders placed during the hospital encounter of 09/05/22    Adult Transthoracic Echo Complete W/ Cont if Necessary Per Protocol    Interpretation Summary  · Left ventricular wall thickness is consistent with mild concentric hypertrophy.  · Mildly reduced right ventricular systolic function noted.  · There is calcification of the aortic valve.  · Severe mitral valve regurgitation is present.  · Estimated right ventricular systolic pressure from tricuspid regurgitation is mildly elevated (35-45 mmHg). Calculated right ventricular systolic pressure from tricuspid regurgitation is 37 mmHg.  · There is a right pleural effusion.  · Global LV hypokinesis  · EF=30-35%    I have reviewed the medications:  Scheduled Meds:atorvastatin, 10 mg, Oral, Nightly  furosemide, 40 mg, Oral, Daily  insulin detemir, 30 Units, Subcutaneous, Q12H  insulin lispro, 0-14 Units, Subcutaneous, TID AC  lactulose, 20 g, Oral, BID  levothyroxine, 75 mcg, Oral, Q AM  metoprolol succinate XL, 100 mg, Oral, Q24H  [START ON 9/15/2022] pantoprazole, 40 mg, Oral, Daily  rifAXIMin, 550 mg, Oral, Q12H  sacubitril-valsartan, 1 tablet, Oral, Q12H  senna-docusate sodium, 2 tablet, Oral, BID  sodium chloride, 10 mL, Intravenous, Q12H  spironolactone, 100 mg, Oral, Daily  venlafaxine XR, 150 mg, Oral, Daily With Breakfast      Continuous Infusions:hold, 1 each      PRN Meds:.•  acetaminophen **OR** acetaminophen **OR** acetaminophen  •  senna-docusate sodium **AND** polyethylene glycol **AND** bisacodyl **AND** bisacodyl  •  dextrose  •  dextrose  •  fentaNYL citrate (PF)  •  flumazenil  •  glucagon (human recombinant)  •  hold  •  methohexital  •  midazolam  •  naloxone  •  ondansetron **OR** ondansetron  •  sodium chloride  •  sodium chloride    Assessment & Plan     Active Hospital Problems    Diagnosis  POA   • **Recurrent right pleural effusion [J90]  Unknown   •  Other cirrhosis of liver (HCC) [K74.69]  Yes   • Coronary artery disease involving native coronary artery of native heart without angina pectoris [I25.10]  Yes   • HTN (hypertension) [I10]  Yes   • Type 2 diabetes mellitus without complication, with long-term current use of insulin (HCC) [E11.9, Z79.4]  Not Applicable   • Impaired renal function [N28.9]  Yes      Resolved Hospital Problems   No resolved problems to display.     Brief Hospital Course to date:  Gwyn Fuchs is a 72 y.o. male with PMH significant for HTN, HLD, CAD (s/p stents), chronic systolic CHF, insulin-dependent DMII, CKD II and LEZAMA cirrhosis with chronic anemia/thrombocytopenia. Family moved him into assisted living earlier this year due to gradual memory / cognitive issues. He has been struggling with a 3+ month history of progressive orthopnea, exertional dyspnea, intermittent cough and recurrent R pleural effusion. PCP found a large R pleural effusion and referred him to pulmonology at [facility]. He underwent large-volume thoracentesis on 8/18 and 8/25 with removal of ~2L each time. Patient/daughter unclear of any formal diagnosis or laboratory studies being sent. Due to frustration regarding rapid fluid re-accumulation and lack of answers, he presented to [facility] ED on 9/5/22 for further evaluation.     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 Cytology benign  - 9/9 CXR showed re-accumulation. Improved on 9/12 CXR so repeat thoracentesis deferred  - ECHO showed LVEF 30-35% with with severe MR, LV dysfunction. DALE today  - GI has evaluated and do not recommend chest tube for the treatment of hepatic hydrothorax, as this can lead to massive protein-electrolyte depletion, infection, renal failure and  bleeding     Hepatic encephalopathy  LEZAMA cirrhosis w/ associated anemia/thrombocytopenia  - GI has followed. Recommend high-protein, low sodium (< 2g Na/day) diet, frequent high-protein snacks and high-protein bedtime snack  - Continue Lasix 40mg PO daily, Spironolactone 100mg daily and home Xifaxan, Lactulose and PPI  - AST/ALT elevated but stable. INR WNL  - Lactulose dose increased this admission. 3-4 soft BMs per day    - GI feels patient is a poor candidate for TIPS due to pulmonary hypertension and hepatic encephalopathy      Chronic systolic CHF, LVEF 30-35%  Severe mitral valve regurgitation  CAD s/p remote stenting / history of MI  Essential hypertension  Hyperlipidemia  - Cardiology following  - Continue Metoprolol XL 100mg PO daily, Entresto started this admission (watch potassium)  - Continue statin  - DALE today, results pending    Insulin-dependent DMII  - Hgb A1c 9.3%  - At home, on protamine-lispro 75/25 insulin. 30 units QAM, 25 units QHS in addition to Metformin 2000mg QHS  - Currently on Levemir 30 units BID + high-dose SSI with blood glucose 200-320 over past 24H  - In preparation for DC, will transition back to home 75/25 insulin 30 units BID. He appears to need increased insulin doses at DC given A1c  - Metformin is contraindicated with cirrhosis and should be stopped at DC.   - Will start Jardiance given his HFrEF     Probable CKD II  - Creatinine stable    Hypothyroidism, continue Levothyroxine     Expected Discharge Location and Transportation: back to [facility] via private vehicle   Expected Discharge Date: 9/15/22    DVT prophylaxis:Mechanical DVT prophylaxis orders are present.     AM-PAC 6 Clicks Score (PT): 24 (09/14/22 0800)    CODE STATUS:   Code Status and Medical Interventions:   Ordered at: 09/05/22 1622     Code Status (Patient has no pulse and is not breathing):    CPR (Attempt to Resuscitate)     Medical Interventions (Patient has pulse or is breathing):    Full Support     Roxy  GHISLAINE Nielsen PA-C  09/14/22

## 2022-09-14 NOTE — PROGRESS NOTES
Gwyn READ [facility]  1950  S519/1      Patient has been NPO p MN.  Scheduled for DALE this afternoon with Dr. Osorio for further evaluation of his Mitral Valve Disease/severe MR on TTE.  Risks and benefits have been reviewed with patient and his daughter.  Further recommendations based on outcomes.    Vitals:    09/14/22 0441   BP: 140/75   Pulse:    Resp: 16   Temp: 98.2 °F (36.8 °C)   SpO2:      CBC:  WBC 5.8, Hgb 11.6, HCT 36, Plt 118    CMP:  Glu 215, K 4.1, Na 137, BUN 18, Cr 1.18, ALT 68, AST 72, Albumin 3.2    NSR/ST on tele.    Milena Fleming PA-C

## 2022-09-15 ENCOUNTER — HOME HEALTH ADMISSION (OUTPATIENT)
Dept: HOME HEALTH SERVICES | Facility: HOME HEALTHCARE | Age: 72
End: 2022-09-15

## 2022-09-15 ENCOUNTER — READMISSION MANAGEMENT (OUTPATIENT)
Dept: CALL CENTER | Facility: HOSPITAL | Age: 72
End: 2022-09-15

## 2022-09-15 VITALS
HEIGHT: 71 IN | HEART RATE: 96 BPM | OXYGEN SATURATION: 97 % | DIASTOLIC BLOOD PRESSURE: 81 MMHG | TEMPERATURE: 97.4 F | WEIGHT: 167.11 LBS | SYSTOLIC BLOOD PRESSURE: 129 MMHG | BODY MASS INDEX: 23.4 KG/M2 | RESPIRATION RATE: 18 BRPM

## 2022-09-15 PROBLEM — I50.22 CHRONIC SYSTOLIC HEART FAILURE (HCC): Status: ACTIVE | Noted: 2022-09-15

## 2022-09-15 LAB
ANION GAP SERPL CALCULATED.3IONS-SCNC: 15 MMOL/L (ref 5–15)
BUN SERPL-MCNC: 17 MG/DL (ref 8–23)
BUN/CREAT SERPL: 13 (ref 7–25)
CALCIUM SPEC-SCNC: 9.2 MG/DL (ref 8.6–10.5)
CHLORIDE SERPL-SCNC: 98 MMOL/L (ref 98–107)
CO2 SERPL-SCNC: 25 MMOL/L (ref 22–29)
CREAT SERPL-MCNC: 1.31 MG/DL (ref 0.76–1.27)
DEPRECATED RDW RBC AUTO: 51.8 FL (ref 37–54)
EGFRCR SERPLBLD CKD-EPI 2021: 57.8 ML/MIN/1.73
ERYTHROCYTE [DISTWIDTH] IN BLOOD BY AUTOMATED COUNT: 15.8 % (ref 12.3–15.4)
GLUCOSE BLDC GLUCOMTR-MCNC: 105 MG/DL (ref 70–130)
GLUCOSE BLDC GLUCOMTR-MCNC: 294 MG/DL (ref 70–130)
GLUCOSE SERPL-MCNC: 151 MG/DL (ref 65–99)
HCT VFR BLD AUTO: 38.6 % (ref 37.5–51)
HGB BLD-MCNC: 12.1 G/DL (ref 13–17.7)
MAXIMAL PREDICTED HEART RATE: 148 BPM
MCH RBC QN AUTO: 27.9 PG (ref 26.6–33)
MCHC RBC AUTO-ENTMCNC: 31.3 G/DL (ref 31.5–35.7)
MCV RBC AUTO: 88.9 FL (ref 79–97)
PLATELET # BLD AUTO: 136 10*3/MM3 (ref 140–450)
PMV BLD AUTO: 12.9 FL (ref 6–12)
POTASSIUM SERPL-SCNC: 3.3 MMOL/L (ref 3.5–5.2)
RBC # BLD AUTO: 4.34 10*6/MM3 (ref 4.14–5.8)
SODIUM SERPL-SCNC: 138 MMOL/L (ref 136–145)
STRESS TARGET HR: 126 BPM
WBC NRBC COR # BLD: 5.54 10*3/MM3 (ref 3.4–10.8)

## 2022-09-15 PROCEDURE — 85027 COMPLETE CBC AUTOMATED: CPT | Performed by: INTERNAL MEDICINE

## 2022-09-15 PROCEDURE — 80048 BASIC METABOLIC PNL TOTAL CA: CPT | Performed by: INTERNAL MEDICINE

## 2022-09-15 PROCEDURE — 82962 GLUCOSE BLOOD TEST: CPT

## 2022-09-15 PROCEDURE — 99239 HOSP IP/OBS DSCHRG MGMT >30: CPT | Performed by: NURSE PRACTITIONER

## 2022-09-15 RX ORDER — METOPROLOL SUCCINATE 100 MG/1
100 TABLET, EXTENDED RELEASE ORAL
Qty: 30 TABLET | Refills: 11 | Status: SHIPPED | OUTPATIENT
Start: 2022-09-15 | End: 2022-09-15 | Stop reason: SDUPTHER

## 2022-09-15 RX ORDER — VENLAFAXINE HYDROCHLORIDE 150 MG/1
150 CAPSULE, EXTENDED RELEASE ORAL
Qty: 30 CAPSULE | Refills: 0 | Status: SHIPPED | OUTPATIENT
Start: 2022-09-16

## 2022-09-15 RX ORDER — POTASSIUM CHLORIDE 1.5 G/1.77G
40 POWDER, FOR SOLUTION ORAL AS NEEDED
Status: DISCONTINUED | OUTPATIENT
Start: 2022-09-15 | End: 2022-09-15 | Stop reason: HOSPADM

## 2022-09-15 RX ORDER — POTASSIUM CHLORIDE 750 MG/1
40 CAPSULE, EXTENDED RELEASE ORAL AS NEEDED
Status: DISCONTINUED | OUTPATIENT
Start: 2022-09-15 | End: 2022-09-15 | Stop reason: HOSPADM

## 2022-09-15 RX ORDER — SPIRONOLACTONE 100 MG/1
100 TABLET, FILM COATED ORAL DAILY
Qty: 30 TABLET | Refills: 0 | Status: SHIPPED | OUTPATIENT
Start: 2022-09-16 | End: 2022-09-15 | Stop reason: SDUPTHER

## 2022-09-15 RX ORDER — FUROSEMIDE 40 MG/1
40 TABLET ORAL DAILY
Qty: 30 TABLET | Refills: 0 | Status: SHIPPED | OUTPATIENT
Start: 2022-09-16 | End: 2022-09-15 | Stop reason: SDUPTHER

## 2022-09-15 RX ORDER — PANTOPRAZOLE SODIUM 40 MG/1
40 TABLET, DELAYED RELEASE ORAL
Qty: 30 TABLET | Refills: 0 | Status: SHIPPED | OUTPATIENT
Start: 2022-09-16 | End: 2022-09-15 | Stop reason: SDUPTHER

## 2022-09-15 RX ORDER — PANTOPRAZOLE SODIUM 40 MG/1
40 TABLET, DELAYED RELEASE ORAL
Qty: 30 TABLET | Refills: 0 | Status: SHIPPED | OUTPATIENT
Start: 2022-09-16

## 2022-09-15 RX ORDER — VENLAFAXINE HYDROCHLORIDE 150 MG/1
150 CAPSULE, EXTENDED RELEASE ORAL
Qty: 30 CAPSULE | Refills: 0 | Status: SHIPPED | OUTPATIENT
Start: 2022-09-16 | End: 2022-09-15 | Stop reason: SDUPTHER

## 2022-09-15 RX ORDER — FUROSEMIDE 40 MG/1
40 TABLET ORAL DAILY
Qty: 30 TABLET | Refills: 0 | Status: SHIPPED | OUTPATIENT
Start: 2022-09-16 | End: 2022-09-22 | Stop reason: HOSPADM

## 2022-09-15 RX ORDER — SPIRONOLACTONE 100 MG/1
100 TABLET, FILM COATED ORAL DAILY
Qty: 30 TABLET | Refills: 0 | Status: SHIPPED | OUTPATIENT
Start: 2022-09-16 | End: 2022-09-22 | Stop reason: HOSPADM

## 2022-09-15 RX ORDER — METOPROLOL SUCCINATE 100 MG/1
100 TABLET, EXTENDED RELEASE ORAL
Qty: 30 TABLET | Refills: 11 | Status: SHIPPED | OUTPATIENT
Start: 2022-09-15

## 2022-09-15 RX ADMIN — LACTULOSE 20 G: 10 SOLUTION ORAL at 12:04

## 2022-09-15 RX ADMIN — SPIRONOLACTONE 100 MG: 25 TABLET ORAL at 12:06

## 2022-09-15 RX ADMIN — SENNOSIDES AND DOCUSATE SODIUM 2 TABLET: 50; 8.6 TABLET ORAL at 12:06

## 2022-09-15 RX ADMIN — PANTOPRAZOLE SODIUM 40 MG: 40 TABLET, DELAYED RELEASE ORAL at 12:07

## 2022-09-15 RX ADMIN — SACUBITRIL AND VALSARTAN 1 TABLET: 49; 51 TABLET, FILM COATED ORAL at 12:08

## 2022-09-15 RX ADMIN — VENLAFAXINE HYDROCHLORIDE 150 MG: 75 CAPSULE, EXTENDED RELEASE ORAL at 12:06

## 2022-09-15 RX ADMIN — EMPAGLIFLOZIN 10 MG: 10 TABLET, FILM COATED ORAL at 12:07

## 2022-09-15 RX ADMIN — RIFAXIMIN 550 MG: 550 TABLET ORAL at 12:07

## 2022-09-15 RX ADMIN — LEVOTHYROXINE SODIUM 75 MCG: 0.07 TABLET ORAL at 06:38

## 2022-09-15 RX ADMIN — FUROSEMIDE 40 MG: 40 TABLET ORAL at 12:05

## 2022-09-15 RX ADMIN — METOPROLOL SUCCINATE 100 MG: 50 TABLET, EXTENDED RELEASE ORAL at 12:05

## 2022-09-15 RX ADMIN — Medication 10 ML: at 12:14

## 2022-09-15 NOTE — CASE MANAGEMENT/SOCIAL WORK
Case Management Discharge Note      Final Note: I spoke with Emmy from [facility]. The pt can return as he is ambulating, by report, independently. I faxed the DC summary to [phone]. I called the HH referal to Keara with [facility] as this agency is prefered at the facility. I spoke with the pt and his daughter Florence. They are in agreement with the DC plan. Daughter will transport.     Resides at [facility] [address]    Selected Continued Care - Admitted Since 9/5/2022     Destination    No services have been selected for the patient.              Durable Medical Equipment    No services have been selected for the patient.              Dialysis/Infusion    No services have been selected for the patient.              Home Medical Care Coordination complete.    Service Provider Selected Services Address Phone Fax Patient Preferred    [facility] ,  Home Rehabilitation [address], [address], [place] [id number] [phone] -- --          Therapy    No services have been selected for the patient.              Community Resources    No services have been selected for the patient.              Community & [facility]    No services have been selected for the patient.                       Final Discharge Disposition Code: 06 - home with home health care

## 2022-09-15 NOTE — PROGRESS NOTES
Met with patient and daughter they are agreeable to [facility]. Verified PCP. Call daughter Analisa for visits [phone]. Pt lives at [facility] their address is [address] [place] [address]. Sarah BARLOW, [facility]-Liaison

## 2022-09-15 NOTE — CASE MANAGEMENT/SOCIAL WORK
Case Management Discharge Note      Final Note: Keara with [facility] called back and they are unable to accept the pt due to staffing. I called the referral to Sarah with [facility]-they can accept the pt.         Selected Continued Care - Admitted Since 9/5/2022     Destination    No services have been selected for the patient.              Durable Medical Equipment    No services have been selected for the patient.              Dialysis/Infusion    No services have been selected for the patient.              Home Medical Care Coordination complete.    Service Provider Selected Services Address Phone Fax Patient Preferred    [facility]  Home Health Services [address] [facility] [address] [phone] --          Therapy    No services have been selected for the patient.              Community Resources    No services have been selected for the patient.              Community & DME    No services have been selected for the patient.                       Final Discharge Disposition Code: 06 - home with home health care

## 2022-09-15 NOTE — DISCHARGE PLACEMENT REQUEST
"SilverioSylvia pinzon (72 y.o. Male)   To [facility]  From Hortensia Ji [phone]            Date of Birth   1950    Social Security Number       Address   [address] [place] [address]    Home Phone   [phone]    MRN   [id number]       Congregation   None    Marital Status                               Admission Date   22    Admission Type   Emergency    Admitting Provider   Aram Howell DO    Attending Provider   Aram Howell DO    Department, Room/Bed   [address], S519/1       Discharge Date       Discharge Disposition   Home or Self Care    Discharge Destination                               Attending Provider: Aram Howell DO    Allergies: Penicillins    Isolation: None   Infection: None   Code Status: CPR   Advance Care Planning Activity    Ht: 180.3 cm (70.98\")   Wt: 75.8 kg (167 lb 1.7 oz)    Admission Cmt: None   Principal Problem: Recurrent right pleural effusion [J90]                 Active Insurance as of 2022     Primary Coverage     Payor Plan Insurance Group Employer/Plan Group    ANTH MEDICARE REPLACEMENT ANTH MEDICARE ADVANTAGE [id number]     Payor Plan Address Payor Plan Phone Number Payor Plan Fax Number Effective Dates    [address] [phone]  2016 - None Entered    [facility] [id number]       Subscriber Name Subscriber Birth Date Member ID       SYLVIA FUCHS 1950 [id number]                 Emergency Contacts      (Rel.) Home Phone Work Phone Mobile Phone    CATRACHITO HARP (Daughter) [phone] -- --    ALEKSANDRA FUCHS (Daughter) [phone] -- --    JAZIEL FUCHS (Brother) [phone] -- --                 Discharge Summary      Marie Perez APRN at 09/15/22 1322              [facility] Medicine Services  DISCHARGE SUMMARY    Patient Name: Sylvia Fuchs  : 1950  MRN: [id number]    Date of Admission: 2022 11:34 AM  Date of Discharge:  9/15/2022  Primary Care " Bedside and Verbal shift change report given to Vinayak Betts (oncoming nurse) by [employer] (offgoing nurse). Report included the following information SBAR, Kardex and MAR. Physician: Riley Mckoy DO    Consults     Date and Time Order Name Status Description    9/7/2022 10:59 AM Inpatient Cardiology Consult Completed     9/6/2022  2:30 PM Inpatient Gastroenterology Consult Completed           Hospital Course     Presenting Problem:   Recurrent right pleural effusion [J90]    Active Hospital Problems    Diagnosis  POA   • **Recurrent right pleural effusion [J90]  Unknown   • Other cirrhosis of liver (HCC) [K74.69]  Yes   • Coronary artery disease involving native coronary artery of native heart without angina pectoris [I25.10]  Yes   • HTN (hypertension) [I10]  Yes   • Type 2 diabetes mellitus without complication, with long-term current use of insulin (HCC) [E11.9, Z79.4]  Not Applicable   • Impaired renal function [N28.9]  Yes      Resolved Hospital Problems   No resolved problems to display.          Hospital Course:  Gwyn Fuchs is a 72 y.o. male  with PMH significant for HTN, HLD, CAD (s/p stents), chronic systolic CHF, insulin-dependent DMII, CKD II and LEZAMA cirrhosis with chronic anemia/thrombocytopenia. Family moved him into assisted living earlier this year due to gradual memory / cognitive issues. He has been struggling with a 3+ month history of progressive orthopnea, exertional dyspnea, intermittent cough and recurrent R pleural effusion. PCP found a large R pleural effusion and referred him to pulmonology at [place]. He underwent large-volume thoracentesis on 8/18 and 8/25 with removal of ~2L each time. Patient/daughter unclear of any formal diagnosis or laboratory studies being sent. Due to frustration regarding rapid fluid re-accumulation and lack of answers, he presented to [facility] ED on 9/5/22 for further evaluation.      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 Cytology benign  - 9/9 CXR showed re-accumulation. Improved on 9/12 CXR so repeat thoracentesis deferred  - ECHO showed LVEF 30-35% with with severe MR, LV dysfunction. DALE performed on 9/14- global HK with EF 35%, Mod MR, mild AI. Plan to discharge home on current cardiace medications and follow up with Dr. Osorio in 6-8 weeks.   - GI has evaluated and do not recommend chest tube for the treatment of hepatic hydrothorax, as this can lead to massive protein-electrolyte depletion, infection, renal failure and bleeding.  -Currently on room air and oxygenating well.      Hepatic encephalopathy  LEZAMA cirrhosis w/ associated anemia/thrombocytopenia  - GI has followed. Recommend high-protein, low sodium (< 2g Na/day) diet, frequent high-protein snacks and high-protein bedtime snack.  - Continue Lasix 40mg PO daily, Spironolactone 100mg daily and home Xifaxan, Lactulose and PPI  - AST/ALT elevated but stable. INR WNL  - Lactulose dose increased this admission. 3-4 soft BMs per day    - GI feels patient is a poor candidate for TIPS due to pulmonary hypertension and hepatic encephalopathy   - Plan is to follow up with [facility] GI in 3 weeks.      Chronic systolic CHF, LVEF 30-35%  Severe mitral valve regurgitation  CAD s/p remote stenting / history of MI  Essential hypertension  Hyperlipidemia  - Cardiology following  - Continue Metoprolol XL 100mg PO daily, Entresto started this admission (watch potassium)  - Continue statin  -DALE on 9/14- Global HK with EF 35%, Mod MR, Mild AI      Insulin-dependent DMII  - Hgb A1c 9.3%  - At home, on protamine-lispro 75/25 insulin. 30 units QAM, 25 units QHS in addition to Metformin 2000mg QHS  - Currently on Levemir 30 units BID + high-dose SSI with blood glucose 200-320 over past 24H  - In preparation for DC, will transition back to home 75/25 insulin 30 units BID. He appears to need increased insulin doses at DC given A1c  - Metformin is contraindicated with  cirrhosis and should be stopped at DC.   - Will start Jardiance given his HFrEF     Probable CKD II  - Creatinine stable     Hypothyroidism, continue Levothyroxine       Discharge Follow Up Recommendations for outpatient labs/diagnostics:   Follow up with PCP, first available hospital follow up appt.   Follow up with cardiology in 6-8 weeks  Follow up with GI in 3 weeks.       Day of Discharge     HPI:   Up on side of bed eating lunch. Feels well today. No shortness of air or chest pain. Doing well on room air. Eager to go home.     Review of Systems  Gen- No fevers, chills  CV- No chest pain, palpitations  Resp- No cough, dyspnea  GI- No N/V/D, abd pain    Vital Signs:   Temp:  [96.4 °F (35.8 °C)-97.7 °F (36.5 °C)] 97.4 °F (36.3 °C)  Heart Rate:  [75-96] 96  Resp:  [16-18] 18  BP: (113-155)/(61-89) 129/81      Physical Exam:  Constitutional: No acute distress, awake, alert, up on side of bed, no family at bedside.  HENT: NCAT, mucous membranes moist  Respiratory: Diminished right lung base, respiratory effort normal on room air  Cardiovascular: RRR, no murmurs, rubs, or gallops  Gastrointestinal: Positive bowel sounds, soft, nontender, nondistended  Musculoskeletal: No bilateral ankle edema  Psychiatric: Appropriate affect, cooperative  Neurologic: Oriented x 3, strength symmetric in all extremities, Cranial Nerves grossly intact to confrontation, speech clear  Skin: No rashes noted to exposed skin       Pertinent  and/or Most Recent Results     LAB RESULTS:      Lab 09/15/22  0423 09/14/22  0551 09/10/22  0502 09/09/22  0425   WBC 5.54 5.86 5.47 6.92   HEMOGLOBIN 12.1* 11.6* 10.9* 11.3*   HEMATOCRIT 38.6 36.0* 33.3* 34.5*   PLATELETS 136* 118* 99* 112*   NEUTROS ABS  --   --  3.70 4.71   IMMATURE GRANS (ABS)  --   --  0.03 0.05   LYMPHS ABS  --   --  0.74 0.92   MONOS ABS  --   --  0.73 0.89   EOS ABS  --   --  0.25 0.32   MCV 88.9 85.7 84.5 85.8         Lab 09/15/22  0423 09/14/22  0542 09/13/22  0400  09/12/22  0328 09/10/22  0502   SODIUM 138 137 135* 134* 135*   POTASSIUM 3.3* 4.1 4.1 4.0 4.0   CHLORIDE 98 101 99 99 101   CO2 25.0 24.0 24.0 24.0 25.0   ANION GAP 15.0 12.0 12.0 11.0 9.0   BUN 17 18 20 25* 18   CREATININE 1.31* 1.18 1.10 1.29* 1.11   EGFR 57.8* 65.6 71.3 58.9* 70.6   GLUCOSE 151* 215* 324* 329* 333*   CALCIUM 9.2 9.2 8.7 8.5* 8.4*         Lab 09/14/22  0542   TOTAL PROTEIN 8.0   ALBUMIN 3.20*   GLOBULIN 4.8   ALT (SGPT) 68*   AST (SGOT) 72*   BILIRUBIN 0.3   ALK PHOS 172*         Lab 09/09/22  0425   PROBNP 153.4                 Brief Urine Lab Results     None        Microbiology Results (last 10 days)     Procedure Component Value - Date/Time    AFB Culture - Body Fluid, Pleural Cavity [[id number]] Collected: 09/06/22 1123    Lab Status: Preliminary result Specimen: Body Fluid from Pleural Cavity Updated: 09/13/22 1232     AFB Culture No AFB isolated at 1 week     AFB Stain No acid fast bacilli seen on concentrated smear    Body Fluid Culture - Body Fluid, Pleural Cavity [[id number]] Collected: 09/06/22 1123    Lab Status: Final result Specimen: Body Fluid from Pleural Cavity Updated: 09/09/22 1012     Body Fluid Culture No growth at 3 days     Gram Stain Many (4+) WBCs seen      No organisms seen    Anaerobic Culture - Pleural Fluid, Pleural Cavity [[id number]] Collected: 09/06/22 1123    Lab Status: Final result Specimen: Pleural Fluid from Pleural Cavity Updated: 09/11/22 0551     Anaerobic Culture No anaerobes isolated at 5 days    Fungus Culture - Body Fluid, Pleural Cavity [[id number]] Collected: 09/06/22 1123    Lab Status: Preliminary result Specimen: Body Fluid from Pleural Cavity Updated: 09/13/22 1232     Fungus Culture No fungus isolated at 1 week    KOH Prep - Body Fluid, Pleural Cavity [[id number]] Collected: 09/06/22 1123    Lab Status: Final result Specimen: Body Fluid from Pleural Cavity Updated: 09/06/22 1250     KOH Prep No yeast or hyphal elements seen    Fungus Smear - Body  Fluid, Pleural Cavity [[id number]] Collected: 09/06/22 1123    Lab Status: Final result Specimen: Body Fluid from Pleural Cavity Updated: 09/07/22 1518     Fungal Stain No yeast or hyphal elements seen          Adult Transesophageal Echo (DALE) W/ Cont if Necessary Per Protocol    Result Date: 9/15/2022  · The right ventricular cavity is small in size. · Global hyokinesis with EF 35% · Moderate MR · Mild AI      Adult Transthoracic Echo Complete W/ Cont if Necessary Per Protocol    Result Date: 9/6/2022  · Left ventricular wall thickness is consistent with mild concentric hypertrophy. · Mildly reduced right ventricular systolic function noted. · There is calcification of the aortic valve. · Severe mitral valve regurgitation is present. · Estimated right ventricular systolic pressure from tricuspid regurgitation is mildly elevated (35-45 mmHg). Calculated right ventricular systolic pressure from tricuspid regurgitation is 37 mmHg. · There is a right pleural effusion. · Global LV hypokinesis · EF=30-35%      CT Abdomen Pelvis With & Without Contrast    Result Date: 9/8/2022  DATE OF EXAM: 9/7/2022 10:11 AM  PROCEDURE: CT ABDOMEN PELVIS W WO CONTRAST-  INDICATIONS: Ascites; A14-Gcdvjfe effusion, not elsewhere classified; R06.02-Shortness of breath  COMPARISON: No comparisons available.  TECHNIQUE: Routine transaxial slices were obtained through the abdomen and pelvis without the administration of intravenous contrast. Additional scanning through the abdomen and pelvis followed by the intravenous administration of 95 mL of Isovue 300. Reconstructed coronal and sagittal images were also obtained. Automated exposure control and iterative construction methods were used.  The radiation dose reduction device was turned on for each scan per the ALARA (As Low as Reasonably Achievable) protocol.  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  Images of the lung bases show a large free-flowing right  pleural effusion and no left effusion. Unenhanced images of the liver show diffuse nodularity consistent with cirrhosis. No abnormal arterial phase enhancement is seen. Portal venous phase and delayed venous phase images likewise show uniform, normal liver parenchymal enhancement with no evidence of mass. There is no evidence of biliary ductal dilatation or intrahepatic mass effect to suggest underlying, occult mass. The portal veins, splenic vein, and superior mesenteric veins all enhance normally with contrast. Hepatic veins enhance normally as well. Intrahepatic IVC remains small in caliber as seen on axial image 28 series 6.  Elsewhere, the spleen is in the upper range of normal size at 13 cm in length but not considered pathologically enlarged. Pancreas, gallbladder, adrenal glands, and kidneys appear within normal limits. No upper abdominal free air ascites or adenopathy is appreciated. Large and small bowel loops are normal in caliber and normal in appearance except for questionable ascending and transverse colon mucosal thickening. In part this may be due to nondistention, but some actual mucosal thickening is favored, at least to the ascending colon. Ileocecal valve and terminal ileum appear grossly normal. Appendix is not identified. There is mild pelvic ascites. No mass or adenopathy is seen. Bladder is normally distended and normal in appearance.  Delayed venous phase images show no evidence of obstructive uropathy. Bony structures appear to be intact.       1. Large, free-flowing right pleural effusion, potentially hepatic hydrothorax. No left effusion. 2. Liver morphology consistent with cirrhosis. Otherwise uniform appearance of liver with no evidence of liver mass or ductal dilatation. 3. Mild ascites. 4. Mildly thick-walled appearance of the ascending and transverse colon, perhaps in part due to nondistention. Mild active colonic inflammation is favored. Please correlate with any GI symptoms.  This  report was finalized on 9/8/2022 10:09 AM by Dr. Anthony Garcia MD.      XR Chest 1 View    Result Date: 9/12/2022  Examination: XR CHEST 1 VW-  Date of Exam: 9/12/2022 7:58 AM  Indication: eval effusion; A84-Evuymwo effusion, not elsewhere classified; R06.02-Shortness of breath.  Comparison: Radiograph 09/09/2022, 09/06/2022  Technique: 1 view of the chest  Findings: Mild patchy airspace changes present within the right lung base. Small right-sided pleural effusion present. No pneumothorax. The heart size is normal. The pulmonary vasculature appears within normal limits. No acute osseous abnormality identified.      Mild patchy airspace changes present within the right lung base with small right-sided pleural effusion, improved as compared to the previous study.  This report was finalized on 9/12/2022 8:16 AM by Jennifer Robert MD.      XR Chest 1 View    Result Date: 9/9/2022  DATE OF EXAM: 9/9/2022 1:11 AM  PROCEDURE: XR CHEST 1 VW-  INDICATIONS: pleural effusion, cough; X58-Vcpresv effusion, not elsewhere classified; R06.02-Shortness of breath  COMPARISON: 9/6/2022  TECHNIQUE: Single radiographic AP view of the chest was obtained.  FINDINGS: The heart size is normal and the left lung remains clear. There is an increase in density diffusely in the right hemithorax representing a combination of pleural fluid and atelectasis. The pulmonary vascular markings are normal.      Increasing pleural fluid and increasing atelectasis in the right lung.  This report was finalized on 9/9/2022 7:09 AM by Haroldo Arciniega MD.      XR Chest 1 View    Result Date: 9/6/2022  DATE OF EXAM: 9/6/2022 11:11 AM  PROCEDURE: XR CHEST 1 VW-  INDICATIONS: thora; E69-Nzdoahf effusion, not elsewhere classified; R06.02-Shortness of breath  COMPARISON: 9/6/2022 8:20 AM  TECHNIQUE: Single radiographic AP view of the chest was obtained.  FINDINGS: Unchanged cardiac silhouette. Interval decrease in now small right pleural effusion. Right basilar opacity  which may represent atelectasis. No evidence of pneumothorax. Left lung is clear. No acute osseous abnormality. Degenerative changes of the acromioclavicular joints.      Interval decrease in now small right pleural effusion with associated right basilar opacity which may represent atelectasis. No evidence of pneumothorax.  This report was finalized on 9/6/2022 11:41 AM by Austin Perez.      XR Chest 1 View    Result Date: 9/6/2022  DATE OF EXAM: 9/6/2022 8:28 AM  PROCEDURE: XR CHEST 1 VW-  INDICATIONS: evaluate effusion; G77-Yreaswv effusion, not elsewhere classified; R06.02-Shortness of breath  COMPARISON: One day prior  TECHNIQUE: Single radiographic AP view of the chest was obtained.  FINDINGS: There is unchanged dense opacity at the right lung base, favoring component of unchanged moderate to large effusion and volume loss. Some component of elevation of the hemidiaphragm is possible, difficult to evaluate in the absence of more remote comparison exams. The left lung remains clear. There is no pneumothorax. Unchanged mild cardiac enlargement.      There is unchanged dense opacity at the right lung base, favoring component of unchanged moderate to large effusion and volume loss. Some component of elevation of the hemidiaphragm is possible, difficult to evaluate in the absence of more remote comparison exams. The left lung remains clear. There is no pneumothorax. Unchanged mild cardiac enlargement.  This report was finalized on 9/6/2022 9:01 AM by Chuy Hollingsworth.      XR Chest 1 View    Result Date: 9/5/2022   DATE OF EXAM: 9/5/2022 11:45 AM  PROCEDURE: XR CHEST 1 VW-  INDICATIONS: SOA triage protocol  COMPARISON: No Comparisons Available  TECHNIQUE: Portable Chest  FINDINGS:  Study is limited by overlying support and monitoring apparatus  There is a large right-sided pleural effusion suspected. Vascular crowding and dependent atelectasis noted at the right base. Fluid is noted within the fissure. Left lung  appears grossly clear. Attenuation heart size is limited by pleural effusion. Mediastinum appears grossly unremarkable on the left. Osseous structures demonstrate no acute abnormality      IMPRESSION : Large right-sided pleural effusion with associated vascular crowding and dependent atelectasis on the right.  Left lung appears grossly clear[  This report was finalized on 9/5/2022 12:14 PM by Godwin Huston.      US Thoracentesis    Result Date: 9/6/2022  US THORACENTESIS-                                                         History: US THORACENTESIS-                                                : Jerry Morin MD.  Assistant:  None.                                                                             Modality: Ultrasound                                                                                                          Sedation: None.  Anesthesia: Lidocaine, local infiltration.           Estimated blood loss:  < 5 cc.          Technique: Discussion of risks, benefits, and alternatives were made with the patient. The patient expressed understanding and agreed to proceed. Informed written consent was obtained. A universal timeout was performed prior to starting the procedure. Maximal sterile precautions were utilized. The procedure room personnel used personal protective equipment. The operators additionally used sterile surgical gloves.  A preliminary ultrasonography was performed. It showed a pleural effusion. A low intercostal access site was selected for access. Pertinent ultrasound images were stored to the PACS for documentation. The patient position was optimized for the performance of thoracentesis. The access site was sterilely prepped and draped. Local anesthesia was administered. A catheter over the needle system was advanced into the pleura. Straw colored fluid was aspirated and the plastic catheter advanced into the pleural space. The catheter was then connected to a  fluid recovery system.  Endpoint of thoracentesis: Incessant coughing  At the end of the procedure, the catheter was withdrawn and an aseptic dressing applied. The patient tolerated the procedure well.  Postprocedure chest x-ray is pending.  After uneventful recovery recovery, the patient was discharged from the department in stable condition.           Complications: None immediate.  Specimen: The specimen was labeled and sent to the lab in appropriate carriers & containers if such was requested by the ordering provider..                                                                  Impression:   Successful ultrasound-guided thoracentesis of right pleural effusion with recovery of 2 liters of pleural fluid.            We stopped early because of the reason mentioned above, we can safely perform another thoracentesis in the near future. The patient should be evaluated for hepatic hydrothorax.  Thank you for the opportunity to assist in the care of your patient.  This report was finalized on 9/6/2022 11:12 AM by Jerry Morin MD.                Results for orders placed during the hospital encounter of 09/05/22    Adult Transesophageal Echo (DALE) W/ Cont if Necessary Per Protocol    Interpretation Summary  · The right ventricular cavity is small in size.  · Global hyokinesis with EF 35%  · Moderate MR  · Mild AI      Plan for Follow-up of Pending Labs/Results:   Pending Labs     Order Current Status    AFB Culture - Body Fluid, Pleural Cavity Preliminary result    Fungus Culture - Body Fluid, Pleural Cavity Preliminary result        Discharge Details        Discharge Medications      New Medications      Instructions Start Date   empagliflozin 10 MG tablet tablet  Commonly known as: JARDIANCE   10 mg, Oral, Daily   Start Date: September 16, 2022     furosemide 40 MG tablet  Commonly known as: LASIX   40 mg, Oral, Daily   Start Date: September 16, 2022     pantoprazole 40 MG EC tablet  Commonly known as:  PROTONIX   40 mg, Oral, Daily at 1100   Start Date: September 16, 2022     sacubitril-valsartan 49-51 MG tablet  Commonly known as: ENTRESTO   1 tablet, Oral, Every 12 Hours Scheduled      spironolactone 100 MG tablet  Commonly known as: ALDACTONE   100 mg, Oral, Daily   Start Date: September 16, 2022        Changes to Medications      Instructions Start Date   metoprolol succinate  MG 24 hr tablet  Commonly known as: TOPROL-XL  What changed:   · medication strength  · how much to take  · when to take this   100 mg, Oral, Every 24 Hours Scheduled      venlafaxine  MG 24 hr capsule  Commonly known as: EFFEXOR-XR  What changed:   · medication strength  · how much to take  · when to take this   150 mg, Oral, Daily With Breakfast   Start Date: September 16, 2022        Continue These Medications      Instructions Start Date   insulin lispro protamine-insulin lispro (75-25) 100 UNIT/ML suspension injection  Commonly known as: humaLOG 75-25   30 Units, Subcutaneous, Every Morning      insulin lispro protamine-insulin lispro (75-25) 100 UNIT/ML suspension injection  Commonly known as: humaLOG 75-25   25 Units, Subcutaneous, Nightly      lactulose 10 GM/15ML solution  Commonly known as: CHRONULAC   20 g, Oral, 3 Times Daily      levothyroxine 75 MCG tablet  Commonly known as: SYNTHROID, LEVOTHROID   75 mcg, Oral, Daily      metFORMIN 1000 MG tablet  Commonly known as: GLUCOPHAGE   2,000 mg, Oral, Every Evening      rifAXIMin 200 MG tablet  Commonly known as: XIFAXAN   200 mg, Oral, 2 Times Daily      simvastatin 40 MG tablet  Commonly known as: ZOCOR   40 mg, Oral, Nightly             Allergies   Allergen Reactions   • Penicillins          Discharge Disposition:  Home or Self Care    Diet:  Hospital:  Diet Order   Procedures   • Diet Regular; Thin; Cardiac, Low Sodium, Consistent Carbohydrate; 2,000 mg Na       Activity:  Activity Instructions     Activity as Tolerated            CODE STATUS:    Code Status and  Medical Interventions:   Ordered at: 09/05/22 1624     Code Status (Patient has no pulse and is not breathing):    CPR (Attempt to Resuscitate)     Medical Interventions (Patient has pulse or is breathing):    Full Support       No future appointments.    Additional Instructions for the Follow-ups that You Need to Schedule     Discharge Follow-up with PCP   As directed       Currently Documented PCP:    Riley Mckoy DO    PCP Phone Number:    [phone]     Follow Up Details: first available hospital follow up appt         Discharge Follow-up with Specified Provider: [facility]; 3 Weeks   As directed      To: [facility]    Follow Up: 3 Weeks         Discharge Follow-up with Specified Provider: Dr. Osorio   As directed      To: Dr. Osorio    Follow Up Details: 6-8 weeks                     STEPHANIE Browne  09/15/22      Time Spent on Discharge:  I spent  42  minutes on this discharge activity which included: face-to-face encounter with the patient, reviewing the data in the system, coordination of the care with the nursing staff as well as consultants, documentation, and entering orders.            Electronically signed by Marie Perez APRN at 09/15/22 [id number]

## 2022-09-15 NOTE — PLAN OF CARE
Goal Outcome Evaluation:  Plan of Care Reviewed With: patient, family      No significant changes this shift. Up with standby assistance.  Patient is hopeful to be discharged soon.  Rested fairly well throughout the night; voices no complaints/concerns at this time. VSS.    Progress: improving

## 2022-09-15 NOTE — PROGRESS NOTES
Gwyn READ [facility]  1950  S519/1    DALE performed yesterday:  Global HK with EF 35%, Mod MR, Mild AI.    Okay for discharge from cardiology standpoint.    Will have patient follow-up with Dr. Osorio in 6 to 8 weeks.  Recommend discharge on the following cardiac medications: Zocor 40 mg nightly, Lasix 40 mg daily (prescribed by GI), Toprol- mg daily, Entresto 49-51 mg twice daily, Aldactone 100 mg daily (prescribed by GI.    Milena Fleming PA-C

## 2022-09-15 NOTE — DISCHARGE SUMMARY
[facility] Medicine Services  DISCHARGE SUMMARY    Patient Name: Gwyn Fuchs  : 1950  MRN: [id number]    Date of Admission: 2022 11:34 AM  Date of Discharge:  9/15/2022  Primary Care Physician: Riley Mckoy,     Consults     Date and Time Order Name Status Description    2022 10:59 AM Inpatient Cardiology Consult Completed     2022  2:30 PM Inpatient Gastroenterology Consult Completed           Hospital Course     Presenting Problem:   Recurrent right pleural effusion [J90]    Active Hospital Problems    Diagnosis  POA   • **Recurrent right pleural effusion [J90]  Unknown   • Other cirrhosis of liver (HCC) [K74.69]  Yes   • Coronary artery disease involving native coronary artery of native heart without angina pectoris [I25.10]  Yes   • HTN (hypertension) [I10]  Yes   • Type 2 diabetes mellitus without complication, with long-term current use of insulin (HCC) [E11.9, Z79.4]  Not Applicable   • Impaired renal function [N28.9]  Yes      Resolved Hospital Problems   No resolved problems to display.          Hospital Course:  Gwyn Fuchs is a 72 y.o. male  with PMH significant for HTN, HLD, CAD (s/p stents), chronic systolic CHF, insulin-dependent DMII, CKD II and LEZAMA cirrhosis with chronic anemia/thrombocytopenia. Family moved him into assisted living earlier this year due to gradual memory / cognitive issues. He has been struggling with a 3+ month history of progressive orthopnea, exertional dyspnea, intermittent cough and recurrent R pleural effusion. PCP found a large R pleural effusion and referred him to pulmonology at [facility]. He underwent large-volume thoracentesis on  and  with removal of ~2L each time. Patient/daughter unclear of any formal diagnosis or laboratory studies being sent. Due to frustration regarding rapid fluid re-accumulation and lack of answers, he presented to [facility] on 22 for further evaluation.       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 Cytology benign  - 9/9 CXR showed re-accumulation. Improved on 9/12 CXR so repeat thoracentesis deferred  - ECHO showed LVEF 30-35% with with severe MR, LV dysfunction. DALE performed on 9/14- global HK with EF 35%, Mod MR, mild AI. Plan to discharge home on current cardiace medications and follow up with Dr. Osorio in 6-8 weeks.   - GI has evaluated and do not recommend chest tube for the treatment of hepatic hydrothorax, as this can lead to massive protein-electrolyte depletion, infection, renal failure and bleeding.  -Currently on room air and oxygenating well.      Hepatic encephalopathy  LEZAMA cirrhosis w/ associated anemia/thrombocytopenia  - GI has followed. Recommend high-protein, low sodium (< 2g Na/day) diet, frequent high-protein snacks and high-protein bedtime snack.  - Continue Lasix 40mg PO daily, Spironolactone 100mg daily and home Xifaxan, Lactulose and PPI  - AST/ALT elevated but stable. INR WNL  - Lactulose dose increased this admission. 3-4 soft BMs per day    - GI feels patient is a poor candidate for TIPS due to pulmonary hypertension and hepatic encephalopathy   - Plan is to follow up with [facility] in 3 weeks.      Chronic systolic CHF, LVEF 30-35%  Severe mitral valve regurgitation  CAD s/p remote stenting / history of MI  Essential hypertension  Hyperlipidemia  - Cardiology following  - Continue Metoprolol XL 100mg PO daily, Entresto started this admission (watch potassium)  - Continue statin  -DALE on 9/14- Global HK with EF 35%, Mod MR, Mild AI      Insulin-dependent DMII  - Hgb A1c 9.3%  - At home, on protamine-lispro 75/25 insulin. 30 units QAM, 25 units QHS in addition to Metformin 2000mg QHS  - Currently on Levemir 30 units BID +  high-dose SSI with blood glucose 200-320 over past 24H  - In preparation for DC, will transition back to home 75/25 insulin 30 units BID. He appears to need increased insulin doses at DC given A1c  - Metformin is contraindicated with cirrhosis and should be stopped at DC.   - Will start Jardiance given his HFrEF     Probable CKD II  - Creatinine stable     Hypothyroidism, continue Levothyroxine       Discharge Follow Up Recommendations for outpatient labs/diagnostics:   Follow up with PCP, first available hospital follow up appt.   Follow up with cardiology in 6-8 weeks  Follow up with GI in 3 weeks.       Day of Discharge     HPI:   Up on side of bed eating lunch. Feels well today. No shortness of air or chest pain. Doing well on room air. Eager to go home.     Review of Systems  Gen- No fevers, chills  CV- No chest pain, palpitations  Resp- No cough, dyspnea  GI- No N/V/D, abd pain    Vital Signs:   Temp:  [96.4 °F (35.8 °C)-97.7 °F (36.5 °C)] 97.4 °F (36.3 °C)  Heart Rate:  [75-96] 96  Resp:  [16-18] 18  BP: (113-155)/(61-89) 129/81      Physical Exam:  Constitutional: No acute distress, awake, alert, up on side of bed, no family at bedside.  HENT: NCAT, mucous membranes moist  Respiratory: Diminished right lung base, respiratory effort normal on room air  Cardiovascular: RRR, no murmurs, rubs, or gallops  Gastrointestinal: Positive bowel sounds, soft, nontender, nondistended  Musculoskeletal: No bilateral ankle edema  Psychiatric: Appropriate affect, cooperative  Neurologic: Oriented x 3, strength symmetric in all extremities, Cranial Nerves grossly intact to confrontation, speech clear  Skin: No rashes noted to exposed skin       Pertinent  and/or Most Recent Results     LAB RESULTS:      Lab 09/15/22  0423 09/14/22  0551 09/10/22  0502 09/09/22  0425   WBC 5.54 5.86 5.47 6.92   HEMOGLOBIN 12.1* 11.6* 10.9* 11.3*   HEMATOCRIT 38.6 36.0* 33.3* 34.5*   PLATELETS 136* 118* 99* 112*   NEUTROS ABS  --   --  3.70  4.71   IMMATURE GRANS (ABS)  --   --  0.03 0.05   LYMPHS ABS  --   --  0.74 0.92   MONOS ABS  --   --  0.73 0.89   EOS ABS  --   --  0.25 0.32   MCV 88.9 85.7 84.5 85.8         Lab 09/15/22  0423 09/14/22  0542 09/13/22  0400 09/12/22  0328 09/10/22  0502   SODIUM 138 137 135* 134* 135*   POTASSIUM 3.3* 4.1 4.1 4.0 4.0   CHLORIDE 98 101 99 99 101   CO2 25.0 24.0 24.0 24.0 25.0   ANION GAP 15.0 12.0 12.0 11.0 9.0   BUN 17 18 20 25* 18   CREATININE 1.31* 1.18 1.10 1.29* 1.11   EGFR 57.8* 65.6 71.3 58.9* 70.6   GLUCOSE 151* 215* 324* 329* 333*   CALCIUM 9.2 9.2 8.7 8.5* 8.4*         Lab 09/14/22  0542   TOTAL PROTEIN 8.0   ALBUMIN 3.20*   GLOBULIN 4.8   ALT (SGPT) 68*   AST (SGOT) 72*   BILIRUBIN 0.3   ALK PHOS 172*         Lab 09/09/22  0425   PROBNP 153.4                 Brief Urine Lab Results     None        Microbiology Results (last 10 days)     Procedure Component Value - Date/Time    AFB Culture - Body Fluid, Pleural Cavity [[id number]] Collected: 09/06/22 1123    Lab Status: Preliminary result Specimen: Body Fluid from Pleural Cavity Updated: 09/13/22 1232     AFB Culture No AFB isolated at 1 week     AFB Stain No acid fast bacilli seen on concentrated smear    Body Fluid Culture - Body Fluid, Pleural Cavity [[id number]] Collected: 09/06/22 1123    Lab Status: Final result Specimen: Body Fluid from Pleural Cavity Updated: 09/09/22 1012     Body Fluid Culture No growth at 3 days     Gram Stain Many (4+) WBCs seen      No organisms seen    Anaerobic Culture - Pleural Fluid, Pleural Cavity [[id number]] Collected: 09/06/22 1123    Lab Status: Final result Specimen: Pleural Fluid from Pleural Cavity Updated: 09/11/22 0551     Anaerobic Culture No anaerobes isolated at 5 days    Fungus Culture - Body Fluid, Pleural Cavity [[id number]] Collected: 09/06/22 1123    Lab Status: Preliminary result Specimen: Body Fluid from Pleural Cavity Updated: 09/13/22 1232     Fungus Culture No fungus isolated at 1 week    KOH Prep -  Body Fluid, Pleural Cavity [[id number]] Collected: 09/06/22 1123    Lab Status: Final result Specimen: Body Fluid from Pleural Cavity Updated: 09/06/22 1250     KOH Prep No yeast or hyphal elements seen    Fungus Smear - Body Fluid, Pleural Cavity [[id number]] Collected: 09/06/22 1123    Lab Status: Final result Specimen: Body Fluid from Pleural Cavity Updated: 09/07/22 1518     Fungal Stain No yeast or hyphal elements seen          Adult Transesophageal Echo (DALE) W/ Cont if Necessary Per Protocol    Result Date: 9/15/2022  · The right ventricular cavity is small in size. · Global hyokinesis with EF 35% · Moderate MR · Mild AI      Adult Transthoracic Echo Complete W/ Cont if Necessary Per Protocol    Result Date: 9/6/2022  · Left ventricular wall thickness is consistent with mild concentric hypertrophy. · Mildly reduced right ventricular systolic function noted. · There is calcification of the aortic valve. · Severe mitral valve regurgitation is present. · Estimated right ventricular systolic pressure from tricuspid regurgitation is mildly elevated (35-45 mmHg). Calculated right ventricular systolic pressure from tricuspid regurgitation is 37 mmHg. · There is a right pleural effusion. · Global LV hypokinesis · EF=30-35%      CT Abdomen Pelvis With & Without Contrast    Result Date: 9/8/2022  DATE OF EXAM: 9/7/2022 10:11 AM  PROCEDURE: CT ABDOMEN PELVIS W WO CONTRAST-  INDICATIONS: Ascites; S59-Azoddvq effusion, not elsewhere classified; R06.02-Shortness of breath  COMPARISON: No comparisons available.  TECHNIQUE: Routine transaxial slices were obtained through the abdomen and pelvis without the administration of intravenous contrast. Additional scanning through the abdomen and pelvis followed by the intravenous administration of 95 mL of Isovue 300. Reconstructed coronal and sagittal images were also obtained. Automated exposure control and iterative construction methods were used.  The radiation dose reduction  device was turned on for each scan per the ALARA (As Low as Reasonably Achievable) protocol.  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  Images of the lung bases show a large free-flowing right pleural effusion and no left effusion. Unenhanced images of the liver show diffuse nodularity consistent with cirrhosis. No abnormal arterial phase enhancement is seen. Portal venous phase and delayed venous phase images likewise show uniform, normal liver parenchymal enhancement with no evidence of mass. There is no evidence of biliary ductal dilatation or intrahepatic mass effect to suggest underlying, occult mass. The portal veins, splenic vein, and superior mesenteric veins all enhance normally with contrast. Hepatic veins enhance normally as well. Intrahepatic IVC remains small in caliber as seen on axial image 28 series 6.  Elsewhere, the spleen is in the upper range of normal size at 13 cm in length but not considered pathologically enlarged. Pancreas, gallbladder, adrenal glands, and kidneys appear within normal limits. No upper abdominal free air ascites or adenopathy is appreciated. Large and small bowel loops are normal in caliber and normal in appearance except for questionable ascending and transverse colon mucosal thickening. In part this may be due to nondistention, but some actual mucosal thickening is favored, at least to the ascending colon. Ileocecal valve and terminal ileum appear grossly normal. Appendix is not identified. There is mild pelvic ascites. No mass or adenopathy is seen. Bladder is normally distended and normal in appearance.  Delayed venous phase images show no evidence of obstructive uropathy. Bony structures appear to be intact.       1. Large, free-flowing right pleural effusion, potentially hepatic hydrothorax. No left effusion. 2. Liver morphology consistent with cirrhosis. Otherwise uniform appearance of liver with no evidence of liver mass or ductal  dilatation. 3. Mild ascites. 4. Mildly thick-walled appearance of the ascending and transverse colon, perhaps in part due to nondistention. Mild active colonic inflammation is favored. Please correlate with any GI symptoms.  This report was finalized on 9/8/2022 10:09 AM by Dr. Anthony Garcia MD.      XR Chest 1 View    Result Date: 9/12/2022  Examination: XR CHEST 1 VW-  Date of Exam: 9/12/2022 7:58 AM  Indication: eval effusion; F68-Bdfnyzw effusion, not elsewhere classified; R06.02-Shortness of breath.  Comparison: Radiograph 09/09/2022, 09/06/2022  Technique: 1 view of the chest  Findings: Mild patchy airspace changes present within the right lung base. Small right-sided pleural effusion present. No pneumothorax. The heart size is normal. The pulmonary vasculature appears within normal limits. No acute osseous abnormality identified.      Mild patchy airspace changes present within the right lung base with small right-sided pleural effusion, improved as compared to the previous study.  This report was finalized on 9/12/2022 8:16 AM by Jennifer Robert MD.      XR Chest 1 View    Result Date: 9/9/2022  DATE OF EXAM: 9/9/2022 1:11 AM  PROCEDURE: XR CHEST 1 VW-  INDICATIONS: pleural effusion, cough; L52-Bfcxoyh effusion, not elsewhere classified; R06.02-Shortness of breath  COMPARISON: 9/6/2022  TECHNIQUE: Single radiographic AP view of the chest was obtained.  FINDINGS: The heart size is normal and the left lung remains clear. There is an increase in density diffusely in the right hemithorax representing a combination of pleural fluid and atelectasis. The pulmonary vascular markings are normal.      Increasing pleural fluid and increasing atelectasis in the right lung.  This report was finalized on 9/9/2022 7:09 AM by Haroldo Arciniega MD.      XR Chest 1 View    Result Date: 9/6/2022  DATE OF EXAM: 9/6/2022 11:11 AM  PROCEDURE: XR CHEST 1 VW-  INDICATIONS: thora; T60-Rrqwbqd effusion, not elsewhere classified; R06.02-Shortness  of breath  COMPARISON: 9/6/2022 8:20 AM  TECHNIQUE: Single radiographic AP view of the chest was obtained.  FINDINGS: Unchanged cardiac silhouette. Interval decrease in now small right pleural effusion. Right basilar opacity which may represent atelectasis. No evidence of pneumothorax. Left lung is clear. No acute osseous abnormality. Degenerative changes of the acromioclavicular joints.      Interval decrease in now small right pleural effusion with associated right basilar opacity which may represent atelectasis. No evidence of pneumothorax.  This report was finalized on 9/6/2022 11:41 AM by Austin Perez.      XR Chest 1 View    Result Date: 9/6/2022  DATE OF EXAM: 9/6/2022 8:28 AM  PROCEDURE: XR CHEST 1 VW-  INDICATIONS: evaluate effusion; S79-Kvrhrqf effusion, not elsewhere classified; R06.02-Shortness of breath  COMPARISON: One day prior  TECHNIQUE: Single radiographic AP view of the chest was obtained.  FINDINGS: There is unchanged dense opacity at the right lung base, favoring component of unchanged moderate to large effusion and volume loss. Some component of elevation of the hemidiaphragm is possible, difficult to evaluate in the absence of more remote comparison exams. The left lung remains clear. There is no pneumothorax. Unchanged mild cardiac enlargement.      There is unchanged dense opacity at the right lung base, favoring component of unchanged moderate to large effusion and volume loss. Some component of elevation of the hemidiaphragm is possible, difficult to evaluate in the absence of more remote comparison exams. The left lung remains clear. There is no pneumothorax. Unchanged mild cardiac enlargement.  This report was finalized on 9/6/2022 9:01 AM by Chuy Hollingsworth.      XR Chest 1 View    Result Date: 9/5/2022   DATE OF EXAM: 9/5/2022 11:45 AM  PROCEDURE: XR CHEST 1 VW-  INDICATIONS: SOA triage protocol  COMPARISON: No Comparisons Available  TECHNIQUE: Portable Chest  FINDINGS:  Study is  limited by overlying support and monitoring apparatus  There is a large right-sided pleural effusion suspected. Vascular crowding and dependent atelectasis noted at the right base. Fluid is noted within the fissure. Left lung appears grossly clear. Attenuation heart size is limited by pleural effusion. Mediastinum appears grossly unremarkable on the left. Osseous structures demonstrate no acute abnormality      IMPRESSION : Large right-sided pleural effusion with associated vascular crowding and dependent atelectasis on the right.  Left lung appears grossly clear[  This report was finalized on 9/5/2022 12:14 PM by Godwin Huston.      US Thoracentesis    Result Date: 9/6/2022  US THORACENTESIS-                                                         History: US THORACENTESIS-                                                : Jerry Morin MD.  Assistant:  None.                                                                             Modality: Ultrasound                                                                                                          Sedation: None.  Anesthesia: Lidocaine, local infiltration.           Estimated blood loss:  < 5 cc.          Technique: Discussion of risks, benefits, and alternatives were made with the patient. The patient expressed understanding and agreed to proceed. Informed written consent was obtained. A universal timeout was performed prior to starting the procedure. Maximal sterile precautions were utilized. The procedure room personnel used personal protective equipment. The operators additionally used sterile surgical gloves.  A preliminary ultrasonography was performed. It showed a pleural effusion. A low intercostal access site was selected for access. Pertinent ultrasound images were stored to the PACS for documentation. The patient position was optimized for the performance of thoracentesis. The access site was sterilely prepped and draped.  Local anesthesia was administered. A catheter over the needle system was advanced into the pleura. Straw colored fluid was aspirated and the plastic catheter advanced into the pleural space. The catheter was then connected to a fluid recovery system.  Endpoint of thoracentesis: Incessant coughing  At the end of the procedure, the catheter was withdrawn and an aseptic dressing applied. The patient tolerated the procedure well.  Postprocedure chest x-ray is pending.  After uneventful recovery recovery, the patient was discharged from the department in stable condition.           Complications: None immediate.  Specimen: The specimen was labeled and sent to the lab in appropriate carriers & containers if such was requested by the ordering provider..                                                                  Impression:   Successful ultrasound-guided thoracentesis of right pleural effusion with recovery of 2 liters of pleural fluid.            We stopped early because of the reason mentioned above, we can safely perform another thoracentesis in the near future. The patient should be evaluated for hepatic hydrothorax.  Thank you for the opportunity to assist in the care of your patient.  This report was finalized on 9/6/2022 11:12 AM by Jerry Morin MD.                Results for orders placed during the hospital encounter of 09/05/22    Adult Transesophageal Echo (DALE) W/ Cont if Necessary Per Protocol    Interpretation Summary  · The right ventricular cavity is small in size.  · Global hyokinesis with EF 35%  · Moderate MR  · Mild AI      Plan for Follow-up of Pending Labs/Results:   Pending Labs     Order Current Status    AFB Culture - Body Fluid, Pleural Cavity Preliminary result    Fungus Culture - Body Fluid, Pleural Cavity Preliminary result        Discharge Details        Discharge Medications      New Medications      Instructions Start Date   empagliflozin 10 MG tablet tablet  Commonly known as:  JARDIANCE   10 mg, Oral, Daily   Start Date: September 16, 2022     furosemide 40 MG tablet  Commonly known as: LASIX   40 mg, Oral, Daily   Start Date: September 16, 2022     pantoprazole 40 MG EC tablet  Commonly known as: PROTONIX   40 mg, Oral, Daily at 1100   Start Date: September 16, 2022     sacubitril-valsartan 49-51 MG tablet  Commonly known as: ENTRESTO   1 tablet, Oral, Every 12 Hours Scheduled      spironolactone 100 MG tablet  Commonly known as: ALDACTONE   100 mg, Oral, Daily   Start Date: September 16, 2022        Changes to Medications      Instructions Start Date   metoprolol succinate  MG 24 hr tablet  Commonly known as: TOPROL-XL  What changed:   · medication strength  · how much to take  · when to take this   100 mg, Oral, Every 24 Hours Scheduled      venlafaxine  MG 24 hr capsule  Commonly known as: EFFEXOR-XR  What changed:   · medication strength  · how much to take  · when to take this   150 mg, Oral, Daily With Breakfast   Start Date: September 16, 2022        Continue These Medications      Instructions Start Date   insulin lispro protamine-insulin lispro (75-25) 100 UNIT/ML suspension injection  Commonly known as: humaLOG 75-25   30 Units, Subcutaneous, Every Morning      insulin lispro protamine-insulin lispro (75-25) 100 UNIT/ML suspension injection  Commonly known as: humaLOG 75-25   25 Units, Subcutaneous, Nightly      lactulose 10 GM/15ML solution  Commonly known as: CHRONULAC   20 g, Oral, 3 Times Daily      levothyroxine 75 MCG tablet  Commonly known as: SYNTHROID, LEVOTHROID   75 mcg, Oral, Daily      metFORMIN 1000 MG tablet  Commonly known as: GLUCOPHAGE   2,000 mg, Oral, Every Evening      rifAXIMin 200 MG tablet  Commonly known as: XIFAXAN   200 mg, Oral, 2 Times Daily      simvastatin 40 MG tablet  Commonly known as: ZOCOR   40 mg, Oral, Nightly             Allergies   Allergen Reactions   • Penicillins          Discharge Disposition:  Home or Self  Care    Diet:  Hospital:  Diet Order   Procedures   • Diet Regular; Thin; Cardiac, Low Sodium, Consistent Carbohydrate; 2,000 mg Na       Activity:  Activity Instructions     Activity as Tolerated            CODE STATUS:    Code Status and Medical Interventions:   Ordered at: 09/05/22 1624     Code Status (Patient has no pulse and is not breathing):    CPR (Attempt to Resuscitate)     Medical Interventions (Patient has pulse or is breathing):    Full Support       No future appointments.    Additional Instructions for the Follow-ups that You Need to Schedule     Discharge Follow-up with PCP   As directed       Currently Documented PCP:    Riley Mckoy DO    PCP Phone Number:    [phone]     Follow Up Details: first available hospital follow up appt         Discharge Follow-up with Specified Provider: [facility]; 3 Weeks   As directed      To: [facility]    Follow Up: 3 Weeks         Discharge Follow-up with Specified Provider: Dr. Osorio   As directed      To: Dr. Osorio    Follow Up Details: 6-8 weeks                     STEPHANIE Browne  09/15/22      Time Spent on Discharge:  I spent  42  minutes on this discharge activity which included: face-to-face encounter with the patient, reviewing the data in the system, coordination of the care with the nursing staff as well as consultants, documentation, and entering orders.

## 2022-09-16 ENCOUNTER — APPOINTMENT (OUTPATIENT)
Dept: GENERAL RADIOLOGY | Facility: HOSPITAL | Age: 72
End: 2022-09-16

## 2022-09-16 ENCOUNTER — HOSPITAL ENCOUNTER (OUTPATIENT)
Facility: HOSPITAL | Age: 72
Setting detail: OBSERVATION
Discharge: NURSING, REHAB OR LONG-TERM CARE | End: 2022-09-22
Attending: EMERGENCY MEDICINE | Admitting: HOSPITALIST

## 2022-09-16 ENCOUNTER — NURSE TRIAGE (OUTPATIENT)
Dept: CALL CENTER | Facility: HOSPITAL | Age: 72
End: 2022-09-16

## 2022-09-16 DIAGNOSIS — I95.1 ORTHOSTATIC HYPOTENSION: Primary | ICD-10-CM

## 2022-09-16 DIAGNOSIS — R55 SYNCOPE, UNSPECIFIED SYNCOPE TYPE: ICD-10-CM

## 2022-09-16 LAB
ALBUMIN SERPL-MCNC: 3.2 G/DL (ref 3.5–5.2)
ALBUMIN/GLOB SERPL: 0.7 G/DL
ALP SERPL-CCNC: 158 U/L (ref 39–117)
ALT SERPL W P-5'-P-CCNC: 61 U/L (ref 1–41)
AMMONIA BLD-SCNC: 17 UMOL/L (ref 16–60)
ANION GAP SERPL CALCULATED.3IONS-SCNC: 14 MMOL/L (ref 5–15)
AST SERPL-CCNC: 70 U/L (ref 1–40)
BASOPHILS # BLD AUTO: 0.04 10*3/MM3 (ref 0–0.2)
BASOPHILS NFR BLD AUTO: 0.5 % (ref 0–1.5)
BILIRUB SERPL-MCNC: 0.2 MG/DL (ref 0–1.2)
BUN SERPL-MCNC: 25 MG/DL (ref 8–23)
BUN/CREAT SERPL: 15 (ref 7–25)
CALCIUM SPEC-SCNC: 9 MG/DL (ref 8.6–10.5)
CHLORIDE SERPL-SCNC: 99 MMOL/L (ref 98–107)
CO2 SERPL-SCNC: 23 MMOL/L (ref 22–29)
CREAT SERPL-MCNC: 1.67 MG/DL (ref 0.76–1.27)
DEPRECATED RDW RBC AUTO: 49.6 FL (ref 37–54)
EGFRCR SERPLBLD CKD-EPI 2021: 43.2 ML/MIN/1.73
EOSINOPHIL # BLD AUTO: 0.18 10*3/MM3 (ref 0–0.4)
EOSINOPHIL NFR BLD AUTO: 2.2 % (ref 0.3–6.2)
ERYTHROCYTE [DISTWIDTH] IN BLOOD BY AUTOMATED COUNT: 15.9 % (ref 12.3–15.4)
GLOBULIN UR ELPH-MCNC: 4.6 GM/DL
GLUCOSE SERPL-MCNC: 85 MG/DL (ref 65–99)
HCT VFR BLD AUTO: 34.4 % (ref 37.5–51)
HGB BLD-MCNC: 11.1 G/DL (ref 13–17.7)
HOLD SPECIMEN: NORMAL
IMM GRANULOCYTES # BLD AUTO: 0.06 10*3/MM3 (ref 0–0.05)
IMM GRANULOCYTES NFR BLD AUTO: 0.7 % (ref 0–0.5)
LYMPHOCYTES # BLD AUTO: 1.31 10*3/MM3 (ref 0.7–3.1)
LYMPHOCYTES NFR BLD AUTO: 16 % (ref 19.6–45.3)
MAGNESIUM SERPL-MCNC: 1.9 MG/DL (ref 1.6–2.4)
MCH RBC QN AUTO: 27.7 PG (ref 26.6–33)
MCHC RBC AUTO-ENTMCNC: 32.3 G/DL (ref 31.5–35.7)
MCV RBC AUTO: 85.8 FL (ref 79–97)
MONOCYTES # BLD AUTO: 1.3 10*3/MM3 (ref 0.1–0.9)
MONOCYTES NFR BLD AUTO: 15.9 % (ref 5–12)
NEUTROPHILS NFR BLD AUTO: 5.29 10*3/MM3 (ref 1.7–7)
NEUTROPHILS NFR BLD AUTO: 64.7 % (ref 42.7–76)
NRBC BLD AUTO-RTO: 0 /100 WBC (ref 0–0.2)
NT-PROBNP SERPL-MCNC: 109.7 PG/ML (ref 0–900)
PLATELET # BLD AUTO: 141 10*3/MM3 (ref 140–450)
PMV BLD AUTO: 12.5 FL (ref 6–12)
POTASSIUM SERPL-SCNC: 4 MMOL/L (ref 3.5–5.2)
PROT SERPL-MCNC: 7.8 G/DL (ref 6–8.5)
RBC # BLD AUTO: 4.01 10*6/MM3 (ref 4.14–5.8)
SODIUM SERPL-SCNC: 136 MMOL/L (ref 136–145)
TROPONIN T SERPL-MCNC: <0.01 NG/ML (ref 0–0.03)
WBC NRBC COR # BLD: 8.18 10*3/MM3 (ref 3.4–10.8)
WHOLE BLOOD HOLD COAG: NORMAL
WHOLE BLOOD HOLD SPECIMEN: NORMAL

## 2022-09-16 PROCEDURE — 93005 ELECTROCARDIOGRAM TRACING: CPT

## 2022-09-16 PROCEDURE — 93005 ELECTROCARDIOGRAM TRACING: CPT | Performed by: EMERGENCY MEDICINE

## 2022-09-16 PROCEDURE — 85025 COMPLETE CBC W/AUTO DIFF WBC: CPT | Performed by: EMERGENCY MEDICINE

## 2022-09-16 PROCEDURE — 83735 ASSAY OF MAGNESIUM: CPT | Performed by: EMERGENCY MEDICINE

## 2022-09-16 PROCEDURE — G0378 HOSPITAL OBSERVATION PER HR: HCPCS

## 2022-09-16 PROCEDURE — 99220 PR INITIAL OBSERVATION CARE/DAY 70 MINUTES: CPT | Performed by: FAMILY MEDICINE

## 2022-09-16 PROCEDURE — 99284 EMERGENCY DEPT VISIT MOD MDM: CPT

## 2022-09-16 PROCEDURE — 83880 ASSAY OF NATRIURETIC PEPTIDE: CPT | Performed by: PHYSICIAN ASSISTANT

## 2022-09-16 PROCEDURE — 82140 ASSAY OF AMMONIA: CPT | Performed by: PHYSICIAN ASSISTANT

## 2022-09-16 PROCEDURE — 71045 X-RAY EXAM CHEST 1 VIEW: CPT

## 2022-09-16 PROCEDURE — 84484 ASSAY OF TROPONIN QUANT: CPT | Performed by: EMERGENCY MEDICINE

## 2022-09-16 PROCEDURE — 80053 COMPREHEN METABOLIC PANEL: CPT | Performed by: EMERGENCY MEDICINE

## 2022-09-16 RX ORDER — SODIUM CHLORIDE 0.9 % (FLUSH) 0.9 %
10 SYRINGE (ML) INJECTION AS NEEDED
Status: DISCONTINUED | OUTPATIENT
Start: 2022-09-16 | End: 2022-09-22 | Stop reason: HOSPADM

## 2022-09-16 RX ADMIN — SODIUM CHLORIDE 500 ML: 9 INJECTION, SOLUTION INTRAVENOUS at 21:12

## 2022-09-16 NOTE — OUTREACH NOTE
Prep Survey    Flowsheet Row Responses   [facility] facility patient discharged from? [place]   Is LACE score < 7 ? No   Emergency Room discharge w/ pulse ox? No   Eligibility Not Eligible   What are the reasons patient is not eligible? Other  [resides assited living facility]   Does the patient have one of the following disease processes/diagnoses(primary or secondary)? Other  [Recurrent large R pleural effusion, Hepatic encephalopathy]   Prep survey completed? Yes          FELISA Esqueda Registered Nurse

## 2022-09-16 NOTE — TELEPHONE ENCOUNTER
"Discharged yesterday  Medication changes during hospital stay  Dos not have B/p reading.    Weakness and dizziness since discharge.   Care advice per guideline.      Reason for Disposition  • Taking a medicine that could cause dizziness (e.g., blood pressure medications, diuretics)    Additional Information  • Negative: SEVERE difficulty breathing (e.g., struggling for each breath, speaks in single words)  • Negative: [1] Difficulty breathing or swallowing AND [2] started suddenly after medicine, an allergic food or bee sting  • Negative: Shock suspected (e.g., cold/pale/clammy skin, too weak to stand, low BP, rapid pulse)  • Negative: Difficult to awaken or acting confused (e.g., disoriented, slurred speech)  • Negative: [1] Weakness (i.e., paralysis, loss of muscle strength) of the face, arm or leg on one side of the body AND [2] sudden onset AND [3] present now  • Negative: [1] Numbness (i.e., loss of sensation) of the face, arm or leg on one side of the body AND [2] sudden onset AND [3] present now  • Negative: [1] Loss of speech or garbled speech AND [2] sudden onset AND [3] present now  • Negative: Overdose (accidental or intentional) of medications  • Negative: [1] Fainted > 15 minutes ago AND [2] still feels too weak or dizzy to stand  • Negative: Heart beating < 50 beats per minute OR > 140 beats per minute  • Negative: Sounds like a life-threatening emergency to the triager  • Negative: Chest pain  • Negative: Rectal bleeding, bloody stool, or tarry-black stool  • Negative: [1] Vomiting AND [2] contains red blood or black (\"coffee ground\") material  • Negative: Vomiting is main symptom  • Negative: Diarrhea is main symptom  • Negative: Headache is main symptom  • Negative: Patient states that they are having an anxiety or panic attack  • Negative: Dizziness from low blood sugar (i.e., < 60 mg/dl or 3.5 mmol/l)  • Negative: Dizziness is described as a spinning sensation (i.e., vertigo)  • Negative: Heat " "exhaustion suspected (i.e., dehydration from heat exposure)  • Negative: Difficulty breathing  • Negative: SEVERE dizziness (e.g., unable to stand, requires support to walk, feels like passing out now)  • Negative: Extra heart beats OR irregular heart beating  (i.e., \"palpitations\")  • Negative: [1] Drinking very little AND [2] dehydration suspected (e.g., no urine > 12 hours, very dry mouth, very lightheaded)  • Negative: [1] Weakness (i.e., paralysis, loss of muscle strength) of the face, arm / hand, or leg / foot on one side of the body AND [2] sudden onset AND [3] brief (now gone)  • Negative: [1] Numbness (i.e., loss of sensation) of the face, arm / hand, or leg / foot on one side of the body AND [2] sudden onset AND [3] brief (now gone)  • Negative: [1] Loss of speech or garbled speech AND [2] sudden onset AND [3] brief (now gone)  • Negative: Loss of vision or double vision (Exception: similar to previous migraines)  • Negative: Patient sounds very sick or weak to the triager  • Negative: [1] Dizziness caused by heat exposure, sudden standing, or poor fluid intake AND [2] no improvement after 2 hours of rest and fluids  • Negative: [1] Fever > 103 F (39.4 C) AND [2] not able to get the fever down using Fever Care Advice  • Negative: [1] Fever > 101 F (38.3 C) AND [2] age > 60 years  • Negative: [1] Fever > 100.0 F (37.8 C) AND [2] bedridden (e.g., nursing home patient, CVA, chronic illness, recovering from surgery)  • Negative: [1] Fever > 100.0 F (37.8 C) AND [2] diabetes mellitus or weak immune system (e.g., HIV positive, cancer chemo, splenectomy, organ transplant, chronic steroids)  • Negative: [1] MODERATE dizziness (e.g., interferes with normal activities) AND [2] has NOT been evaluated by physician for this  (Exception: dizziness caused by heat exposure, sudden standing, or poor fluid intake)  • Negative: Fever present > 3 days (72 hours)    Answer Assessment - Initial Assessment Questions  1. " "DESCRIPTION: \"Describe your dizziness.\"     lightheaded  2. LIGHTHEADED: \"Do you feel lightheaded?\" (e.g., somewhat faint, woozy, weak upon standing)      Knees buckle when standing  3. VERTIGO: \"Do you feel like either you or the room is spinning or tilting?\" (i.e. vertigo)      *No Answer*  4. SEVERITY: \"How bad is it?\"  \"Do you feel like you are going to faint?\" \"Can you stand and walk?\"    - MILD: Feels slightly dizzy, but walking normally.    - MODERATE: Feels very unsteady when walking, but not falling; interferes with normal activities (e.g., school, work) .    - SEVERE: Unable to walk without falling, or requires assistance to walk without falling; feels like passing out now.     moderate  5. ONSET:  \"When did the dizziness begin?\"    During hospital stay  6. AGGRAVATING FACTORS: \"Does anything make it worse?\" (e.g., standing, change in head position)      *No Answer*  7. HEART RATE: \"Can you tell me your heart rate?\" \"How many beats in 15 seconds?\"  (Note: not all patients can do this)        *No Answer*  8. CAUSE: \"What do you think is causing the dizziness?\"  Maybe medication schnages  9. RECURRENT SYMPTOM: \"Have you had dizziness before?\" If Yes, ask: \"When was the last time?\" \"What happened that time?\"      *No Answer*  10. OTHER SYMPTOMS: \"Do you have any other symptoms?\" (e.g., fever, chest pain, vomiting, diarrhea, bleeding)       denies  11. PREGNANCY: \"Is there any chance you are pregnant?\" \"When was your last menstrual period?\"   na    Protocols used: DIZZINESS - LIGHTHEADEDNESS-ADULT-AH      "

## 2022-09-16 NOTE — ED PROVIDER NOTES
[place]    EMERGENCY DEPARTMENT ENCOUNTER      Pt Name: Gwyn Fuchs  MRN: [id number]  YOB: 1950  Date of evaluation: 9/16/2022  Provider: CAROLYNN Vazquez    CHIEF COMPLAINT       Chief Complaint   Patient presents with   • Syncope         HISTORY OF PRESENT ILLNESS  (Location/Symptom, Timing/Onset, Context/Setting, Quality, Duration, Modifying Factors, Severity.)   Gwyn Fuchs is a 72 y.o. male who presents to the emergency department for evaluation of multiple syncopal episodes.  Patient was recently admitted to this facility on September 5, 2022 and discharged on September 15, 2022. Family at bedside notes that upon discharge patient was being transferred and experienced a syncopal episode.  Upon returning home patient continued to have syncopal episodes every time he went to get up.  Family reports that patient currently resides in an assisted living facility due to increased memory/cognitive issues. They were noted by the facility today that patient had was passing out when he stands up.  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  Family reports patient had large pleural effusions.  While inpatient he was followed by Dr. Osorio with cardiology and placed on new medications including diuretics. Family at bedside concerned that new medications need to be adjusted as they are dropping his pressures to low and causing him to be orthostatic. They report with his episodes of syncope he completely blacks out and then when he comes to has no memory of the event. Patient denies any additional symptoms on exam. He fortunately has not fallen or hit his head with these episodes.     HPI   Nursing notes were reviewed.    REVIEW OF SYSTEMS    (2-9 systems for level 4, 10 or more for level 5)   Review of Systems   Constitutional: Positive for activity change. Negative for chills, diaphoresis, fatigue and fever.    Neurological: Positive for syncope and light-headedness.        All systems reviewed and negative except for those discussed in HPI.   PAST MEDICAL HISTORY     Past Medical History:   Diagnosis Date   • Coronary artery disease    • Diabetes mellitus (HCC)    • Hyperlipidemia    • Hypertension    • Myocardial infarction (HCC)          SURGICAL HISTORY       Past Surgical History:   Procedure Laterality Date   • KNEE SURGERY Right          CURRENT MEDICATIONS       Current Facility-Administered Medications:   •  sodium chloride 0.9 % bolus 500 mL, 500 mL, Intravenous, Once, Hugo Doss PA, Last Rate: 1,000 mL/hr at 09/16/22 2112, 500 mL at 09/16/22 2112  •  sodium chloride 0.9 % flush 10 mL, 10 mL, Intravenous, PRN, Alberto Lanier MD    Current Outpatient Medications:   •  empagliflozin (JARDIANCE) 10 MG tablet tablet, Take 1 tablet by mouth Daily., Disp: 30 tablet, Rfl: 0  •  furosemide (LASIX) 40 MG tablet, Take 1 tablet by mouth Daily., Disp: 30 tablet, Rfl: 0  •  insulin lispro protamine-insulin lispro (humaLOG 75-25) (75-25) 100 UNIT/ML suspension injection, Inject 30 Units under the skin into the appropriate area as directed Every Morning., Disp: , Rfl:   •  insulin lispro protamine-insulin lispro (humaLOG 75-25) (75-25) 100 UNIT/ML suspension injection, Inject 25 Units under the skin into the appropriate area as directed Every Night., Disp: , Rfl:   •  lactulose (CHRONULAC) 10 GM/15ML solution, Take 20 g by mouth 3 (Three) Times a Day., Disp: , Rfl:   •  levothyroxine (SYNTHROID, LEVOTHROID) 75 MCG tablet, Take 75 mcg by mouth Daily., Disp: , Rfl:   •  metFORMIN (GLUCOPHAGE) 1000 MG tablet, Take 2,000 mg by mouth Every Evening., Disp: , Rfl:   •  metoprolol succinate XL (TOPROL-XL) 100 MG 24 hr tablet, Take 1 tablet by mouth Daily., Disp: 30 tablet, Rfl: 11  •  pantoprazole (PROTONIX) 40 MG EC tablet, Take 1 tablet by mouth Daily., Disp: 30 tablet, Rfl: 0  •  rifAXIMin (XIFAXAN) 200 MG tablet, Take 200  mg by mouth 2 (Two) Times a Day., Disp: , Rfl:   •  sacubitril-valsartan (ENTRESTO) 49-51 MG tablet, Take 1 tablet by mouth Every 12 (Twelve) Hours., Disp: 60 tablet, Rfl: 11  •  simvastatin (ZOCOR) 40 MG tablet, Take 40 mg by mouth Every Night., Disp: , Rfl:   •  spironolactone (ALDACTONE) 100 MG tablet, Take 1 tablet by mouth Daily., Disp: 30 tablet, Rfl: 0  •  venlafaxine XR (EFFEXOR-XR) 150 MG 24 hr capsule, Take 1 capsule by mouth Daily With Breakfast., Disp: 30 capsule, Rfl: 0    ALLERGIES     Penicillins    FAMILY HISTORY       Family History   Problem Relation Age of Onset   • Heart failure Father    • Cancer Brother         Leukemia          SOCIAL HISTORY       Social History     Socioeconomic History   • Marital status:    Tobacco Use   • Smoking status: Former Smoker     Types: Cigars   • Smokeless tobacco: Never Used   Vaping Use   • Vaping Use: Never used   Substance and Sexual Activity   • Alcohol use: Not Currently     Comment: 2-3 drinks of vodka weekly   • Drug use: No         PHYSICAL EXAM    (up to 7 for level 4, 8 or more for level 5)   Physical Exam  Vitals and nursing note reviewed.   Constitutional:       General: He is not in acute distress.     Appearance: Normal appearance. He is well-developed. He is not ill-appearing or toxic-appearing.   HENT:      Head: Normocephalic and atraumatic.      Nose: Nose normal.      Mouth/Throat:      Mouth: Mucous membranes are moist.   Eyes:      Extraocular Movements: Extraocular movements intact.   Cardiovascular:      Rate and Rhythm: Normal rate and regular rhythm.      Pulses: Normal pulses.   Pulmonary:      Effort: Pulmonary effort is normal. No respiratory distress.   Abdominal:      General: There is no distension.      Palpations: Abdomen is soft.      Tenderness: There is no abdominal tenderness.   Musculoskeletal:         General: No deformity. Normal range of motion.      Cervical back: Normal range of motion.   Skin:     General:  Skin is warm and dry.   Neurological:      Mental Status: He is alert. Mental status is at baseline.   Psychiatric:         Behavior: Behavior normal.         Thought Content: Thought content normal.         Judgment: Judgment normal.          DIAGNOSTIC RESULTS     EKG: All EKGs are interpreted by the Emergency Department Physician who either signs or Co-signs this chart in the absence of a cardiologist.    ECG 12 Lead   Preliminary Result   Test Reason : Syncope triage protocol   Blood Pressure :   */*   mmHG   Vent. Rate :  79 BPM     Atrial Rate :  79 BPM      P-R Int : 176 ms          QRS Dur :  96 ms       QT Int : 426 ms       P-R-T Axes :  53 -44  73 degrees      QTc Int : 488 ms      Normal sinus rhythm   Possible Left atrial enlargement   Left axis deviation   Prolonged QT   Abnormal ECG   When compared with ECG of 05-SEP-2022 11:33,   No significant change was found      Referred By: ED MD           Confirmed By:           RADIOLOGY:   Non-plain film images such as CT, Ultrasound and MRI are read by the radiologist. Plain radiographic images are visualized and preliminarily interpreted by the emergency physician with the below findings:      [x] Radiologist's Report Reviewed:  XR Chest 1 View   Final Result   Mild increased conspicuity of small right pleural effusion and   associated right base opacities, likely associated atelectasis.       This report was finalized on 9/16/2022 8:41 PM by Lei Lizarraga MD.                ED BEDSIDE ULTRASOUND:   Performed by ED Physician - none    LABS:    I have reviewed and interpreted all of the currently available lab results from this visit (if applicable):  Results for orders placed or performed during the hospital encounter of 09/16/22   Comprehensive Metabolic Panel    Specimen: Blood   Result Value Ref Range    Glucose 85 65 - 99 mg/dL    BUN 25 (H) 8 - 23 mg/dL    Creatinine 1.67 (H) 0.76 - 1.27 mg/dL    Sodium 136 136 - 145 mmol/L    Potassium 4.0 3.5 - 5.2  mmol/L    Chloride 99 98 - 107 mmol/L    CO2 23.0 22.0 - 29.0 mmol/L    Calcium 9.0 8.6 - 10.5 mg/dL    Total Protein 7.8 6.0 - 8.5 g/dL    Albumin 3.20 (L) 3.50 - 5.20 g/dL    ALT (SGPT) 61 (H) 1 - 41 U/L    AST (SGOT) 70 (H) 1 - 40 U/L    Alkaline Phosphatase 158 (H) 39 - 117 U/L    Total Bilirubin 0.2 0.0 - 1.2 mg/dL    Globulin 4.6 gm/dL    A/G Ratio 0.7 g/dL    BUN/Creatinine Ratio 15.0 7.0 - 25.0    Anion Gap 14.0 5.0 - 15.0 mmol/L    eGFR 43.2 (L) >60.0 mL/min/1.73   Magnesium    Specimen: Blood   Result Value Ref Range    Magnesium 1.9 1.6 - 2.4 mg/dL   Troponin    Specimen: Blood   Result Value Ref Range    Troponin T <0.010 0.000 - 0.030 ng/mL   CBC Auto Differential    Specimen: Blood   Result Value Ref Range    WBC 8.18 3.40 - 10.80 10*3/mm3    RBC 4.01 (L) 4.14 - 5.80 10*6/mm3    Hemoglobin 11.1 (L) 13.0 - 17.7 g/dL    Hematocrit 34.4 (L) 37.5 - 51.0 %    MCV 85.8 79.0 - 97.0 fL    MCH 27.7 26.6 - 33.0 pg    MCHC 32.3 31.5 - 35.7 g/dL    RDW 15.9 (H) 12.3 - 15.4 %    RDW-SD 49.6 37.0 - 54.0 fl    MPV 12.5 (H) 6.0 - 12.0 fL    Platelets 141 140 - 450 10*3/mm3    Neutrophil % 64.7 42.7 - 76.0 %    Lymphocyte % 16.0 (L) 19.6 - 45.3 %    Monocyte % 15.9 (H) 5.0 - 12.0 %    Eosinophil % 2.2 0.3 - 6.2 %    Basophil % 0.5 0.0 - 1.5 %    Immature Grans % 0.7 (H) 0.0 - 0.5 %    Neutrophils, Absolute 5.29 1.70 - 7.00 10*3/mm3    Lymphocytes, Absolute 1.31 0.70 - 3.10 10*3/mm3    Monocytes, Absolute 1.30 (H) 0.10 - 0.90 10*3/mm3    Eosinophils, Absolute 0.18 0.00 - 0.40 10*3/mm3    Basophils, Absolute 0.04 0.00 - 0.20 10*3/mm3    Immature Grans, Absolute 0.06 (H) 0.00 - 0.05 10*3/mm3    nRBC 0.0 0.0 - 0.2 /100 WBC   BNP    Specimen: Blood   Result Value Ref Range    proBNP 109.7 0.0 - 900.0 pg/mL   Ammonia    Specimen: Blood   Result Value Ref Range    Ammonia 17 16 - 60 umol/L   ECG 12 Lead   Result Value Ref Range    QT Interval 426 ms    QTC Interval 488 ms   Green Top (Gel)   Result Value Ref Range    Extra  Tube Hold for add-ons.    Lavender Top   Result Value Ref Range    Extra Tube hold for add-on    Gold Top - SST   Result Value Ref Range    Extra Tube Hold for add-ons.    Light Blue Top   Result Value Ref Range    Extra Tube Hold for add-ons.         All other labs were within normal range or not returned as of this dictation.      EMERGENCY DEPARTMENT COURSE and DIFFERENTIAL DIAGNOSIS/MDM:   Vitals:    Vitals:    09/16/22 1917 09/16/22 1945 09/16/22 2000 09/16/22 2100   BP: 91/61 121/62 127/65 108/58   BP Location:       Patient Position: Standing      Pulse: 85 79 81 80   Resp:       Temp:       TempSrc:       SpO2: 98% 97% 95% 96%   Weight:       Height:           ED Course as of 09/16/22 2121   Fri Sep 16, 2022   2104 Hospitalist consulted for admission agreeable to accept patient.  Patient and family at bedside agreeable plan of care and hospital admission. [JG]   2119 In summary this is a 72-year-old male who presents to the ER following multiple syncopal episodes.  Patient recently admitted and discharged from this facility following treatment for LEZAMA cirrhosis with pleural effusions.  Initiated on multiple new medications including diuretics.  Patient now with orthostatic hypotension causing syncope which was observed in the ED. Labs/urinalysis obtained and reviewed demonstrate no acute or emergent abnormalities.  EKG without evidence of acute ischemic changes.  Chest x-ray demonstrates mild increased conspicuity of small right pleural effusion and  associated right base opacities, likely associated atelectasis.  At time of admission disposition patient resting comfortably, no acute distress, vital signs stable and maintaining oxygen 96% on room air. [JG]      ED Course User Index  [JG] Hugo Doss, PA       MDM  Number of Diagnoses or Management Options  Orthostatic hypotension: new, needed workup  Syncope, unspecified syncope type: new, needed workup     Amount and/or Complexity of Data  Reviewed  Clinical lab tests: reviewed  Tests in the radiology section of CPT®: reviewed  Discuss the patient with other providers: yes    Risk of Complications, Morbidity, and/or Mortality  Presenting problems: moderate  Diagnostic procedures: moderate  Management options: moderate    Patient Progress  Patient progress: stable           MEDICATIONS ADMINISTERED IN ED:  Medications   sodium chloride 0.9 % flush 10 mL (has no administration in time range)   sodium chloride 0.9 % bolus 500 mL (500 mL Intravenous New Bag 9/16/22 2112)       PROCEDURES:  Procedures          CRITICAL CARE TIME    Total Critical Care time was 0 minutes, excluding separately reportable procedures.   There was a high probability of clinically significant/life threatening deterioration in the patient's condition which required my urgent intervention.      FINAL IMPRESSION      1. Orthostatic hypotension    2. Syncope, unspecified syncope type          DISPOSITION/PLAN     ED Disposition     ED Disposition   Decision to Admit    Condition   --    Comment   Level of Care: Telemetry [5]   Diagnosis: Orthostatic hypotension [458.0.ICD-9-CM]   Admitting Physician: SHERRI SHARPE [[id number]]   Attending Physician: SHERRI SHARPE [[id number]]               Comment: Please note this report has been produced using speech recognition software.      CAROLYNN Vazquez Jason C, PA  09/16/22 2122

## 2022-09-17 ENCOUNTER — HOME CARE VISIT (OUTPATIENT)
Dept: HOME HEALTH SERVICES | Facility: HOME HEALTHCARE | Age: 72
End: 2022-09-17

## 2022-09-17 LAB
ANION GAP SERPL CALCULATED.3IONS-SCNC: 13 MMOL/L (ref 5–15)
BUN SERPL-MCNC: 25 MG/DL (ref 8–23)
BUN/CREAT SERPL: 17.6 (ref 7–25)
CALCIUM SPEC-SCNC: 8.5 MG/DL (ref 8.6–10.5)
CHLORIDE SERPL-SCNC: 100 MMOL/L (ref 98–107)
CO2 SERPL-SCNC: 22 MMOL/L (ref 22–29)
CREAT SERPL-MCNC: 1.42 MG/DL (ref 0.76–1.27)
DEPRECATED RDW RBC AUTO: 49.8 FL (ref 37–54)
EGFRCR SERPLBLD CKD-EPI 2021: 52.5 ML/MIN/1.73
ERYTHROCYTE [DISTWIDTH] IN BLOOD BY AUTOMATED COUNT: 15.9 % (ref 12.3–15.4)
GLUCOSE BLDC GLUCOMTR-MCNC: 179 MG/DL (ref 70–130)
GLUCOSE BLDC GLUCOMTR-MCNC: 270 MG/DL (ref 70–130)
GLUCOSE BLDC GLUCOMTR-MCNC: 273 MG/DL (ref 70–130)
GLUCOSE SERPL-MCNC: 184 MG/DL (ref 65–99)
HCT VFR BLD AUTO: 33.1 % (ref 37.5–51)
HGB BLD-MCNC: 10.4 G/DL (ref 13–17.7)
MAGNESIUM SERPL-MCNC: 1.9 MG/DL (ref 1.6–2.4)
MCH RBC QN AUTO: 27.2 PG (ref 26.6–33)
MCHC RBC AUTO-ENTMCNC: 31.4 G/DL (ref 31.5–35.7)
MCV RBC AUTO: 86.4 FL (ref 79–97)
PLATELET # BLD AUTO: 120 10*3/MM3 (ref 140–450)
PMV BLD AUTO: 12.8 FL (ref 6–12)
POTASSIUM SERPL-SCNC: 4.2 MMOL/L (ref 3.5–5.2)
RBC # BLD AUTO: 3.83 10*6/MM3 (ref 4.14–5.8)
SODIUM SERPL-SCNC: 135 MMOL/L (ref 136–145)
WBC NRBC COR # BLD: 6.02 10*3/MM3 (ref 3.4–10.8)

## 2022-09-17 PROCEDURE — G0378 HOSPITAL OBSERVATION PER HR: HCPCS

## 2022-09-17 PROCEDURE — 99204 OFFICE O/P NEW MOD 45 MIN: CPT | Performed by: INTERNAL MEDICINE

## 2022-09-17 PROCEDURE — 63710000001 INSULIN LISPRO (HUMAN) PER 5 UNITS: Performed by: FAMILY MEDICINE

## 2022-09-17 PROCEDURE — 0 MAGNESIUM SULFATE 4 GM/100ML SOLUTION: Performed by: FAMILY MEDICINE

## 2022-09-17 PROCEDURE — 82962 GLUCOSE BLOOD TEST: CPT

## 2022-09-17 PROCEDURE — 97530 THERAPEUTIC ACTIVITIES: CPT

## 2022-09-17 PROCEDURE — 83735 ASSAY OF MAGNESIUM: CPT | Performed by: FAMILY MEDICINE

## 2022-09-17 PROCEDURE — 80048 BASIC METABOLIC PNL TOTAL CA: CPT | Performed by: FAMILY MEDICINE

## 2022-09-17 PROCEDURE — 97162 PT EVAL MOD COMPLEX 30 MIN: CPT

## 2022-09-17 PROCEDURE — 85027 COMPLETE CBC AUTOMATED: CPT | Performed by: FAMILY MEDICINE

## 2022-09-17 PROCEDURE — 99225 PR SBSQ OBSERVATION CARE/DAY 25 MINUTES: CPT | Performed by: INTERNAL MEDICINE

## 2022-09-17 RX ORDER — MAGNESIUM SULFATE HEPTAHYDRATE 40 MG/ML
2 INJECTION, SOLUTION INTRAVENOUS AS NEEDED
Status: DISCONTINUED | OUTPATIENT
Start: 2022-09-17 | End: 2022-09-22 | Stop reason: HOSPADM

## 2022-09-17 RX ORDER — METOPROLOL SUCCINATE 50 MG/1
50 TABLET, EXTENDED RELEASE ORAL
Status: DISCONTINUED | OUTPATIENT
Start: 2022-09-17 | End: 2022-09-18

## 2022-09-17 RX ORDER — DEXTROSE MONOHYDRATE 25 G/50ML
25 INJECTION, SOLUTION INTRAVENOUS
Status: DISCONTINUED | OUTPATIENT
Start: 2022-09-17 | End: 2022-09-22 | Stop reason: HOSPADM

## 2022-09-17 RX ORDER — PANTOPRAZOLE SODIUM 40 MG/1
40 TABLET, DELAYED RELEASE ORAL
Status: DISCONTINUED | OUTPATIENT
Start: 2022-09-17 | End: 2022-09-22 | Stop reason: HOSPADM

## 2022-09-17 RX ORDER — VENLAFAXINE HYDROCHLORIDE 75 MG/1
150 CAPSULE, EXTENDED RELEASE ORAL
Status: DISCONTINUED | OUTPATIENT
Start: 2022-09-17 | End: 2022-09-22 | Stop reason: HOSPADM

## 2022-09-17 RX ORDER — POTASSIUM CHLORIDE 1.5 G/1.77G
40 POWDER, FOR SOLUTION ORAL AS NEEDED
Status: DISCONTINUED | OUTPATIENT
Start: 2022-09-17 | End: 2022-09-22 | Stop reason: HOSPADM

## 2022-09-17 RX ORDER — LEVOTHYROXINE SODIUM 0.07 MG/1
75 TABLET ORAL
Status: DISCONTINUED | OUTPATIENT
Start: 2022-09-17 | End: 2022-09-22 | Stop reason: HOSPADM

## 2022-09-17 RX ORDER — ACETAMINOPHEN 325 MG/1
650 TABLET ORAL EVERY 4 HOURS PRN
Status: DISCONTINUED | OUTPATIENT
Start: 2022-09-17 | End: 2022-09-22 | Stop reason: HOSPADM

## 2022-09-17 RX ORDER — SODIUM CHLORIDE 0.9 % (FLUSH) 0.9 %
10 SYRINGE (ML) INJECTION AS NEEDED
Status: DISCONTINUED | OUTPATIENT
Start: 2022-09-17 | End: 2022-09-22 | Stop reason: HOSPADM

## 2022-09-17 RX ORDER — POTASSIUM CHLORIDE 750 MG/1
40 CAPSULE, EXTENDED RELEASE ORAL AS NEEDED
Status: DISCONTINUED | OUTPATIENT
Start: 2022-09-17 | End: 2022-09-22 | Stop reason: HOSPADM

## 2022-09-17 RX ORDER — INSULIN LISPRO 100 [IU]/ML
0-7 INJECTION, SOLUTION INTRAVENOUS; SUBCUTANEOUS
Status: DISCONTINUED | OUTPATIENT
Start: 2022-09-17 | End: 2022-09-21

## 2022-09-17 RX ORDER — ACETAMINOPHEN 160 MG/5ML
650 SOLUTION ORAL EVERY 4 HOURS PRN
Status: DISCONTINUED | OUTPATIENT
Start: 2022-09-17 | End: 2022-09-22 | Stop reason: HOSPADM

## 2022-09-17 RX ORDER — ONDANSETRON 2 MG/ML
4 INJECTION INTRAMUSCULAR; INTRAVENOUS EVERY 6 HOURS PRN
Status: DISCONTINUED | OUTPATIENT
Start: 2022-09-17 | End: 2022-09-22 | Stop reason: HOSPADM

## 2022-09-17 RX ORDER — NICOTINE POLACRILEX 4 MG
15 LOZENGE BUCCAL
Status: DISCONTINUED | OUTPATIENT
Start: 2022-09-17 | End: 2022-09-22 | Stop reason: HOSPADM

## 2022-09-17 RX ORDER — POTASSIUM CHLORIDE 7.45 MG/ML
10 INJECTION INTRAVENOUS
Status: DISCONTINUED | OUTPATIENT
Start: 2022-09-17 | End: 2022-09-22 | Stop reason: HOSPADM

## 2022-09-17 RX ORDER — SODIUM CHLORIDE 0.9 % (FLUSH) 0.9 %
10 SYRINGE (ML) INJECTION EVERY 12 HOURS SCHEDULED
Status: DISCONTINUED | OUTPATIENT
Start: 2022-09-17 | End: 2022-09-22 | Stop reason: HOSPADM

## 2022-09-17 RX ORDER — ACETAMINOPHEN 650 MG/1
650 SUPPOSITORY RECTAL EVERY 4 HOURS PRN
Status: DISCONTINUED | OUTPATIENT
Start: 2022-09-17 | End: 2022-09-22 | Stop reason: HOSPADM

## 2022-09-17 RX ORDER — LACTULOSE 10 G/15ML
20 SOLUTION ORAL 3 TIMES DAILY
Status: DISCONTINUED | OUTPATIENT
Start: 2022-09-17 | End: 2022-09-22 | Stop reason: HOSPADM

## 2022-09-17 RX ORDER — MAGNESIUM SULFATE HEPTAHYDRATE 40 MG/ML
4 INJECTION, SOLUTION INTRAVENOUS AS NEEDED
Status: DISCONTINUED | OUTPATIENT
Start: 2022-09-17 | End: 2022-09-22 | Stop reason: HOSPADM

## 2022-09-17 RX ORDER — ATORVASTATIN CALCIUM 20 MG/1
20 TABLET, FILM COATED ORAL NIGHTLY
Status: DISCONTINUED | OUTPATIENT
Start: 2022-09-17 | End: 2022-09-22 | Stop reason: HOSPADM

## 2022-09-17 RX ORDER — ONDANSETRON 4 MG/1
4 TABLET, FILM COATED ORAL EVERY 6 HOURS PRN
Status: DISCONTINUED | OUTPATIENT
Start: 2022-09-17 | End: 2022-09-22 | Stop reason: HOSPADM

## 2022-09-17 RX ADMIN — VENLAFAXINE HYDROCHLORIDE 150 MG: 75 CAPSULE, EXTENDED RELEASE ORAL at 08:51

## 2022-09-17 RX ADMIN — SODIUM CHLORIDE 500 ML: 9 INJECTION, SOLUTION INTRAVENOUS at 01:40

## 2022-09-17 RX ADMIN — ATORVASTATIN CALCIUM 20 MG: 20 TABLET, FILM COATED ORAL at 21:11

## 2022-09-17 RX ADMIN — EMPAGLIFLOZIN 10 MG: 10 TABLET, FILM COATED ORAL at 08:54

## 2022-09-17 RX ADMIN — Medication 10 ML: at 21:11

## 2022-09-17 RX ADMIN — PANTOPRAZOLE SODIUM 40 MG: 40 TABLET, DELAYED RELEASE ORAL at 11:48

## 2022-09-17 RX ADMIN — ATORVASTATIN CALCIUM 20 MG: 20 TABLET, FILM COATED ORAL at 01:40

## 2022-09-17 RX ADMIN — MAGNESIUM SULFATE HEPTAHYDRATE 4 G: 40 INJECTION, SOLUTION INTRAVENOUS at 18:29

## 2022-09-17 RX ADMIN — INSULIN LISPRO 4 UNITS: 100 INJECTION, SOLUTION INTRAVENOUS; SUBCUTANEOUS at 11:48

## 2022-09-17 RX ADMIN — LEVOTHYROXINE SODIUM 75 MCG: 0.07 TABLET ORAL at 05:11

## 2022-09-17 RX ADMIN — INSULIN LISPRO 2 UNITS: 100 INJECTION, SOLUTION INTRAVENOUS; SUBCUTANEOUS at 08:51

## 2022-09-17 RX ADMIN — RIFAXIMIN 200 MG: 200 TABLET ORAL at 08:54

## 2022-09-17 RX ADMIN — INSULIN LISPRO 4 UNITS: 100 INJECTION, SOLUTION INTRAVENOUS; SUBCUTANEOUS at 17:10

## 2022-09-17 RX ADMIN — LACTULOSE 20 G: 20 SOLUTION ORAL at 08:51

## 2022-09-17 RX ADMIN — RIFAXIMIN 200 MG: 200 TABLET ORAL at 21:11

## 2022-09-17 RX ADMIN — LACTULOSE 20 G: 20 SOLUTION ORAL at 17:10

## 2022-09-17 RX ADMIN — Medication 10 ML: at 01:40

## 2022-09-17 RX ADMIN — Medication 10 ML: at 08:51

## 2022-09-17 RX ADMIN — LACTULOSE 20 G: 20 SOLUTION ORAL at 21:11

## 2022-09-17 RX ADMIN — METOPROLOL SUCCINATE 50 MG: 50 TABLET, EXTENDED RELEASE ORAL at 08:51

## 2022-09-17 NOTE — PLAN OF CARE
Goal Outcome Evaluation:  Plan of Care Reviewed With: patient        Progress: no change  Outcome Evaluation: PT evaluation completed. Pt mobility limited by orthostatic hypotension. At this time, pt not safely able to ambulate, current discharge recommendation is 24/7 assist.

## 2022-09-17 NOTE — PROGRESS NOTES
[facility] Medicine Services  PROGRESS NOTE    Patient Name: Gwyn Fuchs  : 1950  MRN: [id number]    Date of Admission: 2022  Primary Care Physician: Riley Mckoy DO    Subjective   Subjective     CC:  Orthostatic hypotension    HPI:  Patient is still having some dizziness this morning though improved.  Denies any shortness of breath or abdominal swelling.  Orthostatics better with holding medications    ROS:  General: denies fevers or chills  CV: denies chest pain  Resp: denies shortness of breath  Abd: denies abd pain, nausea      Objective   Objective     Vital Signs:   Temp:  [97.6 °F (36.4 °C)-97.9 °F (36.6 °C)] 97.9 °F (36.6 °C)  Heart Rate:  [79-97] 90  Resp:  [13-17] 13  BP: ()/(47-87) 160/87     Physical Exam:  Constitutional: No acute distress, awake, alert  Respiratory: Clear to auscultation bilaterally, respiratory effort normal   Cardiovascular: RRR, no murmurs, rubs, or gallops  Gastrointestinal: Positive bowel sounds, soft, nontender, nondistended  Musculoskeletal: No bilateral ankle edema  Psychiatric: Appropriate affect, cooperative  Neurologic: Oriented x 3, no focal neurological deficits  Skin: No rashes      Results Reviewed:  LAB RESULTS:      Lab 09/17/22  0522 09/16/22  1908 09/15/22  0423 09/14/22  0551   WBC 6.02 8.18 5.54 5.86   HEMOGLOBIN 10.4* 11.1* 12.1* 11.6*   HEMATOCRIT 33.1* 34.4* 38.6 36.0*   PLATELETS 120* 141 136* 118*   NEUTROS ABS  --  5.29  --   --    IMMATURE GRANS (ABS)  --  0.06*  --   --    LYMPHS ABS  --  1.31  --   --    MONOS ABS  --  1.30*  --   --    EOS ABS  --  0.18  --   --    MCV 86.4 85.8 88.9 85.7         Lab 09/17/22  0522 09/16/22  1908 09/15/22  0423 22  0542 22  0400   SODIUM 135* 136 138 137 135*   POTASSIUM 4.2 4.0 3.3* 4.1 4.1   CHLORIDE 100 99 98 101 99   CO2 22.0 23.0 25.0 24.0 24.0   ANION GAP 13.0 14.0 15.0 12.0 12.0   BUN 25* 25* 17 18 20   CREATININE 1.42* 1.67* 1.31* 1.18 1.10   EGFR  52.5* 43.2* 57.8* 65.6 71.3   GLUCOSE 184* 85 151* 215* 324*   CALCIUM 8.5* 9.0 9.2 9.2 8.7   MAGNESIUM 1.9 1.9  --   --   --          Lab 09/16/22 1908 09/14/22  0542   TOTAL PROTEIN 7.8 8.0   ALBUMIN 3.20* 3.20*   GLOBULIN 4.6 4.8   ALT (SGPT) 61* 68*   AST (SGOT) 70* 72*   BILIRUBIN 0.2 0.3   ALK PHOS 158* 172*         Lab 09/16/22 1908   PROBNP 109.7   TROPONIN T <0.010                 Brief Urine Lab Results     None          Microbiology Results Abnormal     None          XR Chest 1 View    Result Date: 9/16/2022  DATE OF EXAM: 9/16/2022 7:58 PM  PROCEDURE: XR CHEST 1 VW-  INDICATIONS: Syncope.  COMPARISON: Chest x-ray 9/12/2022  TECHNIQUE: Single frontal view of the chest.  FINDINGS: Mild increased conspicuity of small right pleural effusion with associated right lung base opacities, likely associated atelectasis. Stable cardiomediastinal silhouette within normal limits. The left lung is clear. There is no pneumothorax. No acute osseous findings.      Impression: Mild increased conspicuity of small right pleural effusion and associated right base opacities, likely associated atelectasis.  This report was finalized on 9/16/2022 8:41 PM by Lei Lizarraga MD.        Results for orders placed during the hospital encounter of 09/05/22    Adult Transesophageal Echo (DALE) W/ Cont if Necessary Per Protocol    Interpretation Summary  · The right ventricular cavity is small in size.  · Global hyokinesis with EF 35%  · Moderate MR  · Mild AI      I have reviewed the medications:  Scheduled Meds:atorvastatin, 20 mg, Oral, Nightly  empagliflozin, 10 mg, Oral, Daily  insulin lispro, 0-7 Units, Subcutaneous, TID AC  lactulose, 20 g, Oral, TID  levothyroxine, 75 mcg, Oral, Q AM  metoprolol succinate XL, 50 mg, Oral, Q24H  pantoprazole, 40 mg, Oral, Daily  rifAXIMin, 200 mg, Oral, BID  sacubitril-valsartan, 1 tablet, Oral, Q12H  sodium chloride, 10 mL, Intravenous, Q12H  venlafaxine XR, 150 mg, Oral, Daily With  Breakfast      Continuous Infusions:   PRN Meds:.•  acetaminophen **OR** acetaminophen **OR** acetaminophen  •  dextrose  •  dextrose  •  glucagon (human recombinant)  •  magnesium sulfate **OR** magnesium sulfate **OR** magnesium sulfate  •  ondansetron **OR** ondansetron  •  potassium chloride **OR** potassium chloride **OR** potassium chloride  •  sodium chloride  •  sodium chloride    Assessment & Plan   Assessment & Plan     Active Hospital Problems    Diagnosis  POA   • Orthostatic hypotension [I95.1]  Yes   • Chronic systolic heart failure (CMS/HCC) [I50.22]  Yes   • Other cirrhosis of liver (HCC) [K74.69]  Yes   • Coronary artery disease involving native coronary artery of native heart without angina pectoris [I25.10]  Yes   • Recurrent right pleural effusion [J90]  Yes   • HTN (hypertension) [I10]  Yes   • Type 2 diabetes mellitus without complication, with long-term current use of insulin (HCC) [E11.9, Z79.4]  Not Applicable   • Impaired renal function [N28.9]  Yes      Resolved Hospital Problems   No resolved problems to display.       Gwyn Fuchs is a 72 y.o. male with PMH significant for HTN, HLD, CAD (s/p stents), chronic systolic CHF, insulin-dependent DMII, CKD II and LEZAMA cirrhosis with chronic anemia/thrombocytopenia. Patient was admitted to [facility] 9/5-9/15 secondary to recurrent R pleural effusion, Decompensated Cirrhosis, and CHF who presents with orthostatic hypotension.  During his previous admission multiple medications were adjusted.    Symptomatic Orthostatic Hypotension  -Patient started on Lasix 40mg, Spironolactone 100mg, and Entresto 49-51mg BID on last admit.  Suspect his orthostatic hypotension may be related to his multiple medications  -s/p IVF  -Decrease Metoprolol to 50mg xL from 100mg   -Cards consult pending, follows with Dr Osorio   -Consider GI consult as they placed patient on Lasix/Spironolactone combo last admit due to LEZAMA Cirrhosis and Hepatic Hydrothorax.  -PT/OT  evaluation. Also discussed use of compression stockings     LEZAMA cirrhosis   - GI saw last admit. Recommend high-protein, low sodium (< 2g Na/day) diet, frequent high-protein snacks and high-protein bedtime snack.  - Continue Xifaxan, Lactulose and PPI  - Holding Lasix and Spironolactone as clinically dehydrated, will consider resuming once orthostasis has improved  - GI felt patient is a poor candidate for TIPS due to pulmonary hypertension and hepatic encephalopathy   - Plan is to follow up with [facility] in 3 weeks.      Chronic systolic CHF, LVEF 30-35%  Severe mitral valve regurgitation  CAD s/p remote stenting / history of MI  Essential hypertension  Hyperlipidemia  - Hold Entresto due to Orthostasis   - Decrease dose of Metoprolol to 50mg daily   - Consult to Cardiology for further medication adjustment   - Echo as above      Recent admit for recurrent R pleural effusion   -GI felt likely secondary to hepatic hydrothorax   -S/P outpatient large-volume thoracentesis by [facility] pulmonology on 8/18 and 8/24 with ~2L removed each time  - IR thoracentesis on 9/6/22 with 2000mL of transudative fluid removed. Cytology benign  - ECHO 9/6/22: LVEF 30-35% with with severe MR, LV dysfunction. DALE performed on 9/14- global HK with EF 35%, Mod MR, mild AI.   -Followed by Dr Osorio last admit    -GI evaluated and did not recommend chest tube for the treatment of hepatic hydrothorax, as this can lead to massive protein-electrolyte depletion, infection, renal failure and bleeding.    Insulin-dependent DMII  - Hgb A1c 9.3%  - Continue Jardiance, started last admit  - LSDDI For now      Probable CKD II  - Cr 1.67 elevated on presentation, now trending down  - Gentle IVF  - Holding Entresto, Lasix, and Spironolactone   - BMP in AM      Hypothyroidism  -Continue Levothyroxine      Hypomagnesemia  -Replace per protocol      DVT prophylaxis:  Mechanical          DVT prophylaxis:  Mechanical DVT prophylaxis orders are present.      AM-PAC 6 Clicks Score (PT): 21 (09/17/22 4286)    CODE STATUS:   Code Status and Medical Interventions:   Ordered at: 09/17/22 0117     Level Of Support Discussed With:    Patient     Code Status (Patient has no pulse and is not breathing):    CPR (Attempt to Resuscitate)     Medical Interventions (Patient has pulse or is breathing):    Full Support     This patient's problems and plans were partially entered by my partner and updated as appropriate by me 09/17/22. Today is my first day evaluating this patient's active medical problems. I Personally reviewed chart and adjusted note to reflect daily changes in management/clinical condition    Juana Shrestha MD  09/17/22

## 2022-09-17 NOTE — HOME HEALTH
Phone call from patients daughter Lorena who notified patient had been readmitted to hospital during the night.

## 2022-09-17 NOTE — CONSULTS
Date of Hospital Visit: 22  Encounter Provider: Jessica Connor PA-C  Place of Service: [facility]  Patient Name: Gwyn Fuchs  :1950  Referral Provider: Juana Shrestha MD  Primary Care Provider: Riley Mckoy DO    Chief complaint/Reason for Consultation: Orthostatic hypotension/syncope    Problem List:  1. Systolic heart failure  1. Echo 2022: EF 30-35%, severe MR global LV hypokinesis  2. DALE 2022, Dr. Osorio: Global hypokinesis with EF 35%, moderate MR  2. Coronary artery disease  1. Stent   2. Stent   3. Mitral regurgitation, severe  4. LEZAMA cirrhosis  5. Hypertension  6. Recurrent pleural effusions      History of Present Illness:  Patient is a 72-year-old male with past medical history significant for coronary artery disease status post stents, hypertension, dyslipidemia, chronic systolic heart failure, insulin-dependent type 2 diabetes LEZAMA cirrhosis who we are seeing in consultation today for orthostatic hypotension.  He is a primary patient of Dr. Osorio/Nicolas.  He was admitted to [facility] -9/15 secondary to recurrent right pleural effusion, decompensated cirrhosis and CHF.  He was initiated on multiple medications and multiple medication doses were adjusted.    Upon discussion with the patient, he appears confused.  He is unsure what happened yesterday or the events surrounding his admission.  He denies any chest pain, shortness of breath, palpitations, orthopnea, and edema.  I spoke with the nurse who notes that he has had no recurrent episodes of orthostasis since he has been on the floor.  He did ambulate to the bathroom while I was in the room without any symptoms.  His medications have been on hold.    Patient's family in the room reported that he has had multiple episodes of extreme dizziness and near syncope however always have someone to get a hold of him and he has not had any fall or collapse.  He did not have any episodes in the  hospital since his medications were held.  Patient is currently very comfortable and denies chest pain or shortness of breath.  Walked to the bathroom and back without problems.    Past Medical History:   Diagnosis Date   • Coronary artery disease    • Diabetes mellitus (HCC)    • Hyperlipidemia    • Hypertension    • Myocardial infarction (HCC)        Past Surgical History:   Procedure Laterality Date   • KNEE SURGERY Right        Medications Prior to Admission   Medication Sig Dispense Refill Last Dose   • empagliflozin (JARDIANCE) 10 MG tablet tablet Take 1 tablet by mouth Daily. 30 tablet 0    • furosemide (LASIX) 40 MG tablet Take 1 tablet by mouth Daily. 30 tablet 0    • insulin lispro protamine-insulin lispro (humaLOG 75-25) (75-25) 100 UNIT/ML suspension injection Inject 30 Units under the skin into the appropriate area as directed Every Morning.      • insulin lispro protamine-insulin lispro (humaLOG 75-25) (75-25) 100 UNIT/ML suspension injection Inject 25 Units under the skin into the appropriate area as directed Every Night.      • lactulose (CHRONULAC) 10 GM/15ML solution Take 20 g by mouth 3 (Three) Times a Day.      • levothyroxine (SYNTHROID, LEVOTHROID) 75 MCG tablet Take 75 mcg by mouth Daily.      • metoprolol succinate XL (TOPROL-XL) 100 MG 24 hr tablet Take 1 tablet by mouth Daily. 30 tablet 11    • pantoprazole (PROTONIX) 40 MG EC tablet Take 1 tablet by mouth Daily. 30 tablet 0    • rifAXIMin (XIFAXAN) 200 MG tablet Take 200 mg by mouth 2 (Two) Times a Day.      • sacubitril-valsartan (ENTRESTO) 49-51 MG tablet Take 1 tablet by mouth Every 12 (Twelve) Hours. 60 tablet 11    • simvastatin (ZOCOR) 40 MG tablet Take 40 mg by mouth Every Night.      • spironolactone (ALDACTONE) 100 MG tablet Take 1 tablet by mouth Daily. 30 tablet 0    • venlafaxine XR (EFFEXOR-XR) 150 MG 24 hr capsule Take 1 capsule by mouth Daily With Breakfast. 30 capsule 0        Social History     Socioeconomic History   •  "Marital status:    Tobacco Use   • Smoking status: Former Smoker     Types: Cigars   • Smokeless tobacco: Never Used   Vaping Use   • Vaping Use: Never used   Substance and Sexual Activity   • Alcohol use: Not Currently     Comment: 2-3 drinks of vodka weekly   • Drug use: No       Family History   Problem Relation Age of Onset   • Heart failure Father    • Cancer Brother         Leukemia       REVIEW OF SYSTEMS:     12 point ROS was performed and is Negative except as outlined in HPI     Objective:     Vitals:    09/17/22 0300 09/17/22 0750 09/17/22 0800 09/17/22 0830   BP: 135/71 160/87     BP Location: Left arm Right arm     Patient Position: Lying Sitting     Pulse: 82 82 94 90   Resp: 14 13     Temp: 97.6 °F (36.4 °C) 97.9 °F (36.6 °C)     TempSrc: Oral Oral     SpO2: 93% 92% 93% 94%   Weight:       Height:         Body mass index is 22.78 kg/m².  Flowsheet Rows    Flowsheet Row First Filed Value   Admission Height 180.3 cm (71\") Documented at 09/16/2022 1845   Admission Weight 75.8 kg (167 lb) Documented at 09/16/2022 1845          Physical Exam   General: No acute distress, somewhat confused  Skin: Skin is warm and dry. No obvious cyanosis, erythema or pallor.   HEENT: Atraumatic, normocephalic, no conjunctival pallor, no scleral icterus.   Neck: Supple, no JVD. Normal carotid upstrokes, no bruits.    Chest:No respiratory distress. No chest wall tenderness. Breath sounds are normal. No wheezes, rhonchi or rales.  Cardiovascular: Normal S1 and S2, no murmur, gallop or rub. PMI is not displaced.    Pulses:Radial and pedal pulses are 2+ and symmetric.    Abdomen: Soft, nontender, normal bowel sounds.   Musculoskeletal/Extremities:  No clubbing, cyanosis or edema. No gross deformity.   Neurological: Alert and oriented to person, place, and time, no gross focal deficits.   Psychiatric: Normal mood and affect.Speech and behavior is normal.    Lab Review:                Results from last 7 days   Lab Units " 09/17/22  0522   SODIUM mmol/L 135*   POTASSIUM mmol/L 4.2   CHLORIDE mmol/L 100   CO2 mmol/L 22.0   BUN mg/dL 25*   CREATININE mg/dL 1.42*   GLUCOSE mg/dL 184*   CALCIUM mg/dL 8.5*     Results from last 7 days   Lab Units 09/16/22 1908   TROPONIN T ng/mL <0.010     Results from last 7 days   Lab Units 09/17/22 0522   WBC 10*3/mm3 6.02   HEMOGLOBIN g/dL 10.4*   HEMATOCRIT % 33.1*   PLATELETS 10*3/mm3 120*         Results from last 7 days   Lab Units 09/17/22  0522   MAGNESIUM mg/dL 1.9         Imaging Results (Last 24 Hours)     Procedure Component Value Units Date/Time    XR Chest 1 View [[id number]] Collected: 09/16/22 2040     Updated: 09/16/22 2044    Narrative:      DATE OF EXAM: 9/16/2022 7:58 PM     PROCEDURE: XR CHEST 1 VW-     INDICATIONS: Syncope.      COMPARISON: Chest x-ray 9/12/2022     TECHNIQUE: Single frontal view of the chest.     FINDINGS:  Mild increased conspicuity of small right pleural effusion with  associated right lung base opacities, likely associated atelectasis.  Stable cardiomediastinal silhouette within normal limits. The left lung  is clear. There is no pneumothorax. No acute osseous findings.       Impression:      Mild increased conspicuity of small right pleural effusion and  associated right base opacities, likely associated atelectasis.     This report was finalized on 9/16/2022 8:41 PM by Lei Lizarraga MD.              EKG: NSR with no acute ST-T changes.  Mildly prolonged QT     Assessment:   1. Orthostatic hypotension/near syncope, likely related to medications.  Medications have been on hold and he has had no recurrent symptoms.  2. Chronic systolic heart failure, EF 30-35% by echo 9/2022, recently started on multiple medications, appears euvolemic and well compensated.  3. History of coronary artery disease status post stent placement 2008, 2015, no recent ischemic evaluation, no chest pain or shortness of breath  4. LEZAMA cirrhosis  5. Dyslipidemia    Plan:   1. Continue  metoprolol 50 mg daily and Jardiance 10 mg daily for management of heart failure, CM and CAD..  2. Do not restart Entresto, monitor for orthostasis.  He will benefit from some degree of permissive hypertension however if his blood pressure is continuously higher than 140 without any evidence of orthostasis we can consider adding losartan 25 mg daily and further titrate guideline directed medical therapy as tolerated.  3. Hold off Lasix and spironolactone for now, patient is fairly euvolemic and has elevated creatinine.  Lasix can be used as needed for volume overload manifesting as worsening dyspnea, edema or weight gain of more than 2 pounds per 24 hours.  4. Continue statin for dyslipidemia.  5. CHF navigator consulted for heart failure.  6. Thank you for the consult, we will follow.    Scribed for Iris Barriga MD by Jessica Connor PA-C. 9/17/2022  10:29 EDT     IIris MD, personally performed the services described in this documentation as scribed by the above named individual in my presence, and it is both accurate and complete.  9/17/2022  15:58 EDT

## 2022-09-17 NOTE — PLAN OF CARE
Goal Outcome Evaluation:   Patient arrived to floor around 0115 from ED. VSS. Ambulating well without any symptoms of syncope or hypotension with A x1. Another bolus given per orders. No complaints of pain. A/O x4 but very forgetful. Impulsive and tends to stand out of bed without calling for assistance. Will continue to monitor as patient progresses towards discharge.

## 2022-09-17 NOTE — THERAPY EVALUATION
Patient Name: Gwyn Fuchs  : 1950    MRN: [id number]                              Today's Date: 2022       Admit Date: 2022    Visit Dx:     ICD-10-CM ICD-9-CM   1. Orthostatic hypotension  I95.1 458.0   2. Syncope, unspecified syncope type  R55 780.2     Patient Active Problem List   Diagnosis   • Other cirrhosis of liver (HCC)   • Coronary artery disease involving native coronary artery of native heart without angina pectoris   • Recurrent right pleural effusion   • HTN (hypertension)   • Type 2 diabetes mellitus without complication, with long-term current use of insulin (HCC)   • Impaired renal function   • Chronic systolic heart failure (CMS/HCC)   • Orthostatic hypotension     Past Medical History:   Diagnosis Date   • Coronary artery disease    • Diabetes mellitus (HCC)    • Hyperlipidemia    • Hypertension    • Myocardial infarction (HCC)      Past Surgical History:   Procedure Laterality Date   • KNEE SURGERY Right       General Information     Row Name 22 1104          Physical Therapy Time and Intention    Document Type evaluation  -SH     Mode of Treatment individual therapy;physical therapy  -     Row Name 22 1104          General Information    Patient Profile Reviewed yes  -SH     Prior Level of Function --  Recent admit and facility resident  -     Existing Precautions/Restrictions fall;orthostatic hypotension   -SH     Barriers to Rehab medically complex;previous functional deficit  -SH     Row Name 22 1104          Living Environment    People in Home facility resident  -SH     Row Name 22 1104          Home Main Entrance    Number of Stairs, Main Entrance none  -SH     Stair Railings, Main Entrance none  -SH     Row Name 22 1104          Stairs Within Home, Primary    Number of Stairs, Within Home, Primary none  -SH     Row Name 22 1104          Cognition    Orientation Status (Cognition) oriented to;person;situation;time;disoriented  to;place  -     Row Name 09/17/22 1104          Safety Issues, Functional Mobility    Safety Issues Affecting Function (Mobility) other (see comments)  -     Impairments Affecting Function (Mobility) other (see comments)  -     Comment, Safety Issues/Impairments (Mobility) Pt blood pressure dropped significantly from supine to stand. Pt demonstrated no awareness of change.  -           User Key  (r) = Recorded By, (t) = Taken By, (c) = Cosigned By    Initials Name Provider Type     Sindy Shipman, PT Physical Therapist               Mobility     Row Name 09/17/22 1104          Bed Mobility    Bed Mobility supine-sit  -     Supine-Sit [place] (Bed Mobility) verbal cues;nonverbal cues (demo/gesture);minimum assist (75% patient effort)  -     Assistive Device (Bed Mobility) bed rails;head of bed elevated  -     Comment, (Bed Mobility) Pt reaching for PT, pt edu in sequencing, use of bed rail, and pt UEs guided to bedrail.  -     Row Name 09/17/22 1104          Transfers    Comment, (Transfers) Orthostatic BPs taken.  -     Row Name 09/17/22 1104          Bed-Chair Transfer    Bed-Chair [place] (Transfers) verbal cues;contact guard  -     Assistive Device (Bed-Chair Transfers) --  none  -     Comment, (Bed-Chair Transfer) Pt performed stand pivot from EOB to chair with CGA.  -     Row Name 09/17/22 1104          Sit-Stand Transfer    Sit-Stand [place] (Transfers) verbal cues;contact guard  -     Assistive Device (Sit-Stand Transfers) other (see comments)  none  -     Row Name 09/17/22 1104          Gait/Stairs (Locomotion)    [place] Level (Gait) unable to assess  -     Distance in Feet (Gait) 2  -     Comment, (Gait/Stairs) Significant drop in BP note with change in position, determined unsafe for ambulation this session due to history of falls prior.  -           User Key  (r) = Recorded By, (t) = Taken By, (c) = Cosigned By    Initials Name Provider Type     Sindy Lambert PT Physical Therapist               Obj/Interventions     Row Name 09/17/22 1104          Balance    Balance Assessment sitting static balance;standing static balance  -SH     Static Sitting Balance contact guard  -SH     Position, Sitting Balance unsupported  -SH     Static Standing Balance contact guard  -SH     Position/Device Used, Standing Balance unsupported  -SH     Row Name 09/17/22 1104          Sensory Assessment (Somatosensory)    Sensory Assessment (Somatosensory) sensation intact  -           User Key  (r) = Recorded By, (t) = Taken By, (c) = Cosigned By    Initials Name Provider Type    Sindy Lambert, PT Physical Therapist               Goals/Plan     Row Name 09/17/22 1104          Bed Mobility Goal 1 (PT)    Activity/Assistive Device (Bed Mobility Goal 1, PT) bed mobility activities, all  -SH     [place] Level/Cues Needed (Bed Mobility Goal 1, PT) standby assist  -SH     Time Frame (Bed Mobility Goal 1, PT) 10 days;long term goal (LTG)  -SH     Progress/Outcomes (Bed Mobility Goal 1, PT) goal ongoing  -     Row Name 09/17/22 1104          Transfer Goal 1 (PT)    Activity/Assistive Device (Transfer Goal 1, PT) sit-to-stand/stand-to-sit;bed-to-chair/chair-to-bed  -SH     [place] Level/Cues Needed (Transfer Goal 1, PT) standby assist  -SH     Time Frame (Transfer Goal 1, PT) long term goal (LTG);10 days  -SH     Progress/Outcome (Transfer Goal 1, PT) goal ongoing  -           User Key  (r) = Recorded By, (t) = Taken By, (c) = Cosigned By    Initials Name Provider Type    Sindy Lambert, PT Physical Therapist               Clinical Impression     Row Name 09/17/22 1104          Pain    Pretreatment Pain Rating 0/10 - no pain  -     Posttreatment Pain Rating 0/10 - no pain  -     Row Name 09/17/22 1104          Plan of Care Review    Plan of Care Reviewed With patient  -     Outcome Evaluation PT evaluation completed. Pt mobility limited by  orthostatic hypotension. At this time, pt not safely able to ambulate, current discharge recommendation is 24/7 assist.  -     Row Name 09/17/22 1104          Therapy Assessment/Plan (PT)    Patient/Family Therapy Goals Statement (PT) Pt did not state.  -     Rehab Potential (PT) good, to achieve stated therapy goals  -     Criteria for Skilled Interventions Met (PT) yes;meets criteria;skilled treatment is necessary  -     Therapy Frequency (PT) daily  -     Predicted Duration of Therapy Intervention (PT) 10 days  -     Row Name 09/17/22 1104          Vital Signs    Pre Systolic BP Rehab 148  -SH     Pre Treatment Diastolic BP 77  -SH     Intra Systolic BP Rehab 106  -SH     Intra Treatment Diastolic BP 68  -SH     Post Systolic BP Rehab 88  -SH     Post Treatment Diastolic BP 54  -SH     Pre Patient Position Supine  -SH     Intra Patient Position Sitting  -SH     Post Patient Position Standing  -     Row Name 09/17/22 1104          Positioning and Restraints    Pre-Treatment Position in bed  -SH     Post Treatment Position chair  -SH     In Chair notified nsg;reclined;call light within reach;encouraged to call for assist;exit alarm on;on mechanical lift sling  -           User Key  (r) = Recorded By, (t) = Taken By, (c) = Cosigned By    Initials Name Provider Type     Sindy Shipman, PT Physical Therapist               Outcome Measures     Row Name 09/17/22 1104 09/17/22 0850       How much help from another person do you currently need...    Turning from your back to your side while in flat bed without using bedrails? 3  - 4  -MW    Moving from lying on back to sitting on the side of a flat bed without bedrails? 3  - 4  -MW    Moving to and from a bed to a chair (including a wheelchair)? 3  - 4  -MW    Standing up from a chair using your arms (e.g., wheelchair, bedside chair)? 3  - 3  -MW    Climbing 3-5 steps with a railing? 3  - 3  -MW    To walk in hospital room? 3  - 3  -MW     -[facility] 6 Clicks Score (PT) 18  - 21  -MW    Highest level of mobility 6 --> Walked 10 steps or more  - 6 --> Walked 10 steps or more  -MW    Row Name 09/17/22 1104          Functional Assessment    Outcome Measure Options -[facility] 6 Clicks Basic Mobility (PT)  -           User Key  (r) = Recorded By, (t) = Taken By, (c) = Cosigned By    Initials Name Provider Type     Sindy Shipman, PT Physical Therapist    Zeenat Yuen, RN Registered Nurse                             Physical Therapy Education                 Title: PT OT SLP Therapies (In Progress)     Topic: Physical Therapy (In Progress)     Point: Mobility training (In Progress)     Learning Progress Summary           Patient Acceptance, E, NR by  at 9/17/2022 1104    Comment: Safe mobility                   Point: Home exercise program (Not Started)     Learner Progress:  Not documented in this visit.          Point: Body mechanics (Not Started)     Learner Progress:  Not documented in this visit.          Point: Precautions (In Progress)     Learning Progress Summary           Patient Acceptance, E, NR by  at 9/17/2022 1104    Comment: Safe mobility                               User Key     Initials Effective Dates Name Provider Type [facility] 06/16/21 -  Sindy Shipman, PT Physical Therapist PT              PT Recommendation and Plan     Plan of Care Reviewed With: patient  Progress: no change  Outcome Evaluation: PT evaluation completed. Pt mobility limited by orthostatic hypotension. At this time, pt not safely able to ambulate, current discharge recommendation is 24/7 assist.     Time Calculation:    PT Charges     Row Name 09/17/22 1104             Time Calculation    Start Time 1104  -      PT Non-Billable Time (min) 16 min  -      PT Received On 09/17/22  -      PT Goal Re-Cert Due Date 09/27/22  -              Time Calculation- PT    Total Timed Code Minutes- PT 23 minute(s)  -              Timed Charges    56634  - PT Therapeutic Activity Minutes 23  -SH              Total Minutes    Timed Charges Total Minutes 23  -SH       Total Minutes 23  -SH            User Key  (r) = Recorded By, (t) = Taken By, (c) = Cosigned By    Initials Name Provider Type    Sindy Lambert, PT Physical Therapist              Therapy Charges for Today     Code Description Service Date Service Provider Modifiers Qty    89161607761  PT THERAPEUTIC ACT EA 15 MIN 9/17/2022 Sindy Shipman, PT GP 2    [id number]  PT EVAL MOD COMPLEXITY 2 9/17/2022 Sindy Shipman, PT GP 1          PT G-Codes  Outcome Measure Options: AM-PAC 6 Clicks Basic Mobility (PT)  AM-PAC 6 Clicks Score (PT): 18    Sindy Shipman PT  9/17/2022

## 2022-09-17 NOTE — H&P
[facility] Medicine Services  HISTORY AND PHYSICAL    Patient Name: Gwyn Fuchs  : 1950  MRN: [id number]  Primary Care Physician: Riley Mckoy DO  Date of admission: 2022      Subjective   Subjective     Chief Complaint:  Syncope     HPI:  Gwyn Fuchs is a 72 y.o. male with PMH significant for HTN, HLD, CAD (s/p stents), chronic systolic CHF, insulin-dependent DMII, CKD II and LEZAMA cirrhosis with chronic anemia/thrombocytopenia. Patient was admitted to [facility] -9/15 secondary to recurrent R pleural effusion, Decompensated Cirrhosis, and CHF. Several of his medications were adjusted. He was discharged back to his [facility]. Family present in room today describe patient having syncopal episodes when he stands up. Patient states when he stands, he blacks out and doesn't remember much. He denies CP or SOB. Patient was placed on several new medications, including diuretics and Entresto.   He was given 500cc bolus in ED. Hospital medicine asked to admit.     Review of Systems   Constitutional: Positive for activity change and fatigue. Negative for appetite change, chills and fever.   HENT: Negative for congestion and sore throat.    Eyes: Negative for visual disturbance.   Respiratory: Negative for cough, shortness of breath and wheezing.    Cardiovascular: Negative for chest pain, palpitations and leg swelling.   Gastrointestinal: Negative for abdominal pain, constipation, diarrhea, nausea and vomiting.   Genitourinary: Negative for dysuria and urgency.   Musculoskeletal: Negative for back pain and myalgias.   Skin: Negative for pallor and rash.   Neurological: Positive for dizziness, weakness and light-headedness.   Psychiatric/Behavioral: Negative for confusion.        Personal History     Past Medical History:   Diagnosis Date   • Coronary artery disease    • Diabetes mellitus (HCC)    • Hyperlipidemia    • Hypertension    • Myocardial infarction (HCC)   "            Past Surgical History:   Procedure Laterality Date   • KNEE SURGERY Right        Family History:  family history includes Cancer in his brother; Heart failure in his father. Otherwise pertinent FHx was reviewed and unremarkable.     Social History:  reports that he has quit smoking. His smoking use included cigars. He has never used smokeless tobacco. He reports previous alcohol use. He reports that he does not use drugs.  Social History     Social History Narrative    Hx of \"social\" cigar use; prior EtOH use of 2-3 beers every other weekend; early 2022 moved into assisted living and family took away his car keys for early memory issues       Medications:  Available home medication information reviewed.  (Not in a hospital admission)      Allergies   Allergen Reactions   • Penicillins        Objective   Objective     Vital Signs:   Temp:  [97.8 °F (36.6 °C)] 97.8 °F (36.6 °C)  Heart Rate:  [79-97] 81  Resp:  [16] 16  BP: ()/(47-73) 150/73       Physical Exam   Constitutional: Awake, alert, NAD, non-toxic   Eyes: PERRLA, sclerae anicteric, no conjunctival injection  HENT: NCAT, mucous membranes dry   Neck: Supple, no thyromegaly, no lymphadenopathy, trachea midline  Respiratory: Clear to auscultation bilaterally, nonlabored respirations   Cardiovascular: RRR, no murmurs, rubs, or gallops, palpable pedal pulses bilaterally  Gastrointestinal: Positive bowel sounds, soft, nontender, nondistended  Musculoskeletal: No bilateral ankle edema, no clubbing or cyanosis to extremities  Psychiatric: Appropriate affect, cooperative  Neurologic: Oriented x 3, strength symmetric in all extremities, Cranial Nerves grossly intact to confrontation, speech clear  Skin: No rashes    Result Review:  I have personally reviewed the results from the time of this admission to 9/16/2022 23:15 EDT and agree with these findings:  [x]  Laboratory list / accordion  []  Microbiology  [x]  Radiology  [x]  EKG/Telemetry   []  " Cardiology/Vascular   []  Pathology  [x]  Old records  []  Other:      LAB RESULTS:      Lab 09/16/22  1908 09/15/22  0423 09/14/22  0551 09/10/22  0502   WBC 8.18 5.54 5.86 5.47   HEMOGLOBIN 11.1* 12.1* 11.6* 10.9*   HEMATOCRIT 34.4* 38.6 36.0* 33.3*   PLATELETS 141 136* 118* 99*   NEUTROS ABS 5.29  --   --  3.70   IMMATURE GRANS (ABS) 0.06*  --   --  0.03   LYMPHS ABS 1.31  --   --  0.74   MONOS ABS 1.30*  --   --  0.73   EOS ABS 0.18  --   --  0.25   MCV 85.8 88.9 85.7 84.5         Lab 09/16/22  1908 09/15/22  0423 09/14/22  0542 09/13/22  0400 09/12/22  0328   SODIUM 136 138 137 135* 134*   POTASSIUM 4.0 3.3* 4.1 4.1 4.0   CHLORIDE 99 98 101 99 99   CO2 23.0 25.0 24.0 24.0 24.0   ANION GAP 14.0 15.0 12.0 12.0 11.0   BUN 25* 17 18 20 25*   CREATININE 1.67* 1.31* 1.18 1.10 1.29*   EGFR 43.2* 57.8* 65.6 71.3 58.9*   GLUCOSE 85 151* 215* 324* 329*   CALCIUM 9.0 9.2 9.2 8.7 8.5*   MAGNESIUM 1.9  --   --   --   --          Lab 09/16/22 1908 09/14/22  0542   TOTAL PROTEIN 7.8 8.0   ALBUMIN 3.20* 3.20*   GLOBULIN 4.6 4.8   ALT (SGPT) 61* 68*   AST (SGOT) 70* 72*   BILIRUBIN 0.2 0.3   ALK PHOS 158* 172*         Lab 09/16/22 1908   PROBNP 109.7   TROPONIN T <0.010                     Microbiology Results (last 10 days)     ** No results found for the last 240 hours. **          XR Chest 1 View    Result Date: 9/16/2022  DATE OF EXAM: 9/16/2022 7:58 PM  PROCEDURE: XR CHEST 1 VW-  INDICATIONS: Syncope.  COMPARISON: Chest x-ray 9/12/2022  TECHNIQUE: Single frontal view of the chest.  FINDINGS: Mild increased conspicuity of small right pleural effusion with associated right lung base opacities, likely associated atelectasis. Stable cardiomediastinal silhouette within normal limits. The left lung is clear. There is no pneumothorax. No acute osseous findings.      Impression: Mild increased conspicuity of small right pleural effusion and associated right base opacities, likely associated atelectasis.  This report was  finalized on 9/16/2022 8:41 PM by Lei Lizarraga MD.        Results for orders placed during the hospital encounter of 09/05/22    Adult Transesophageal Echo (DALE) W/ Cont if Necessary Per Protocol    Interpretation Summary  · The right ventricular cavity is small in size.  · Global hyokinesis with EF 35%  · Moderate MR  · Mild AI      Assessment & Plan   Assessment & Plan     Active Hospital Problems    Diagnosis  POA   • Orthostatic hypotension [I95.1]  Yes   • Chronic systolic heart failure (CMS/HCC) [I50.22]  Yes   • Other cirrhosis of liver (HCC) [K74.69]  Yes   • Coronary artery disease involving native coronary artery of native heart without angina pectoris [I25.10]  Yes   • Recurrent right pleural effusion [J90]  Yes   • HTN (hypertension) [I10]  Yes   • Type 2 diabetes mellitus without complication, with long-term current use of insulin (HCC) [E11.9, Z79.4]  Not Applicable   • Impaired renal function [N28.9]  Yes       Symptomatic Orthostatic Hypotension  -Patient started on Lasix 40mg, Spironolactone 100mg, and Entresto 49-51mg BID on last admit. Patient is likely on too much medication.   -Patient has taken his Lasix and Aldactone today, hold further doses. Gentle IVF, received 500cc in ED, another 500cc ordered for a total of 1L   -Decrease Metoprolol to 50mg xL from 100mg   -Consult to Cardiology, follows with Dr Osorio   -Consider GI consult as they placed patient on Lasix/Spironolactone combo last admit due to LEZAMA Cirrhosis and Hepatic Hydrothorax   -PT/OT evaluation. Also discussed use of compression stockings    Recent admit for recurrent R pleural effusion  -GI felt likely secondary to hepatic hydrothorax   -S/P outpatient large-volume thoracentesis by [facility] pulmonology on 8/18 and 8/24 with ~2L removed each time  - IR thoracentesis on 9/6/22 with 2000mL of transudative fluid removed. Cytology benign  - ECHO 9/6/22: LVEF 30-35% with with severe MR, LV dysfunction. DALE performed on 9/14- global HK  with EF 35%, Mod MR, mild AI.   -Followed by Dr Osorio last admit    -GI evaluated and did not recommend chest tube for the treatment of hepatic hydrothorax, as this can lead to massive protein-electrolyte depletion, infection, renal failure and bleeding.  -CXR this evening with small effusion, on RA      LEZAMA cirrhosis   - GI saw last admit. Recommend high-protein, low sodium (< 2g Na/day) diet, frequent high-protein snacks and high-protein bedtime snack.  - Continue Xifaxan, Lactulose and PPI  - Holding Lasix and Spironolactone as clinically dehydrated  - GI felt patient is a poor candidate for TIPS due to pulmonary hypertension and hepatic encephalopathy   - Ammonia normal   - Plan is to follow up with [facility] in 3 weeks.      Chronic systolic CHF, LVEF 30-35%  Severe mitral valve regurgitation  CAD s/p remote stenting / history of MI  Essential hypertension  Hyperlipidemia  - Hold Entresto due to Orthostasis   - Decrease dose of Metoprolol to 50mg daily   - Consult to Cardiology for further medication adjustment   - Echo as above      Insulin-dependent DMII  - Hgb A1c 9.3%  - Continue Jardiance, started last admit  - LSDDI For now      Probable CKD II  - Cr 1.67 today, Baseline 1.1-1.3  - Gentle IVF  - Holding Entresto, Lasix, and Spironolactone   - BMP in AM      Hypothyroidism  -Continue Levothyroxine     Hypomagnesemia  -Replace per protocol     DVT prophylaxis:  Mechanical      CODE STATUS:  Full Code   There are no questions and answers to display.         Roseline Wilkes, DO  09/16/22

## 2022-09-18 LAB
ANION GAP SERPL CALCULATED.3IONS-SCNC: 13 MMOL/L (ref 5–15)
BASOPHILS # BLD AUTO: 0.03 10*3/MM3 (ref 0–0.2)
BASOPHILS NFR BLD AUTO: 0.5 % (ref 0–1.5)
BUN SERPL-MCNC: 23 MG/DL (ref 8–23)
BUN/CREAT SERPL: 17.8 (ref 7–25)
CALCIUM SPEC-SCNC: 8.4 MG/DL (ref 8.6–10.5)
CHLORIDE SERPL-SCNC: 101 MMOL/L (ref 98–107)
CO2 SERPL-SCNC: 22 MMOL/L (ref 22–29)
CREAT SERPL-MCNC: 1.29 MG/DL (ref 0.76–1.27)
DEPRECATED RDW RBC AUTO: 49.1 FL (ref 37–54)
EGFRCR SERPLBLD CKD-EPI 2021: 58.9 ML/MIN/1.73
EOSINOPHIL # BLD AUTO: 0.21 10*3/MM3 (ref 0–0.4)
EOSINOPHIL NFR BLD AUTO: 3.5 % (ref 0.3–6.2)
ERYTHROCYTE [DISTWIDTH] IN BLOOD BY AUTOMATED COUNT: 15.9 % (ref 12.3–15.4)
GLUCOSE BLDC GLUCOMTR-MCNC: 289 MG/DL (ref 70–130)
GLUCOSE BLDC GLUCOMTR-MCNC: 335 MG/DL (ref 70–130)
GLUCOSE BLDC GLUCOMTR-MCNC: 344 MG/DL (ref 70–130)
GLUCOSE SERPL-MCNC: 310 MG/DL (ref 65–99)
HCT VFR BLD AUTO: 32.8 % (ref 37.5–51)
HGB BLD-MCNC: 10.4 G/DL (ref 13–17.7)
IMM GRANULOCYTES # BLD AUTO: 0.04 10*3/MM3 (ref 0–0.05)
IMM GRANULOCYTES NFR BLD AUTO: 0.7 % (ref 0–0.5)
LYMPHOCYTES # BLD AUTO: 0.82 10*3/MM3 (ref 0.7–3.1)
LYMPHOCYTES NFR BLD AUTO: 13.8 % (ref 19.6–45.3)
MCH RBC QN AUTO: 27.2 PG (ref 26.6–33)
MCHC RBC AUTO-ENTMCNC: 31.7 G/DL (ref 31.5–35.7)
MCV RBC AUTO: 85.9 FL (ref 79–97)
MONOCYTES # BLD AUTO: 1.01 10*3/MM3 (ref 0.1–0.9)
MONOCYTES NFR BLD AUTO: 17 % (ref 5–12)
NEUTROPHILS NFR BLD AUTO: 3.82 10*3/MM3 (ref 1.7–7)
NEUTROPHILS NFR BLD AUTO: 64.5 % (ref 42.7–76)
NRBC BLD AUTO-RTO: 0 /100 WBC (ref 0–0.2)
PLATELET # BLD AUTO: 105 10*3/MM3 (ref 140–450)
PMV BLD AUTO: 11.9 FL (ref 6–12)
POTASSIUM SERPL-SCNC: 4.5 MMOL/L (ref 3.5–5.2)
RBC # BLD AUTO: 3.82 10*6/MM3 (ref 4.14–5.8)
SODIUM SERPL-SCNC: 136 MMOL/L (ref 136–145)
WBC NRBC COR # BLD: 5.93 10*3/MM3 (ref 3.4–10.8)

## 2022-09-18 PROCEDURE — G0378 HOSPITAL OBSERVATION PER HR: HCPCS

## 2022-09-18 PROCEDURE — 85025 COMPLETE CBC W/AUTO DIFF WBC: CPT | Performed by: INTERNAL MEDICINE

## 2022-09-18 PROCEDURE — 99225 PR SBSQ OBSERVATION CARE/DAY 25 MINUTES: CPT | Performed by: INTERNAL MEDICINE

## 2022-09-18 PROCEDURE — 99213 OFFICE O/P EST LOW 20 MIN: CPT | Performed by: INTERNAL MEDICINE

## 2022-09-18 PROCEDURE — 80048 BASIC METABOLIC PNL TOTAL CA: CPT | Performed by: INTERNAL MEDICINE

## 2022-09-18 PROCEDURE — 82962 GLUCOSE BLOOD TEST: CPT

## 2022-09-18 PROCEDURE — 97165 OT EVAL LOW COMPLEX 30 MIN: CPT

## 2022-09-18 PROCEDURE — 63710000001 INSULIN LISPRO (HUMAN) PER 5 UNITS: Performed by: FAMILY MEDICINE

## 2022-09-18 RX ORDER — LOSARTAN POTASSIUM 50 MG/1
50 TABLET ORAL
Status: DISCONTINUED | OUTPATIENT
Start: 2022-09-18 | End: 2022-09-20

## 2022-09-18 RX ORDER — METOPROLOL SUCCINATE 100 MG/1
100 TABLET, EXTENDED RELEASE ORAL
Status: DISCONTINUED | OUTPATIENT
Start: 2022-09-19 | End: 2022-09-22 | Stop reason: HOSPADM

## 2022-09-18 RX ADMIN — METOPROLOL SUCCINATE 50 MG: 50 TABLET, EXTENDED RELEASE ORAL at 08:51

## 2022-09-18 RX ADMIN — EMPAGLIFLOZIN 10 MG: 10 TABLET, FILM COATED ORAL at 08:51

## 2022-09-18 RX ADMIN — Medication 10 ML: at 08:52

## 2022-09-18 RX ADMIN — LEVOTHYROXINE SODIUM 75 MCG: 0.07 TABLET ORAL at 06:16

## 2022-09-18 RX ADMIN — Medication 10 ML: at 21:26

## 2022-09-18 RX ADMIN — LACTULOSE 20 G: 20 SOLUTION ORAL at 21:25

## 2022-09-18 RX ADMIN — PANTOPRAZOLE SODIUM 40 MG: 40 TABLET, DELAYED RELEASE ORAL at 11:36

## 2022-09-18 RX ADMIN — LOSARTAN POTASSIUM 50 MG: 50 TABLET, FILM COATED ORAL at 11:36

## 2022-09-18 RX ADMIN — INSULIN LISPRO 5 UNITS: 100 INJECTION, SOLUTION INTRAVENOUS; SUBCUTANEOUS at 11:36

## 2022-09-18 RX ADMIN — VENLAFAXINE HYDROCHLORIDE 150 MG: 75 CAPSULE, EXTENDED RELEASE ORAL at 08:51

## 2022-09-18 RX ADMIN — RIFAXIMIN 200 MG: 200 TABLET ORAL at 08:51

## 2022-09-18 RX ADMIN — LACTULOSE 20 G: 20 SOLUTION ORAL at 08:51

## 2022-09-18 RX ADMIN — RIFAXIMIN 200 MG: 200 TABLET ORAL at 21:25

## 2022-09-18 RX ADMIN — INSULIN LISPRO 4 UNITS: 100 INJECTION, SOLUTION INTRAVENOUS; SUBCUTANEOUS at 08:51

## 2022-09-18 RX ADMIN — INSULIN LISPRO 5 UNITS: 100 INJECTION, SOLUTION INTRAVENOUS; SUBCUTANEOUS at 16:58

## 2022-09-18 RX ADMIN — ATORVASTATIN CALCIUM 20 MG: 20 TABLET, FILM COATED ORAL at 21:25

## 2022-09-18 RX ADMIN — LACTULOSE 20 G: 20 SOLUTION ORAL at 16:06

## 2022-09-18 NOTE — PLAN OF CARE
Goal Outcome Evaluation:  Plan of Care Reviewed With: patient        Progress: improving  Outcome Evaluation: OT eval complete. Pt is pleasent and cooperative. Completes all bed mobility with CI and transfers indepedently. Pt is independent with all LBD. Noted cognitive deficits with short term memory. At this time no deficits noted that require skilled OT services. OT will d/c at this time. Recommend d/c back to [facility].

## 2022-09-18 NOTE — THERAPY EVALUATION
Patient Name: Gwyn Fuchs  : 1950    MRN: [id number]                              Today's Date: 2022       Admit Date: 2022    Visit Dx:     ICD-10-CM ICD-9-CM   1. Orthostatic hypotension  I95.1 458.0   2. Syncope, unspecified syncope type  R55 780.2     Patient Active Problem List   Diagnosis   • Other cirrhosis of liver (HCC)   • Coronary artery disease involving native coronary artery of native heart without angina pectoris   • Recurrent right pleural effusion   • HTN (hypertension)   • Type 2 diabetes mellitus without complication, with long-term current use of insulin (HCC)   • Impaired renal function   • Chronic systolic heart failure (CMS/HCC)   • Orthostatic hypotension     Past Medical History:   Diagnosis Date   • Coronary artery disease    • Diabetes mellitus (HCC)    • Hyperlipidemia    • Hypertension    • Myocardial infarction (HCC)      Past Surgical History:   Procedure Laterality Date   • KNEE SURGERY Right       General Information     Row Name 22 1502          OT Time and Intention    Document Type evaluation  -HK     Mode of Treatment occupational therapy  -     Row Name 22 1502          General Information    Patient Profile Reviewed yes  -HK     Prior Level of Function independent:;community mobility;all household mobility;gait;transfer;bed mobility;ADL's  per pt report however pt poor historian  -HK     Existing Precautions/Restrictions fall;orthostatic hypotension   -HK     Barriers to Rehab medically complex;previous functional deficit;cognitive status  -HK     Row Name 22 1502          Living Environment    People in Home facility resident  -     Row Name 22 1502          Home Main Entrance    Number of Stairs, Main Entrance none  -HK     Stair Railings, Main Entrance none  -HK     Row Name 22 1502          Stairs Within Home, Primary    Number of Stairs, Within Home, Primary none  -HK     Stair Railings, Within Home, Primary none  -HK      Row Name 09/18/22 1502          Cognition    Orientation Status (Cognition) oriented x 4  -HK     Row Name 09/18/22 1502          Safety Issues, Functional Mobility    Safety Issues Affecting Function (Mobility) safety precautions follow-through/compliance;safety precaution awareness;insight into deficits/self-awareness;impulsivity;judgment;problem-solving;sequencing abilities  -HK     Comment, Safety Issues/Impairments (Mobility) Continues to have drop in BP with postion change. Pt declines dizziness.  -HK           User Key  (r) = Recorded By, (t) = Taken By, (c) = Cosigned By    Initials Name Provider Type    HK Laura Ni, OT Occupational Therapist                 Mobility/ADL's     Row Name 09/18/22 1507          Bed Mobility    Bed Mobility scooting/bridging;supine-sit  -HK     Scooting/Bridging [place] (Bed Mobility) modified independence  -HK     Supine-Sit [place] (Bed Mobility) modified independence  -HK     Assistive Device (Bed Mobility) bed rails;head of bed elevated  -HK     Comment, (Bed Mobility) Pt advanced to EOB with CI  -HK     Row Name 09/18/22 1507          Transfers    Transfers sit-stand transfer;bed-chair transfer  -HK     Bed-Chair [place] (Transfers) independent  -HK     Sit-Stand [place] (Transfers) independent  -HK     Row Name 09/18/22 1507          Functional Mobility    Functional Mobility- Ind. Level supervision required  -HK     Functional Mobility- Device --  none  -HK     Functional Mobility-Distance (Feet) 30  -HK     Functional Mobility- Comment Ambualtes in room with no assist from OT and no AE  -HK     Row Name 09/18/22 1507          Activities of Daily Living    BADL Assessment/Intervention lower body dressing  -HK     Row Name 09/18/22 1507          Lower Body Dressing Assessment/Training    [place] Level (Lower Body Dressing) doff;don;socks;independent  -HK     Position (Lower Body Dressing) unsupported sitting  -HK           User Key  (r) =  Recorded By, (t) = Taken By, (c) = Cosigned By    Initials Name Provider Type    HK Laura Ni, OT Occupational Therapist               Obj/Interventions     Row Name 09/18/22 1509          Sensory Assessment (Somatosensory)    Sensory Assessment (Somatosensory) sensation intact  -HK     Row Name 09/18/22 1509          Vision Assessment/Intervention    Visual Impairment/Limitations WFL  -HK     Row Name 09/18/22 1509          Range of Motion Comprehensive    General Range of Motion no range of motion deficits identified  -HK     Comment, General Range of Motion B UE WNL  -HK     Row Name 09/18/22 1509          Strength Comprehensive (MMT)    General Manual Muscle Testing (MMT) Assessment no strength deficits identified  -HK     Comment, General Manual Muscle Testing (MMT) Assessment B UE 5/5  -HK     Row Name 09/18/22 1509          Motor Skills    Motor Skills coordination  -HK     Coordination WFL  -HK     [facility] Name 09/18/22 1509          Balance    Balance Assessment sitting static balance;sitting dynamic balance;standing static balance;standing dynamic balance  -HK     Static Sitting Balance independent  -HK     Dynamic Sitting Balance independent  -HK     Position, Sitting Balance unsupported;sitting edge of bed  -HK     Static Standing Balance independent  -HK     Dynamic Standing Balance supervision  -HK     Position/Device Used, Standing Balance unsupported  -HK     Balance Interventions sitting;occupation based/functional task  -HK           User Key  (r) = Recorded By, (t) = Taken By, (c) = Cosigned By    Initials Name Provider Type    HK Laura Ni, OT Occupational Therapist               Goals/Plan    No documentation.                Clinical Impression     Row Name 09/18/22 1510          Pain Assessment    Pretreatment Pain Rating 0/10 - no pain  -HK     Posttreatment Pain Rating 0/10 - no pain  -HK     Row Name 09/18/22 1510          Plan of Care Review    Plan of Care Reviewed With patient  -HK      Progress improving  -HK     Outcome Evaluation OT eval complete. Pt is pleasent and cooperative. Completes all bed mobility with CI and transfers indepedently. Pt is independent with all LBD. Noted cognitive deficits with short term memory. At this time no deficits noted that require skilled OT services. OT will d/c at this time. Recommend d/c back to [facility].  -     Row Name 09/18/22 1510          Therapy Assessment/Plan (OT)    Criteria for Skilled Therapeutic Interventions Met (OT) no problems identified which require skilled intervention  -HK     Therapy Frequency (OT) evaluation only  -HK     Row Name 09/18/22 1510          Therapy Plan Review/Discharge Plan (OT)    Anticipated Discharge Disposition (OT) assisted living  -HK     Row Name 09/18/22 1510          Vital Signs    Pre Systolic BP Rehab 156  -HK     Pre Treatment Diastolic BP 79  supine; in sitting 116/66  -HK     Intra Systolic BP Rehab 95  -HK     Intra Treatment Diastolic BP 50  standing  -HK     Post Systolic BP Rehab 144  -HK     Post Treatment Diastolic BP 96  reclined with legs elevated  -HK     O2 Delivery Pre Treatment room air  -HK     O2 Delivery Intra Treatment room air  -HK     O2 Delivery Post Treatment room air  -HK     Pre Patient Position Supine  -HK     Intra Patient Position Standing  -HK     Post Patient Position Sitting  -HK     Row Name 09/18/22 1510          Positioning and Restraints    Pre-Treatment Position in bed  -HK     Post Treatment Position chair  -HK     In Chair notified nsg;reclined;call light within reach;encouraged to call for assist;exit alarm on;waffle cushion  -HK           User Key  (r) = Recorded By, (t) = Taken By, (c) = Cosigned By    Initials Name Provider Type     Laura Ni, OT Occupational Therapist               Outcome Measures     Row Name 09/18/22 1518          How much help from another is currently needed...    Putting on and taking off regular lower body clothing? 4  -HK     Bathing  (including washing, rinsing, and drying) 4  -HK     Toileting (which includes using toilet bed pan or urinal) 4  -HK     Putting on and taking off regular upper body clothing 4  -HK     Taking care of personal grooming (such as brushing teeth) 4  -HK     Eating meals 4  -HK     AM-PAC 6 Clicks Score (OT) 24  -     Row Name 09/18/22 0800          How much help from another person do you currently need...    Turning from your back to your side while in flat bed without using bedrails? 3  -BC     Moving from lying on back to sitting on the side of a flat bed without bedrails? 3  -BC     Moving to and from a bed to a chair (including a wheelchair)? 3  -BC     Standing up from a chair using your arms (e.g., wheelchair, bedside chair)? 3  -BC     Climbing 3-5 steps with a railing? 3  -BC     To walk in hospital room? 3  -BC     AM-PAC 6 Clicks Score (PT) 18  -BC     Highest level of mobility 6 --> Walked 10 steps or more  -BC     Row Name 09/18/22 1518          Functional Assessment    Outcome Measure Options AM-PAC 6 Clicks Daily Activity (OT)  -           User Key  (r) = Recorded By, (t) = Taken By, (c) = Cosigned By    Initials Name Provider Type     Laura Ni, OT Occupational Therapist    BC Callie Kahn, RN Registered Nurse                Occupational Therapy Education                 Title: PT OT SLP Therapies (In Progress)     Topic: Occupational Therapy (In Progress)     Point: ADL training (Done)     Description:   Instruct learner(s) on proper safety adaptation and remediation techniques during self care or transfers.   Instruct in proper use of assistive devices.              Learning Progress Summary           Patient Acceptance, E,TB,D, VU by  at 9/18/2022 1518                   Point: Home exercise program (Not Started)     Description:   Instruct learner(s) on appropriate technique for monitoring, assisting and/or progressing therapeutic exercises/activities.              Learner Progress:  Not  documented in this visit.          Point: Precautions (Done)     Description:   Instruct learner(s) on prescribed precautions during self-care and functional transfers.              Learning Progress Summary           Patient Acceptance, E,TB,D, VU by  at 9/18/2022 1518                   Point: Body mechanics (Done)     Description:   Instruct learner(s) on proper positioning and spine alignment during self-care, functional mobility activities and/or exercises.              Learning Progress Summary           Patient Acceptance, E,TB,D, VU by  at 9/18/2022 1518                               User Key     Initials Effective Dates Name Provider Type [facility] 06/16/21 -  Laura Ni OT Occupational Therapist OT              OT Recommendation and Plan  Therapy Frequency (OT): evaluation only  Plan of Care Review  Plan of Care Reviewed With: patient  Progress: improving  Outcome Evaluation: OT eval complete. Pt is pleasent and cooperative. Completes all bed mobility with CI and transfers indepedently. Pt is independent with all LBD. Noted cognitive deficits with short term memory. At this time no deficits noted that require skilled OT services. OT will d/c at this time. Recommend d/c back to [facility].     Time Calculation:    Time Calculation- OT     Row Name 09/18/22 1405             Time Calculation- OT    OT Start Time 1405  -HK      OT Received On 09/18/22  -      OT Goal Re-Cert Due Date 09/28/22  -              Untimed Charges    OT Eval/Re-eval Minutes 55  -HK              Total Minutes    Untimed Charges Total Minutes 55  -HK       Total Minutes 55  -HK            User Key  (r) = Recorded By, (t) = Taken By, (c) = Cosigned By    Initials Name Provider Type     Laura Ni, OT Occupational Therapist              Therapy Charges for Today     Code Description Service Date Service Provider Modifiers Qty    [id number]  OT EVAL LOW COMPLEXITY 4 9/18/2022 Laura Ni, OT GO 1               Laura BETHEA  Last, OT  9/18/2022

## 2022-09-18 NOTE — PLAN OF CARE
Goal Outcome Evaluation:  Plan of Care Reviewed With: patient        Progress: improving   Pt is alert and oriented x4; forgetful. Vss on room air. Normal sinus on tele. Pt has slept off and on throughout the shift. No c/o of pain. Pt is impulsive; tries to get out of bed to go to bathroom and sets off alarms. No c/o of dizziness when getting up. Up with stand-by assist and gait belt to bathroom; voiding spontaneously. Several Bms this shift. Will continue to monitor.

## 2022-09-18 NOTE — PROGRESS NOTES
[facility]  PROGRESS NOTE    Patient Name: Gwyn Fuchs  : 1950  MRN: [id number]    Date of Admission: 2022  Primary Care Physician: Riley Mckoy DO    Subjective   Subjective     CC:  orthostasis    HPI:  Bps trended up. Patient has no complaints of dizziness. He denies any shortness of breath or abd swelling.    ROS:  General: denies fevers or chills  CV: denies chest pain  Resp: denies shortness of breath  Abd: denies abd pain, nausea      Objective   Objective     Vital Signs:   Temp:  [97.7 °F (36.5 °C)-98.2 °F (36.8 °C)] 97.9 °F (36.6 °C)  Heart Rate:  [] 93  Resp:  [15-18] 18  BP: ()/(52-90) 174/83     Physical Exam:  Constitutional: No acute distress, awake, alert  Respiratory: Clear to auscultation bilaterally, respiratory effort normal on room air  Cardiovascular: RRR, no murmurs, rubs, or gallops  Gastrointestinal: Positive bowel sounds, soft, nontender, nondistended  Musculoskeletal: No bilateral ankle edema  Psychiatric: Appropriate affect, cooperative  Neurologic: Oriented x 3, no focal deficits      Results Reviewed:  LAB RESULTS:      Lab 09/18/22  0645 09/17/22  0522 09/16/22  1908 09/15/22  0423 22  0551   WBC 5.93 6.02 8.18 5.54 5.86   HEMOGLOBIN 10.4* 10.4* 11.1* 12.1* 11.6*   HEMATOCRIT 32.8* 33.1* 34.4* 38.6 36.0*   PLATELETS 105* 120* 141 136* 118*   NEUTROS ABS 3.82  --  5.29  --   --    IMMATURE GRANS (ABS) 0.04  --  0.06*  --   --    LYMPHS ABS 0.82  --  1.31  --   --    MONOS ABS 1.01*  --  1.30*  --   --    EOS ABS 0.21  --  0.18  --   --    MCV 85.9 86.4 85.8 88.9 85.7         Lab 22  0645 09/17/22  0522 09/16/22  1908 09/15/22  0423 22  0542   SODIUM 136 135* 136 138 137   POTASSIUM 4.5 4.2 4.0 3.3* 4.1   CHLORIDE 101 100 99 98 101   CO2 22.0 22.0 23.0 25.0 24.0   ANION GAP 13.0 13.0 14.0 15.0 12.0   BUN 23 25* 25* 17 18   CREATININE 1.29* 1.42* 1.67* 1.31* 1.18   EGFR 58.9* 52.5* 43.2* 57.8*  65.6   GLUCOSE 310* 184* 85 151* 215*   CALCIUM 8.4* 8.5* 9.0 9.2 9.2   MAGNESIUM  --  1.9 1.9  --   --          Lab 09/16/22 1908 09/14/22  0542   TOTAL PROTEIN 7.8 8.0   ALBUMIN 3.20* 3.20*   GLOBULIN 4.6 4.8   ALT (SGPT) 61* 68*   AST (SGOT) 70* 72*   BILIRUBIN 0.2 0.3   ALK PHOS 158* 172*         Lab 09/16/22 1908   PROBNP 109.7   TROPONIN T <0.010                 Brief Urine Lab Results     None          Microbiology Results Abnormal     None          XR Chest 1 View    Result Date: 9/16/2022  DATE OF EXAM: 9/16/2022 7:58 PM  PROCEDURE: XR CHEST 1 VW-  INDICATIONS: Syncope.  COMPARISON: Chest x-ray 9/12/2022  TECHNIQUE: Single frontal view of the chest.  FINDINGS: Mild increased conspicuity of small right pleural effusion with associated right lung base opacities, likely associated atelectasis. Stable cardiomediastinal silhouette within normal limits. The left lung is clear. There is no pneumothorax. No acute osseous findings.      Impression: Mild increased conspicuity of small right pleural effusion and associated right base opacities, likely associated atelectasis.  This report was finalized on 9/16/2022 8:41 PM by Lei Lizarraga MD.        Results for orders placed during the hospital encounter of 09/05/22    Adult Transesophageal Echo (DALE) W/ Cont if Necessary Per Protocol    Interpretation Summary  · The right ventricular cavity is small in size.  · Global hyokinesis with EF 35%  · Moderate MR  · Mild AI      I have reviewed the medications:  Scheduled Meds:atorvastatin, 20 mg, Oral, Nightly  empagliflozin, 10 mg, Oral, Daily  insulin lispro, 0-7 Units, Subcutaneous, TID AC  lactulose, 20 g, Oral, TID  levothyroxine, 75 mcg, Oral, Q AM  metoprolol succinate XL, 50 mg, Oral, Q24H  pantoprazole, 40 mg, Oral, Daily  rifAXIMin, 200 mg, Oral, BID  sodium chloride, 10 mL, Intravenous, Q12H  venlafaxine XR, 150 mg, Oral, Daily With Breakfast      Continuous Infusions:   PRN Meds:.•  acetaminophen **OR**  acetaminophen **OR** acetaminophen  •  dextrose  •  dextrose  •  glucagon (human recombinant)  •  magnesium sulfate **OR** magnesium sulfate **OR** magnesium sulfate  •  ondansetron **OR** ondansetron  •  potassium chloride **OR** potassium chloride **OR** potassium chloride  •  sodium chloride  •  sodium chloride    Assessment & Plan   Assessment & Plan     Active Hospital Problems    Diagnosis  POA   • Orthostatic hypotension [I95.1]  Yes   • Chronic systolic heart failure (CMS/HCC) [I50.22]  Yes   • Other cirrhosis of liver (HCC) [K74.69]  Yes   • Coronary artery disease involving native coronary artery of native heart without angina pectoris [I25.10]  Yes   • Recurrent right pleural effusion [J90]  Yes   • HTN (hypertension) [I10]  Yes   • Type 2 diabetes mellitus without complication, with long-term current use of insulin (HCC) [E11.9, Z79.4]  Not Applicable   • Impaired renal function [N28.9]  Yes      Resolved Hospital Problems   No resolved problems to display.        Gwyn Fuchs is a 72 y.o. male with PMH significant for HTN, HLD, CAD (s/p stents), chronic systolic CHF, insulin-dependent DMII, CKD II and LEZAMA cirrhosis with chronic anemia/thrombocytopenia. Patient was admitted to [facility] 9/5-9/15 secondary to recurrent R pleural effusion, Decompensated Cirrhosis, and CHF who presents with orthostatic hypotension.  During his previous admission multiple medications were adjusted.     Symptomatic Orthostatic Hypotension, improving  -Patient started on Lasix 40mg, Spironolactone 100mg, and Entresto 49-51mg BID on last admit.  Suspect his orthostatic hypotension may be related to his multiple medications  -s/p IVF  -Decrease Metoprolol to 50mg xL from 100mg   -Cardiology consulted. Hold entresto, lasix and spironolactone. Would benefit from permissive hypertension; however, if BP sustains greater then 140 without orthostasis, consider adding losartan. Hold lasix and spironolactone.   -PT/OT evaluation. Also  discussed use of compression stockings  - hypotension improved, now hypertensive.  - check orthostatics today.      LEZAMA cirrhosis   - GI saw last admit. Recommend high-protein, low sodium (< 2g Na/day) diet, frequent high-protein snacks and high-protein bedtime snack.  - Continue Xifaxan, Lactulose and PPI  - Holding Lasix and Spironolactone as clinically dehydrated  - GI felt patient is a poor candidate for TIPS due to pulmonary hypertension and hepatic encephalopathy   - Plan is to follow up with [facility] in 3 weeks.      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 Cytology benign  - ECHO 9/6/22: LVEF 30-35% with with severe MR, LV dysfunction. DALE performed on 9/14- global HK with EF 35%, Mod MR, mild AI.   -Followed by Dr Osorio last admit    -GI evaluated and did not recommend chest tube for the treatment of hepatic hydrothorax, as this can lead to massive protein-electrolyte depletion, infection, renal failure and bleeding.     Insulin-dependent DMII  - Hgb A1c 9.3%  - Continue Jardiance, started last admit  - LSDDI For now      Probable CKD II  - Cr 1.67 elevated on presentation, now trending down  - Gentle IVF  - Holding Entresto, Lasix, and Spironolactone   - BMP in AM      Hypothyroidism  -Continue Levothyroxine      Hypomagnesemia  -Replace per protocol      Called and discussed with daughter.     DVT prophylaxis:  Mechanical        Expected Discharge Location and  Transportation: home, family  Expected Discharge Date: 9/19/22    DVT prophylaxis:  Mechanical DVT prophylaxis orders are present.     AM-PAC 6 Clicks Score (PT): 18 (09/17/22 1104)    CODE STATUS:   Code Status and Medical Interventions:   Ordered at: 09/17/22 0117     Level Of Support Discussed With:    Patient     Code Status (Patient has no pulse and is not breathing):    CPR (Attempt to Resuscitate)     Medical Interventions (Patient has pulse or is breathing):    Full Support     I have prepared this progress note with copied portions of the prior day's progress note of my own authorship to preserve accuracy and maintain consistency of documentation. I have reviewed these portions and edited them for correctness. I verify that the above documentation accurately and truly represents the evaluation and management performed on today's date.       Juana Shrestha MD  09/18/22

## 2022-09-18 NOTE — PROGRESS NOTES
"  [facility]  PROGRESS NOTE    Date of Admission: 9/16/2022  Date of Service: 09/18/22    Primary Care Physician: Riley Mckoy DO    Chief Complaint: Orthostatic hypotension    Subjective    Patient is tachycardic and hypertensive this morning.  He has had no recurrent orthostatic hypotension since being on the floor.  He denies chest pain or shortness of breath.  Denies dizziness or presyncope.  Resting comfortably without complaints.    Objective   Vitals: /83 (BP Location: Left arm, Patient Position: Lying)   Pulse 93   Temp 97.9 °F (36.6 °C) (Oral)   Resp 18   Ht 180.3 cm (71\")   Wt 74.1 kg (163 lb 4.8 oz)   SpO2 93%   BMI 22.78 kg/m²     Physical Exam:  General: No apparent distress.  Neck: no JVD.  Chest:No respiratory distress, breath sounds are normal. No wheezes,  rhonchi or rales.  Cardiovascular: Normal S1 and S2, no murmur, gallop or rub.    Extremities: No edema.    Results:  Results from last 7 days   Lab Units 09/18/22  0645 09/17/22  0522 09/16/22 1908   WBC 10*3/mm3 5.93 6.02 8.18   HEMOGLOBIN g/dL 10.4* 10.4* 11.1*   HEMATOCRIT % 32.8* 33.1* 34.4*   PLATELETS 10*3/mm3 105* 120* 141     Results from last 7 days   Lab Units 09/18/22  0645 09/17/22  0522 09/16/22  1908   SODIUM mmol/L 136 135* 136   POTASSIUM mmol/L 4.5 4.2 4.0   CHLORIDE mmol/L 101 100 99   CO2 mmol/L 22.0 22.0 23.0   BUN mg/dL 23 25* 25*   CREATININE mg/dL 1.29* 1.42* 1.67*   GLUCOSE mg/dL 310* 184* 85      Lab Results   Component Value Date    AST 70 (H) 09/16/2022    ALT 61 (H) 09/16/2022                         Results from last 7 days   Lab Units 09/16/22 1908   TROPONIN T ng/mL <0.010     Results from last 7 days   Lab Units 09/16/22  1908   PROBNP pg/mL 109.7         Intake/Output Summary (Last 24 hours) at 9/18/2022 1119  Last data filed at 9/18/2022 0700  Gross per 24 hour   Intake 960 ml   Output --   Net 960 ml       I personally reviewed the patient's " EKG/Telemetry data and new clinical data    Current Medications:  atorvastatin, 20 mg, Oral, Nightly  empagliflozin, 10 mg, Oral, Daily  insulin lispro, 0-7 Units, Subcutaneous, TID AC  lactulose, 20 g, Oral, TID  levothyroxine, 75 mcg, Oral, Q AM  losartan, 50 mg, Oral, Q24H  [START ON 9/19/2022] metoprolol succinate XL, 100 mg, Oral, Q24H  pantoprazole, 40 mg, Oral, Daily  rifAXIMin, 200 mg, Oral, BID  sodium chloride, 10 mL, Intravenous, Q12H  venlafaxine XR, 150 mg, Oral, Daily With Breakfast           Assessment:   1. Orthostatic hypotension/near syncope, likely related to medications.  Medications have been on hold and he has had no recurrent symptoms.  2. Chronic systolic heart failure, EF 30-35% by echo 9/2022, recently started on multiple medications, appears euvolemic and well compensated.  3. History of coronary artery disease status post stent placement 2008, 2015, no recent ischemic evaluation, no chest pain or shortness of breath  4. LEZAMA cirrhosis  5. Dyslipidemia     Plan:   1. Increase metoprolol to 100 mg daily for better heart rate control.  Monitor for orthostasis.  Also add losartan 50 mg daily.  2. Continue Jardiance 10 mg daily and statin for management of heart failure and diabetes.  3. Due to orthostasis, we will allow for some degree of permissive hypertension.  Avoid Entresto for now.  4. Will continue to titrate guideline directed medical therapy as tolerated.  5. Patient appears euvolemic.  No need for Lasix or spironolactone at this time.  Continue to monitor volume status.  Can resume Lasix as needed for volume overload.  6. Increase activities as tolerated.  7. Dr. Valenzuela will see him for follow-up tomorrow.    Jessica Connor PA-C    I have seen and examined the patient, case was discussed with the physician extender, reviewed the above note, necessary changes were made and I agree with the final note.   Iris Barriga MD, [facility], [facility]

## 2022-09-18 NOTE — PLAN OF CARE
Goal Outcome Evaluation:  Plan of Care Reviewed With: patient           Outcome Evaluation: VSS. Alert, oriented x4, forgetful at times. Ambulates assist x1.

## 2022-09-18 NOTE — PLAN OF CARE
Goal Outcome Evaluation:           Progress: improving  Outcome Evaluation: O x4, but forgetful, impulsive, orthostatics checked prior to ambulation, BP drops to 90/60s, this evening let pt  place for couple of min, after dropping to 90/52 HR 99 on initial stand, went back up to 109/62, , pt has ambulated twice (400ft each time), asymptomatic, only complains of fatigue. Mag replaced, several episodes of BM after lactulose

## 2022-09-19 LAB
GLUCOSE BLDC GLUCOMTR-MCNC: 194 MG/DL (ref 70–130)
GLUCOSE BLDC GLUCOMTR-MCNC: 252 MG/DL (ref 70–130)
GLUCOSE BLDC GLUCOMTR-MCNC: 300 MG/DL (ref 70–130)
GLUCOSE BLDC GLUCOMTR-MCNC: 309 MG/DL (ref 70–130)
MAGNESIUM SERPL-MCNC: 2.3 MG/DL (ref 1.6–2.4)

## 2022-09-19 PROCEDURE — G0378 HOSPITAL OBSERVATION PER HR: HCPCS

## 2022-09-19 PROCEDURE — 99225 PR SBSQ OBSERVATION CARE/DAY 25 MINUTES: CPT | Performed by: NURSE PRACTITIONER

## 2022-09-19 PROCEDURE — 97530 THERAPEUTIC ACTIVITIES: CPT

## 2022-09-19 PROCEDURE — 63710000001 INSULIN DETEMIR PER 5 UNITS: Performed by: NURSE PRACTITIONER

## 2022-09-19 PROCEDURE — 83735 ASSAY OF MAGNESIUM: CPT | Performed by: INTERNAL MEDICINE

## 2022-09-19 PROCEDURE — 82962 GLUCOSE BLOOD TEST: CPT

## 2022-09-19 PROCEDURE — 63710000001 INSULIN LISPRO (HUMAN) PER 5 UNITS: Performed by: FAMILY MEDICINE

## 2022-09-19 RX ORDER — SODIUM CHLORIDE 9 MG/ML
75 INJECTION, SOLUTION INTRAVENOUS ONCE
Status: COMPLETED | OUTPATIENT
Start: 2022-09-19 | End: 2022-09-20

## 2022-09-19 RX ADMIN — INSULIN LISPRO 2 UNITS: 100 INJECTION, SOLUTION INTRAVENOUS; SUBCUTANEOUS at 08:20

## 2022-09-19 RX ADMIN — INSULIN DETEMIR 10 UNITS: 100 INJECTION, SOLUTION SUBCUTANEOUS at 21:26

## 2022-09-19 RX ADMIN — EMPAGLIFLOZIN 10 MG: 10 TABLET, FILM COATED ORAL at 08:21

## 2022-09-19 RX ADMIN — PANTOPRAZOLE SODIUM 40 MG: 40 TABLET, DELAYED RELEASE ORAL at 11:28

## 2022-09-19 RX ADMIN — LOSARTAN POTASSIUM 50 MG: 50 TABLET, FILM COATED ORAL at 08:20

## 2022-09-19 RX ADMIN — LEVOTHYROXINE SODIUM 75 MCG: 0.07 TABLET ORAL at 06:20

## 2022-09-19 RX ADMIN — VENLAFAXINE HYDROCHLORIDE 150 MG: 75 CAPSULE, EXTENDED RELEASE ORAL at 08:20

## 2022-09-19 RX ADMIN — RIFAXIMIN 200 MG: 200 TABLET ORAL at 08:21

## 2022-09-19 RX ADMIN — LACTULOSE 20 G: 20 SOLUTION ORAL at 16:13

## 2022-09-19 RX ADMIN — Medication 10 ML: at 08:27

## 2022-09-19 RX ADMIN — RIFAXIMIN 200 MG: 200 TABLET ORAL at 21:26

## 2022-09-19 RX ADMIN — LACTULOSE 20 G: 20 SOLUTION ORAL at 08:20

## 2022-09-19 RX ADMIN — INSULIN LISPRO 5 UNITS: 100 INJECTION, SOLUTION INTRAVENOUS; SUBCUTANEOUS at 11:28

## 2022-09-19 RX ADMIN — SODIUM CHLORIDE 75 ML/HR: 9 INJECTION, SOLUTION INTRAVENOUS at 14:27

## 2022-09-19 RX ADMIN — METOPROLOL SUCCINATE 100 MG: 100 TABLET, EXTENDED RELEASE ORAL at 08:20

## 2022-09-19 RX ADMIN — LACTULOSE 20 G: 20 SOLUTION ORAL at 21:26

## 2022-09-19 RX ADMIN — INSULIN LISPRO 5 UNITS: 100 INJECTION, SOLUTION INTRAVENOUS; SUBCUTANEOUS at 16:12

## 2022-09-19 RX ADMIN — ATORVASTATIN CALCIUM 20 MG: 20 TABLET, FILM COATED ORAL at 21:26

## 2022-09-19 RX ADMIN — Medication 10 ML: at 21:26

## 2022-09-19 NOTE — CASE MANAGEMENT/SOCIAL WORK
Discharge Planning Assessment  [facility]     Patient Name: Gwyn Fuchs  MRN: [id number]  Today's Date: 9/19/2022    Admit Date: 9/16/2022     Discharge Needs Assessment     Row Name 09/19/22 1401       Living Environment    People in Home alone    Current Living Arrangements independent living facility    Primary Care Provided by self    Provides Primary Care For no one    Family Caregiver if Needed child(josephine), adult    Able to Return to Prior Arrangements yes       Transition Planning    Transportation Anticipated family or friend will provide       Discharge Needs Assessment    Equipment Currently Used at Home glucometer    Current Discharge Risk lives alone               Discharge Plan     Row Name 09/19/22 1404       Plan    Plan Home    Patient/Family in Agreement with Plan yes    Plan Comments I met with Mr. Fuchs at the bedside. He lives alone in [facility]. He resides in an independent living facility and is independent with mobility and activities of daily living. He drives himself when leaving the home and is not current with any home or outpatient services. He has a glucometer at home but no other medical equipment. He denies any difficulty in obtaining or affording his medications. Mr. Fuchs anticipates going home at the time of discharge and states that his daughter will transport him at that time. Therapy recommends that he go to rehab but he does not want to go. I offered home health or outpatient therapy and Mr. Fuchs declined both of those also. No discharge needs identified at this time. Case management will continue to follow.    Final Discharge Disposition Code 01 - home or self-care              Continued Care and Services - Admitted Since 9/16/2022    Coordination has not been started for this encounter.     Selected Continued Care - Prior Encounters Includes selections from prior encounters from 6/18/2022 to 9/19/2022    Discharged on 9/15/2022 Admission date: 9/5/2022 - Discharge  disposition: Home or Self Care    Home Medical Care     Service Provider Selected Services Address Phone Fax Patient Preferred    [facility]  Home Health Services [address] [facility] [address] [phone] --                    Expected Discharge Date and Time     Expected Discharge Date Expected Discharge Time    Sep 26, 2022          Demographic Summary     Row Name 09/19/22 1400       General Information    General Information Comments Confirmed PCP to be Riley Valdez Medicare is insurer.               Functional Status     Row Name 09/19/22 1401       Functional Status, IADL    Medications independent    Meal Preparation independent    Housekeeping independent    Laundry independent    Shopping independent       Employment/    Employment Status retired               Psychosocial    No documentation.                Abuse/Neglect    No documentation.                Legal    No documentation.                Substance Abuse    No documentation.                Patient Forms    No documentation.                   Roshan Wright RN

## 2022-09-19 NOTE — PLAN OF CARE
Goal Outcome Evaluation:  Plan of Care Reviewed With: patient        Progress: improving   Pt is alert and oriented x4; forgetful. Vss on room air. Normal sinus on tele. Pt has slept off and on throughout the shift. No c/o of pain. Pt remains impulsive trying to get to the bathroom. Up with stand-by assist and gait belt to bathroom; voiding spontaneously. Multiple Bms this shift. Zguard applied to coccyx/bottom area. Will continue to monitor.

## 2022-09-19 NOTE — PROGRESS NOTES
" [facility] - Progress Note    Gwyn READ Delph  1950  [id number]/1    09/19/22, 09:11 EDT      Chief Complaint: Following for orthostasis    Subjective:   Doing well, slept well.  No current complaints of dyspnea, orthopnea.  No dizziness.  Per RN, multiple BMs.          Review of Systems:  Pertinent positives are listed above and in physical exam.  All others have been reviewed and are negative.    atorvastatin, 20 mg, Oral, Nightly  empagliflozin, 10 mg, Oral, Daily  insulin lispro, 0-7 Units, Subcutaneous, TID AC  lactulose, 20 g, Oral, TID  levothyroxine, 75 mcg, Oral, Q AM  losartan, 50 mg, Oral, Q24H  metoprolol succinate XL, 100 mg, Oral, Q24H  pantoprazole, 40 mg, Oral, Daily  rifAXIMin, 200 mg, Oral, BID  sodium chloride, 10 mL, Intravenous, Q12H  venlafaxine XR, 150 mg, Oral, Daily With Breakfast        Objective:  Vitals:   height is 180.3 cm (71\") and weight is 74.1 kg (163 lb 4.8 oz). His oral temperature is 97.7 °F (36.5 °C). His blood pressure is 154/88 and his pulse is 96. His respiration is 11 and oxygen saturation is 94%.       Intake/Output Summary (Last 24 hours) at 9/19/2022 0911  Last data filed at 9/18/2022 1700  Gross per 24 hour   Intake 600 ml   Output --   Net 600 ml       Physical Exam:  General:  WN, NAD, A and O x3.  CV:  Normal S1,S2. No murmur, rub, or gallop.  Resp:  CTA Fransico, equal, nonlabored.  Abd:  Soft, + BS, no organomegaly. Nontender to palpation.  Extrem:  No edema BLE, 2+ pedal/PT pulses.            Results from last 7 days   Lab Units 09/18/22  0645   WBC 10*3/mm3 5.93   HEMOGLOBIN g/dL 10.4*   HEMATOCRIT % 32.8*   PLATELETS 10*3/mm3 105*     Results from last 7 days   Lab Units 09/18/22  0645 09/17/22  0522 09/16/22  1908   SODIUM mmol/L 136   < > 136   POTASSIUM mmol/L 4.5   < > 4.0   CHLORIDE mmol/L 101   < > 99   CO2 mmol/L 22.0   < > 23.0   BUN mg/dL 23   < > 25*   CREATININE mg/dL 1.29*   < > 1.67*   CALCIUM mg/dL 8.4*   < > 9.0   BILIRUBIN mg/dL  --   --  " 0.2   ALK PHOS U/L  --   --  158*   ALT (SGPT) U/L  --   --  61*   AST (SGOT) U/L  --   --  70*   GLUCOSE mg/dL 310*   < > 85    < > = values in this interval not displayed.                 Results from last 7 days   Lab Units 09/16/22  1908   PROBNP pg/mL 109.7       Tele:  NSR/ST      Assessment:   1. Orthostatic hypotension/near syncope, likely related to medications/polypharmacy and decreased mobility.  Medications have been on hold without recurrent symptoms.  2. Chronic systolic heart failure, EF 30-35% by echo 9/2022, recently started on multiple medications.  Has been and continues to appear euvolemic and well compensated.  3. History of coronary artery disease status post stent placement 2008, 2015, no recent ischemic evaluation, no chest pain or shortness of breath  4. LEZAMA cirrhosis  5. Dyslipidemia  6. Valvular Heart Disease/MR     Plan:   1. Continue Metoprolol Succinate 100 mg daily for rate control.  Continue Losartan 50mg daily.  Continue Jardiance 10 mg daily and statin.  Avoid Entresto for now.  2. Due to orthostasis, we will allow for some degree of permissive hypertension.  Check orthostatics  3.   Lasix and spironolactone were initiated by GI at recent admission for LEZAMA cirrhosis and hepatic hydrothorax. On hold for now with orthostasis.  Patient appears euvolemic.  4.  Increase activities as tolerated.  Continue PT/OT.          I discussed the patient's findings and my recommendations with the patient, any present family members, and the nursing staff.  Yovani Valenzuela MD saw and examined patient, verified hx and PE, read all radiographic studies, reviewed labs and micro data, and formulated dx, plan for treatment and all medical decision making.      Milena Fleming PA-C  09/19/22, 09:11 EDT

## 2022-09-19 NOTE — PLAN OF CARE
Goal Outcome Evaluation:           Progress: improving  Outcome Evaluation: Pt improved ambulation distance to 450ft with SBA, unsupported. Pt demo good endurance with activity and no LOB throughout session. Pt demo good safety awareness throughout session and c/o no dizziness/light headedness with transitions. Pt has met all goals and does not demo need for continued skilled IP PT interventions at this time, will sign off. Recommend D/C back to [facility] when medically appropriate.

## 2022-09-19 NOTE — PROGRESS NOTES
[facility] Medicine [facility]  PROGRESS NOTE    Patient Name: Gwyn Fuchs  : 1950  MRN: [id number]    Date of Admission: 2022  Primary Care Physician: Riley Mckoy DO    Subjective   Subjective     CC:  Hospital follow up orthostasis    HPI:  Up in chair during my visit. Was significantly orthostatic this morning, dropping to 78/51 with standing. Denies shortness of air, cough, swelling.         ROS:  Gen- No fevers, chills  CV- No chest pain, palpitations  Resp- No cough, dyspnea  GI- No N/V/D, abd pain  Neuro- + dizziness with standing        Objective   Objective     Vital Signs:   Temp:  [97.6 °F (36.4 °C)-98.1 °F (36.7 °C)] 97.7 °F (36.5 °C)  Heart Rate:  [] 103  Resp:  [11-18] 11  BP: ()/(51-91) 145/82     Physical Exam:  Constitutional: No acute distress, awake, alert, upright in chair, nursing staff at bedside  HENT: NCAT, mucous membranes moist  Respiratory: Clear to auscultation bilaterally, respiratory effort normal on room air   Cardiovascular: RRR, no murmurs, rubs, or gallops  Gastrointestinal: Positive bowel sounds, soft, nontender, nondistended  Musculoskeletal: No bilateral ankle edema  Psychiatric: Appropriate affect, cooperative  Neurologic: Oriented x 3, strength symmetric in all extremities, Cranial Nerves grossly intact to confrontation, speech clear  Skin: No rashes noted to exposed skin           Results Reviewed:  LAB RESULTS:      Lab 22  0645 22  0522 22  1908 09/15/22  0423 22  0551   WBC 5.93 6.02 8.18 5.54 5.86   HEMOGLOBIN 10.4* 10.4* 11.1* 12.1* 11.6*   HEMATOCRIT 32.8* 33.1* 34.4* 38.6 36.0*   PLATELETS 105* 120* 141 136* 118*   NEUTROS ABS 3.82  --  5.29  --   --    IMMATURE GRANS (ABS) 0.04  --  0.06*  --   --    LYMPHS ABS 0.82  --  1.31  --   --    MONOS ABS 1.01*  --  1.30*  --   --    EOS ABS 0.21  --  0.18  --   --    MCV 85.9 86.4 85.8 88.9 85.7         Lab 22  0340 22  0645  09/17/22 0522 09/16/22  1908 09/15/22  0423 09/14/22  0542   SODIUM  --  136 135* 136 138 137   POTASSIUM  --  4.5 4.2 4.0 3.3* 4.1   CHLORIDE  --  101 100 99 98 101   CO2  --  22.0 22.0 23.0 25.0 24.0   ANION GAP  --  13.0 13.0 14.0 15.0 12.0   BUN  --  23 25* 25* 17 18   CREATININE  --  1.29* 1.42* 1.67* 1.31* 1.18   EGFR  --  58.9* 52.5* 43.2* 57.8* 65.6   GLUCOSE  --  310* 184* 85 151* 215*   CALCIUM  --  8.4* 8.5* 9.0 9.2 9.2   MAGNESIUM 2.3  --  1.9 1.9  --   --          Lab 09/16/22 1908 09/14/22  0542   TOTAL PROTEIN 7.8 8.0   ALBUMIN 3.20* 3.20*   GLOBULIN 4.6 4.8   ALT (SGPT) 61* 68*   AST (SGOT) 70* 72*   BILIRUBIN 0.2 0.3   ALK PHOS 158* 172*         Lab 09/16/22 1908   PROBNP 109.7   TROPONIN T <0.010                 Brief Urine Lab Results     None          Microbiology Results Abnormal     None          No radiology results from the last 24 hrs    Results for orders placed during the hospital encounter of 09/05/22    Adult Transesophageal Echo (DALE) W/ Cont if Necessary Per Protocol    Interpretation Summary  · The right ventricular cavity is small in size.  · Global hyokinesis with EF 35%  · Moderate MR  · Mild AI      I have reviewed the medications:  Scheduled Meds:atorvastatin, 20 mg, Oral, Nightly  empagliflozin, 10 mg, Oral, Daily  insulin lispro, 0-7 Units, Subcutaneous, TID AC  lactulose, 20 g, Oral, TID  levothyroxine, 75 mcg, Oral, Q AM  losartan, 50 mg, Oral, Q24H  metoprolol succinate XL, 100 mg, Oral, Q24H  pantoprazole, 40 mg, Oral, Daily  rifAXIMin, 200 mg, Oral, BID  sodium chloride, 10 mL, Intravenous, Q12H  sodium chloride, 75 mL/hr, Intravenous, Once  venlafaxine XR, 150 mg, Oral, Daily With Breakfast      Continuous Infusions:   PRN Meds:.•  acetaminophen **OR** acetaminophen **OR** acetaminophen  •  dextrose  •  dextrose  •  glucagon (human recombinant)  •  magnesium sulfate **OR** magnesium sulfate **OR** magnesium sulfate  •  ondansetron **OR** ondansetron  •  potassium  chloride **OR** potassium chloride **OR** potassium chloride  •  sodium chloride  •  sodium chloride    Assessment & Plan   Assessment & Plan     Active Hospital Problems    Diagnosis  POA   • Orthostatic hypotension [I95.1]  Yes   • Chronic systolic heart failure (CMS/HCC) [I50.22]  Yes   • Other cirrhosis of liver (HCC) [K74.69]  Yes   • Coronary artery disease involving native coronary artery of native heart without angina pectoris [I25.10]  Yes   • Recurrent right pleural effusion [J90]  Yes   • HTN (hypertension) [I10]  Yes   • Type 2 diabetes mellitus without complication, with long-term current use of insulin (HCC) [E11.9, Z79.4]  Not Applicable   • Impaired renal function [N28.9]  Yes      Resolved Hospital Problems   No resolved problems to display.        Gwyn Fuchs is a 72 y.o. male with PMH significant for HTN, HLD, CAD (s/p stents), chronic systolic CHF, insulin-dependent DMII, CKD II and LEZAMA cirrhosis with chronic anemia/thrombocytopenia. Patient was admitted to [facility] 9/5-9/15 secondary to recurrent R pleural effusion, Decompensated Cirrhosis, and CHF who presents with orthostatic hypotension.  During his previous admission multiple medications were adjusted.     Symptomatic Orthostatic Hypotension  -Patient started on Lasix 40mg, Spironolactone 100mg, and Entresto 49-51mg BID on last admit.  Suspect his orthostatic hypotension may be related to his multiple medications  -s/p IVF  -Metoprolol succinate 100 mg daily is ordered for rate control by Dr. Valenzuela  -Cardiology consulted. Nicolas following.  Hold entresto, lasix and spironolactone. Would benefit from permissive hypertension; however,placed on losartan due to elevation inresting bp. Hold lasix and spironolactone.   -PT/OT evaluation. Also discussed use of compression stockings  - orthostatics today still positive.  BP 78/51 with standing.  Discussed with Kianna Fleming PA-C this morning.  Patient appears dehydrated. Will give a liter of normal  saline over the course of the next 12 hours and repeat orthostatic blood pressures again in am.       LEZAMA cirrhosis   - GI saw last admit. Recommend high-protein, low sodium (< 2g Na/day) diet, frequent high-protein snacks and high-protein bedtime snack.  - Continue Xifaxan, Lactulose and PPI  - Holding Lasix and Spironolactone as remains clinically dehydrated  - GI felt patient is a poor candidate for TIPS due to pulmonary hypertension and hepatic encephalopathy   - Plan is to follow up with [facility] in 3 weeks.      Chronic systolic CHF, LVEF 30-35%  Severe mitral valve regurgitation  CAD s/p remote stenting / history of MI  Essential hypertension  Hyperlipidemia  - Hold Entresto due to Orthostasis   - Metoprolol succinate 100 mg daily per cardiology.  - Cardiology is following.   - Echo as above      Recent admit for recurrent R pleural effusion   -GI felt likely secondary to hepatic hydrothorax   -S/P outpatient large-volume thoracentesis by [facility] pulmonology on 8/18 and 8/24 with ~2L removed each time  - IR thoracentesis on 9/6/22 with 2000mL of transudative fluid removed. Cytology benign  - ECHO 9/6/22: LVEF 30-35% with with severe MR, LV dysfunction. DALE performed on 9/14- global HK with EF 35%, Mod MR, mild AI.   -Followed by Dr Osorio last admit    -GI evaluated and did not recommend chest tube for the treatment of hepatic hydrothorax, as this can lead to massive protein-electrolyte depletion, infection, renal failure and bleeding.  -No re-accumulation noted thus far.      Insulin-dependent DMII  - Hgb A1c 9.3%  - Continue Jardiance, started last admit  - LSDDI. Will add levemir today for sub optimal blood glucoses      Probable CKD II  - Cr 1.67 elevated on presentation, now trending down  - Gentle IVF  - Holding Entresto, Lasix, and Spironolactone   - BMP reviewed     Hypothyroidism  -Continue Levothyroxine      Hypomagnesemia  -Replace per protocol         DVT prophylaxis:  Mechanical        Expected  Discharge Location and Transportation: back to [facility]?  Expected Discharge Date: 9/20/22    DVT prophylaxis:  Mechanical DVT prophylaxis orders are present.     AM-PAC 6 Clicks Score (PT): 18 (09/19/22 0821)    CODE STATUS:   Code Status and Medical Interventions:   Ordered at: 09/17/22 0117     Level Of Support Discussed With:    Patient     Code Status (Patient has no pulse and is not breathing):    CPR (Attempt to Resuscitate)     Medical Interventions (Patient has pulse or is breathing):    Full Support           STEPHANIE Browne  09/19/22

## 2022-09-19 NOTE — THERAPY DISCHARGE NOTE
Patient Name: Gwyn Fuchs  : 1950    MRN: [id number]                              Today's Date: 2022       Admit Date: 2022    Visit Dx:     ICD-10-CM ICD-9-CM   1. Orthostatic hypotension  I95.1 458.0   2. Syncope, unspecified syncope type  R55 780.2     Patient Active Problem List   Diagnosis   • Other cirrhosis of liver (HCC)   • Coronary artery disease involving native coronary artery of native heart without angina pectoris   • Recurrent right pleural effusion   • HTN (hypertension)   • Type 2 diabetes mellitus without complication, with long-term current use of insulin (HCC)   • Impaired renal function   • Chronic systolic heart failure (CMS/HCC)   • Orthostatic hypotension     Past Medical History:   Diagnosis Date   • Coronary artery disease    • Diabetes mellitus (HCC)    • Hyperlipidemia    • Hypertension    • Myocardial infarction (HCC)      Past Surgical History:   Procedure Laterality Date   • KNEE SURGERY Right       General Information     Row Name 22 1440          Physical Therapy Time and Intention    Document Type discharge treatment  -AE     Mode of Treatment physical therapy  -AE     Row Name 22 1440          General Information    Existing Precautions/Restrictions fall  -AE     Barriers to Rehab medically complex;previous functional deficit  -AE     Row Name 22 1440          Cognition    Orientation Status (Cognition) oriented x 4  -AE     Row Name 22 1440          Safety Issues, Functional Mobility    Safety Issues Affecting Function (Mobility) safety precaution awareness  -AE           User Key  (r) = Recorded By, (t) = Taken By, (c) = Cosigned By    Initials Name Provider Type    AE Bony Metcalf PT Physical Therapist               Mobility     Row Name 22 1444          Bed Mobility    Comment, (Bed Mobility) Pt received and left UIC.  -AE     Row Name 22 1444          Transfers    Comment, (Transfers) No VCs required for any mobility.  Pt demo independence with STS transfers. No LOB noted.  -AE     Row Name 09/19/22 1444          Sit-Stand Transfer    Sit-Stand [place] (Transfers) independent  -AE     Row Name 09/19/22 1444          Gait/Stairs (Locomotion)    [place] Level (Gait) standby assist  for safety  -AE     Distance in Feet (Gait) 450  -AE     Deviations/Abnormal Patterns (Gait) base of support, narrow  -AE     Bilateral Gait Deviations heel strike decreased  -AE     Comment, (Gait/Stairs) Pt demo step through gait pattern with slowed hannah but an overall improvement in independence with mobility since last session. Pt demo no LOB throughout gait and demo good endurance with activity. No c/o dizziness with transitions.  -AE           User Key  (r) = Recorded By, (t) = Taken By, (c) = Cosigned By    Initials Name Provider Type    AE Bony Metcalf, PT Physical Therapist               Obj/Interventions     Row Name 09/19/22 1448          Balance    Balance Assessment sitting static balance;sitting dynamic balance;sit to stand dynamic balance;standing static balance;standing dynamic balance  -AE     Static Sitting Balance independent  -AE     Dynamic Sitting Balance independent  -AE     Position, Sitting Balance unsupported;sitting in chair  -AE     Sit to Stand Dynamic Balance independent  -AE     Static Standing Balance standby assist  -AE     Dynamic Standing Balance standby assist  -AE     Position/Device Used, Standing Balance unsupported  -AE           User Key  (r) = Recorded By, (t) = Taken By, (c) = Cosigned By    Initials Name Provider Type    AE Bony Metcalf, PT Physical Therapist               Goals/Plan     Row Name 09/19/22 1453          Bed Mobility Goal 1 (PT)    Progress/Outcomes (Bed Mobility Goal 1, PT) goal met  -AE     Row Name 09/19/22 1453          Transfer Goal 1 (PT)    Progress/Outcome (Transfer Goal 1, PT) goal met  -AE           User Key  (r) = Recorded By, (t) = Taken By, (c) = Cosigned By     Initials Name Provider Type    AE Bony Metcalf PT Physical Therapist               Clinical Impression     Row Name 09/19/22 1449          Pain    Pretreatment Pain Rating 0/10 - no pain  -AE     Posttreatment Pain Rating 0/10 - no pain  -AE     Row Name 09/19/22 1449          Plan of Care Review    Progress improving  -AE     Outcome Evaluation Pt improved ambulation distance to 450ft with SBA, unsupported. Pt demo good endurance with activity and no LOB throughout session. Pt demo good safety awareness throughout session and c/o no dizziness/light headedness with transitions. Pt has met all goals and does not demo need for continued skilled IP PT interventions at this time, will sign off. Recommend D/C back to [facility] when medically appropriate.  -AE     Row Name 09/19/22 1449          Vital Signs    Pre Systolic BP Rehab 138  -AE     Pre Treatment Diastolic BP 72  -AE     Pretreatment Heart Rate (beats/min) 87  -AE     Posttreatment Heart Rate (beats/min) 92  -AE     Pre SpO2 (%) 98  -AE     O2 Delivery Pre Treatment room air  -AE     O2 Delivery Intra Treatment room air  -AE     Post SpO2 (%) 99  -AE     O2 Delivery Post Treatment room air  -AE     Pre Patient Position Sitting  -AE     Intra Patient Position Standing  -AE     Post Patient Position Sitting  -AE     Row Name 09/19/22 1449          Positioning and Restraints    Pre-Treatment Position sitting in chair/recliner  -AE     Post Treatment Position chair  -AE     In Chair notified nsg;reclined;call light within reach;encouraged to call for assist;exit alarm on;waffle cushion;legs elevated  -AE           User Key  (r) = Recorded By, (t) = Taken By, (c) = Cosigned By    Initials Name Provider Type    AE Bony Metcalf PT Physical Therapist               Outcome Measures     Row Name 09/19/22 1454 09/19/22 0821       How much help from another person do you currently need...    Turning from your back to your side while in flat bed without using bedrails?  4  -AE 3  -KB    Moving from lying on back to sitting on the side of a flat bed without bedrails? 4  -AE 3  -KB    Moving to and from a bed to a chair (including a wheelchair)? 4  -AE 3  -KB    Standing up from a chair using your arms (e.g., wheelchair, bedside chair)? 4  -AE 3  -KB    Climbing 3-5 steps with a railing? 3  -AE 3  -KB    To walk in hospital room? 4  -AE 3  -KB    AM-PAC 6 Clicks Score (PT) 23  -AE 18  -KB    Highest level of mobility 7 --> Walked 25 feet or more  -AE 6 --> Walked 10 steps or more  -KB    Row Name 09/19/22 1144          Functional Assessment    Outcome Measure Options AM-PAC 6 Clicks Basic Mobility (PT)  -AE           User Key  (r) = Recorded By, (t) = Taken By, (c) = Cosigned By    Initials Name Provider Type     China Lombardo RN Registered Nurse     Bony Metcalf, PT Physical Therapist              Physical Therapy Education                 Title: PT OT SLP Therapies (In Progress)     Topic: Physical Therapy (In Progress)     Point: Mobility training (Done)     Learning Progress Summary           Patient Acceptance, E, VU by  at 9/19/2022 1350    Acceptance, E, NR by  at 9/17/2022 1104    Comment: Safe mobility                   Point: Home exercise program (Not Started)     Learner Progress:  Not documented in this visit.          Point: Body mechanics (Done)     Learning Progress Summary           Patient Acceptance, E, VU by  at 9/19/2022 1350                   Point: Precautions (Done)     Learning Progress Summary           Patient Acceptance, E, VU by  at 9/19/2022 1350    Acceptance, E, NR by  at 9/17/2022 1104    Comment: Safe mobility                               User Key     Initials Effective Dates Name Provider Type Discipline     06/16/21 -  Sindy Shipman, PT Physical Therapist PT     09/21/21 -  Bony Metcalf PT Physical Therapist PT              PT Recommendation and Plan     Progress: improving  Outcome Evaluation: Pt improved  ambulation distance to 450ft with SBA, unsupported. Pt demo good endurance with activity and no LOB throughout session. Pt demo good safety awareness throughout session and c/o no dizziness/light headedness with transitions. Pt has met all goals and does not demo need for continued skilled IP PT interventions at this time, will sign off. Recommend D/C back to [facility] when medically appropriate.     Time Calculation:    PT Charges     Row Name 09/19/22 1454             Time Calculation    Start Time 1350  -AE      PT Received On 09/19/22  -AE      PT Goal Re-Cert Due Date 09/27/22  -AE              Time Calculation- PT    Total Timed Code Minutes- PT 24 minute(s)  -AE              Timed Charges    55642 - PT Therapeutic Activity Minutes 24  -AE              Total Minutes    Timed Charges Total Minutes 24  -AE       Total Minutes 24  -AE            User Key  (r) = Recorded By, (t) = Taken By, (c) = Cosigned By    Initials Name Provider Type    AE Bony Metcalf, TALIA Physical Therapist              Therapy Charges for Today     Code Description Service Date Service Provider Modifiers Qty    [id number]  PT THERAPEUTIC ACT EA 15 MIN 9/19/2022 Bony Metcalf, PT GP 2          PT G-Codes  Outcome Measure Options: AM-PAC 6 Clicks Basic Mobility (PT)  AM-PAC 6 Clicks Score (PT): 23  AM-PAC 6 Clicks Score (OT): 24    PT Discharge Summary  Anticipated Discharge Disposition (PT): other (see comments) ([facility])    Bony Metcalf PT  9/19/2022

## 2022-09-20 LAB
GLUCOSE BLDC GLUCOMTR-MCNC: 168 MG/DL (ref 70–130)
GLUCOSE BLDC GLUCOMTR-MCNC: 238 MG/DL (ref 70–130)
GLUCOSE BLDC GLUCOMTR-MCNC: 286 MG/DL (ref 70–130)
GLUCOSE BLDC GLUCOMTR-MCNC: 327 MG/DL (ref 70–130)

## 2022-09-20 PROCEDURE — 63710000001 INSULIN DETEMIR PER 5 UNITS: Performed by: NURSE PRACTITIONER

## 2022-09-20 PROCEDURE — 99225 PR SBSQ OBSERVATION CARE/DAY 25 MINUTES: CPT | Performed by: HOSPITALIST

## 2022-09-20 PROCEDURE — G0378 HOSPITAL OBSERVATION PER HR: HCPCS

## 2022-09-20 PROCEDURE — 82962 GLUCOSE BLOOD TEST: CPT

## 2022-09-20 PROCEDURE — 63710000001 INSULIN LISPRO (HUMAN) PER 5 UNITS: Performed by: FAMILY MEDICINE

## 2022-09-20 RX ADMIN — LEVOTHYROXINE SODIUM 75 MCG: 0.07 TABLET ORAL at 06:04

## 2022-09-20 RX ADMIN — RIFAXIMIN 200 MG: 200 TABLET ORAL at 08:01

## 2022-09-20 RX ADMIN — Medication 10 ML: at 20:15

## 2022-09-20 RX ADMIN — LACTULOSE 20 G: 20 SOLUTION ORAL at 20:15

## 2022-09-20 RX ADMIN — INSULIN LISPRO 2 UNITS: 100 INJECTION, SOLUTION INTRAVENOUS; SUBCUTANEOUS at 07:59

## 2022-09-20 RX ADMIN — METOPROLOL SUCCINATE 100 MG: 100 TABLET, EXTENDED RELEASE ORAL at 08:00

## 2022-09-20 RX ADMIN — INSULIN DETEMIR 10 UNITS: 100 INJECTION, SOLUTION SUBCUTANEOUS at 20:16

## 2022-09-20 RX ADMIN — RIFAXIMIN 200 MG: 200 TABLET ORAL at 20:15

## 2022-09-20 RX ADMIN — LOSARTAN POTASSIUM 50 MG: 50 TABLET, FILM COATED ORAL at 08:00

## 2022-09-20 RX ADMIN — INSULIN LISPRO 4 UNITS: 100 INJECTION, SOLUTION INTRAVENOUS; SUBCUTANEOUS at 11:32

## 2022-09-20 RX ADMIN — EMPAGLIFLOZIN 10 MG: 10 TABLET, FILM COATED ORAL at 08:00

## 2022-09-20 RX ADMIN — LACTULOSE 20 G: 20 SOLUTION ORAL at 15:19

## 2022-09-20 RX ADMIN — Medication 10 ML: at 08:01

## 2022-09-20 RX ADMIN — INSULIN LISPRO 5 UNITS: 100 INJECTION, SOLUTION INTRAVENOUS; SUBCUTANEOUS at 16:52

## 2022-09-20 RX ADMIN — PANTOPRAZOLE SODIUM 40 MG: 40 TABLET, DELAYED RELEASE ORAL at 11:32

## 2022-09-20 RX ADMIN — VENLAFAXINE HYDROCHLORIDE 150 MG: 75 CAPSULE, EXTENDED RELEASE ORAL at 08:00

## 2022-09-20 RX ADMIN — ATORVASTATIN CALCIUM 20 MG: 20 TABLET, FILM COATED ORAL at 20:15

## 2022-09-20 NOTE — PROGRESS NOTES
" [facility] - Progress Note    Gwyn READ Delph  1950  S364/1    09/20/22, 08:44 EDT      Chief Complaint: Following for orthostasis    Subjective:   Resting in bed, wide awake.  Feels good.  Still having a lot of BMs.  Wants to go home.    + Orthostatic Readings reviewed.    Review of Systems:  Pertinent positives are listed above and in physical exam.  All others have been reviewed and are negative.    atorvastatin, 20 mg, Oral, Nightly  empagliflozin, 10 mg, Oral, Daily  insulin detemir, 10 Units, Subcutaneous, Nightly  insulin lispro, 0-7 Units, Subcutaneous, TID AC  lactulose, 20 g, Oral, TID  levothyroxine, 75 mcg, Oral, Q AM  metoprolol succinate XL, 100 mg, Oral, Q24H  pantoprazole, 40 mg, Oral, Daily  rifAXIMin, 200 mg, Oral, BID  sodium chloride, 10 mL, Intravenous, Q12H  venlafaxine XR, 150 mg, Oral, Daily With Breakfast        Objective:  Vitals:   height is 180.3 cm (71\") and weight is 74.1 kg (163 lb 4.8 oz). His oral temperature is 97.6 °F (36.4 °C). His blood pressure is 137/77 and his pulse is 99. His respiration is 15 and oxygen saturation is 95%.       Intake/Output Summary (Last 24 hours) at 9/20/2022 0844  Last data filed at 9/19/2022 1300  Gross per 24 hour   Intake 765 ml   Output --   Net 765 ml       Physical Exam:  General:  WN, NAD, A and O x3.  CV:  Normal S1,S2. No murmur, rub, or gallop.  Resp:  CTA Fransico, equal, nonlabored.  Abd:  Soft, + BS, no organomegaly. Nontender to palpation.  Extrem:  No edema BLE, 2+ pedal/PT pulses.            Results from last 7 days   Lab Units 09/18/22  0645   WBC 10*3/mm3 5.93   HEMOGLOBIN g/dL 10.4*   HEMATOCRIT % 32.8*   PLATELETS 10*3/mm3 105*     Results from last 7 days   Lab Units 09/18/22  0645 09/17/22  0522 09/16/22  1908   SODIUM mmol/L 136   < > 136   POTASSIUM mmol/L 4.5   < > 4.0   CHLORIDE mmol/L 101   < > 99   CO2 mmol/L 22.0   < > 23.0   BUN mg/dL 23   < > 25*   CREATININE mg/dL 1.29*   < > 1.67*   CALCIUM mg/dL 8.4*   < > " 9.0   BILIRUBIN mg/dL  --   --  0.2   ALK PHOS U/L  --   --  158*   ALT (SGPT) U/L  --   --  61*   AST (SGOT) U/L  --   --  70*   GLUCOSE mg/dL 310*   < > 85    < > = values in this interval not displayed.                 Results from last 7 days   Lab Units 09/16/22  1908   PROBNP pg/mL 109.7       Tele:  NSR/ST      Assessment:   1. Orthostatic hypotension/near syncope, likely related to medications/polypharmacy and decreased mobility.  Medications have been on hold without recurrent symptoms.  2. Chronic systolic heart failure, EF 30-35% by echo 9/2022, recently started on multiple medications.  Has been and continues to appear euvolemic and well compensated.  3. History of coronary artery disease status post stent placement 2008, 2015, no recent ischemic evaluation, no chest pain or shortness of breath  4. LEZAMA cirrhosis  5. Dyslipidemia  6. Valvular Heart Disease/MR     Plan:   1. Continue Metoprolol Succinate 100 mg daily for rate control.   Continue Jardiance 10 mg daily and statin.  DC ARB, Avoid Entresto for now.  2. Due to orthostasis, we will allow for some degree of permissive hypertension.  Orthostatics reviewed.  3.   Lasix and spironolactone were initiated by GI at recent admission for LEZAMA cirrhosis and hepatic hydrothorax. On hold for now with orthostasis.  Patient appears euvolemic.  4.  Increase activities as tolerated.  Continue PT/OT.  5. Okay for discharge any time.            I discussed the patient's findings and my recommendations with the patient, any present family members, and the nursing staff.  Yovani Valenzuela MD saw and examined patient, verified hx and PE, read all radiographic studies, reviewed labs and micro data, and formulated dx, plan for treatment and all medical decision making.      Milena Fleming PA-C  09/20/22, 08:46 EDT

## 2022-09-20 NOTE — PROGRESS NOTES
[facility] Medicine Services  PROGRESS NOTE    Patient Name: Gwyn Fuchs  : 1950  MRN: [id number]    Date of Admission: 2022  Primary Care Physician: Riley Mckoy DO    Subjective   Subjective     CC:  F/U orthostasis    HPI:  Patient seen this morning. No complaints, denies dizziness.     ROS:  Gen-no fevers, no chills  CV-no chest pain, no palpitations  Resp-no cough, no dyspnea  GI-no N/V/D, no abd pain    All other systems reviewed and negative except any additional pertinent positives and negatives as discussed in HPI.      Objective   Objective     Vital Signs:   Temp:  [97.6 °F (36.4 °C)-97.8 °F (36.6 °C)] 97.8 °F (36.6 °C)  Heart Rate:  [] 86  Resp:  [11-16] 16  BP: ()/(47-96) 144/76     Physical Exam:  Gen-no acute distress  HENT-NCAT, mucous membranes moist  CV-RRR, S1 S2 normal, no m/r/g  Resp-CTAB, no wheezes or rales  Abd-soft, NT, ND, +BS  Ext-no edema  Neuro-A&Ox3, no focal deficits  Skin-no rashes  Psych-appropriate mood    Results Reviewed:  LAB RESULTS:      Lab 22  0645 22  0522 22  1908 09/15/22  0423 22  0551   WBC 5.93 6.02 8.18 5.54 5.86   HEMOGLOBIN 10.4* 10.4* 11.1* 12.1* 11.6*   HEMATOCRIT 32.8* 33.1* 34.4* 38.6 36.0*   PLATELETS 105* 120* 141 136* 118*   NEUTROS ABS 3.82  --  5.29  --   --    IMMATURE GRANS (ABS) 0.04  --  0.06*  --   --    LYMPHS ABS 0.82  --  1.31  --   --    MONOS ABS 1.01*  --  1.30*  --   --    EOS ABS 0.21  --  0.18  --   --    MCV 85.9 86.4 85.8 88.9 85.7         Lab 22  0340 22  0645 22  0522 22  1908 09/15/22  0423 22  0542   SODIUM  --  136 135* 136 138 137   POTASSIUM  --  4.5 4.2 4.0 3.3* 4.1   CHLORIDE  --  101 100 99 98 101   CO2  --  22.0 22.0 23.0 25.0 24.0   ANION GAP  --  13.0 13.0 14.0 15.0 12.0   BUN  --  23 25* 25* 17 18   CREATININE  --  1.29* 1.42* 1.67* 1.31* 1.18   EGFR  --  58.9* 52.5* 43.2* 57.8* 65.6   GLUCOSE  --  310* 184* 85 151*  215*   CALCIUM  --  8.4* 8.5* 9.0 9.2 9.2   MAGNESIUM 2.3  --  1.9 1.9  --   --          Lab 09/16/22 1908 09/14/22  0542   TOTAL PROTEIN 7.8 8.0   ALBUMIN 3.20* 3.20*   GLOBULIN 4.6 4.8   ALT (SGPT) 61* 68*   AST (SGOT) 70* 72*   BILIRUBIN 0.2 0.3   ALK PHOS 158* 172*         Lab 09/16/22 1908   PROBNP 109.7   TROPONIN T <0.010                 Brief Urine Lab Results     None          Microbiology Results Abnormal     None          No radiology results from the last 24 hrs    Results for orders placed during the hospital encounter of 09/05/22    Adult Transesophageal Echo (DALE) W/ Cont if Necessary Per Protocol    Interpretation Summary  · The right ventricular cavity is small in size.  · Global hyokinesis with EF 35%  · Moderate MR  · Mild AI      I have reviewed the medications:  Scheduled Meds:atorvastatin, 20 mg, Oral, Nightly  empagliflozin, 10 mg, Oral, Daily  insulin detemir, 10 Units, Subcutaneous, Nightly  insulin lispro, 0-7 Units, Subcutaneous, TID AC  lactulose, 20 g, Oral, TID  levothyroxine, 75 mcg, Oral, Q AM  metoprolol succinate XL, 100 mg, Oral, Q24H  pantoprazole, 40 mg, Oral, Daily  rifAXIMin, 200 mg, Oral, BID  sodium chloride, 10 mL, Intravenous, Q12H  venlafaxine XR, 150 mg, Oral, Daily With Breakfast      Continuous Infusions:   PRN Meds:.•  acetaminophen **OR** acetaminophen **OR** acetaminophen  •  dextrose  •  dextrose  •  glucagon (human recombinant)  •  magnesium sulfate **OR** magnesium sulfate **OR** magnesium sulfate  •  ondansetron **OR** ondansetron  •  potassium chloride **OR** potassium chloride **OR** potassium chloride  •  sodium chloride  •  sodium chloride    Assessment & Plan   Assessment & Plan     Active Hospital Problems    Diagnosis  POA   • Orthostatic hypotension [I95.1]  Yes   • Chronic systolic heart failure (CMS/HCC) [I50.22]  Yes   • Other cirrhosis of liver (HCC) [K74.69]  Yes   • Coronary artery disease involving native coronary artery of native heart without  angina pectoris [I25.10]  Yes   • Recurrent right pleural effusion [J90]  Yes   • HTN (hypertension) [I10]  Yes   • Type 2 diabetes mellitus without complication, with long-term current use of insulin (HCC) [E11.9, Z79.4]  Not Applicable   • Impaired renal function [N28.9]  Yes      Resolved Hospital Problems   No resolved problems to display.        Brief Hospital Course to date:  Gwyn Fuchs is a 72 y.o. male with PMH significant for HTN, HLD, CAD (s/p stents), chronic systolic CHF, insulin-dependent DMII, CKD II and LEZAMA cirrhosis with chronic anemia/thrombocytopenia. Patient was admitted to [facility] 9/5-9/15 secondary to recurrent R pleural effusion, Decompensated Cirrhosis, and CHF who presents with orthostatic hypotension.  During his previous admission multiple medications were adjusted.     This patient's problems and plans were partially entered by my partner and updated as appropriate by me 09/20/22.    Symptomatic Orthostatic Hypotension  -Patient started on Lasix 40mg, Spironolactone 100mg, and Entresto 49-51mg BID on last admit.  Suspect his orthostatic hypotension may be related to his multiple medications  -s/p IVF  -Metoprolol succinate 100 mg daily is ordered for rate control by Dr. Valenzuela  -Cardiology following.  Holding Entresto, Lasix, and Spironolactone. Would benefit from permissive hypertension. Losartan now discontinued as well.   -PT/OT evaluation. Also discussed use of compression stockings.  - Orthostatics today still positive however improved from prior values and patient reported no symptoms this time.       LEZAMA cirrhosis   - GI saw last admit. Recommend high-protein, low sodium (< 2g Na/day) diet, frequent high-protein snacks and high-protein bedtime snack.  - Continue Xifaxan, Lactulose and PPI  - Holding Lasix and Spironolactone as remains clinically dehydrated  - GI felt patient is a poor candidate for TIPS due to pulmonary hypertension and hepatic encephalopathy   - Plan is to follow  up with [facility] in 3 weeks.      Chronic systolic CHF, LVEF 30-35%  Severe mitral valve regurgitation  CAD s/p remote stenting / history of MI  Essential hypertension  Hyperlipidemia  - Holding Entresto due to Orthostasis   - Metoprolol succinate 100 mg daily per Cardiology.  - Echo as below     Recent admit for recurrent R pleural effusion   -GI felt likely secondary to hepatic hydrothorax   -S/P outpatient large-volume thoracentesis by [facility] on 8/18 and 8/24 with ~2L removed each time  - IR thoracentesis on 9/6/22 with 2000mL of transudative fluid removed. Cytology benign  - ECHO 9/6/22: LVEF 30-35% with with severe MR, LV dysfunction. DALE performed on 9/14/22- global HK with EF 35%, Mod MR, mild AI.   -GI evaluated and did not recommend chest tube for the treatment of hepatic hydrothorax, as this can lead to massive protein-electrolyte depletion, infection, renal failure and bleeding.  -No re-accumulation noted thus far.      Insulin-dependent DMII  - HbA1c 9.3%  - Continue Jardiance, started last admit  - Continue Levemir 10 units nightly and low dose SSI, adjust as needed      Probable CKD II  - Cr 1.67 elevated on presentation, now trending down  - s/p gentle IVF  - Holding Entresto, Lasix, and Spironolactone   - BMP reviewed     Hypothyroidism  - Continue Levothyroxine      Hypomagnesemia  - Replace per protocol        Expected Discharge Location and Transportation: home  Expected Discharge Date: 09/21/22     DVT prophylaxis:  Mechanical DVT prophylaxis orders are present.     AM-PAC 6 Clicks Score (PT): 23 (09/20/22 0800)    CODE STATUS:   Code Status and Medical Interventions:   Ordered at: 09/17/22 0117     Level Of Support Discussed With:    Patient     Code Status (Patient has no pulse and is not breathing):    CPR (Attempt to Resuscitate)     Medical Interventions (Patient has pulse or is breathing):    Full Support       Trista Holguin MD  09/20/22

## 2022-09-20 NOTE — PLAN OF CARE
Patient is AXO X3 and forgetful. Orthostatics were improved today. BP is elevated when lying. Other VSS stable. RA. Insulin coverage given. BM noted today. Voiding adequately. Barrier applied. No c/o of pain. MD notified PRN. Will continue to monitor throughout shift.

## 2022-09-20 NOTE — PLAN OF CARE
Patient is AXO x4. Forgetful at times. RA. VSS. NSR on tele. Received insulin coverage. IV infusing. BM noted. Barrier applied to bottom. MD notified prn. Will continue to monitor throughout shift.

## 2022-09-20 NOTE — PLAN OF CARE
Goal Outcome Evaluation:  Plan of Care Reviewed With: patient        Progress: improving   Pt is alert and oriented x4; forgetful. Vss on room air. Normal sinus on tele. Pt has slept off and on throughout the shift. No c/o of pain. Up with stand-by assist to bathroom; voiding spontaneously. 2 Bms this shift. Zguard applied to coccyx/bottom area. Orthostatic Bps done as ordered; Pt went from 138/72 lying to 80/47 standing. Will continue to monitor.

## 2022-09-21 LAB
GLUCOSE BLDC GLUCOMTR-MCNC: 165 MG/DL (ref 70–130)
GLUCOSE BLDC GLUCOMTR-MCNC: 173 MG/DL (ref 70–130)
GLUCOSE BLDC GLUCOMTR-MCNC: 242 MG/DL (ref 70–130)
GLUCOSE BLDC GLUCOMTR-MCNC: 289 MG/DL (ref 70–130)

## 2022-09-21 PROCEDURE — 63710000001 INSULIN DETEMIR PER 5 UNITS: Performed by: HOSPITALIST

## 2022-09-21 PROCEDURE — 82962 GLUCOSE BLOOD TEST: CPT

## 2022-09-21 PROCEDURE — G0378 HOSPITAL OBSERVATION PER HR: HCPCS

## 2022-09-21 PROCEDURE — 63710000001 INSULIN DETEMIR PER 5 UNITS: Performed by: NURSE PRACTITIONER

## 2022-09-21 PROCEDURE — 99225 PR SBSQ OBSERVATION CARE/DAY 25 MINUTES: CPT | Performed by: HOSPITALIST

## 2022-09-21 PROCEDURE — 63710000001 INSULIN LISPRO (HUMAN) PER 5 UNITS: Performed by: FAMILY MEDICINE

## 2022-09-21 PROCEDURE — 63710000001 INSULIN LISPRO (HUMAN) PER 5 UNITS: Performed by: HOSPITALIST

## 2022-09-21 RX ORDER — INSULIN LISPRO 100 [IU]/ML
0-9 INJECTION, SOLUTION INTRAVENOUS; SUBCUTANEOUS
Status: DISCONTINUED | OUTPATIENT
Start: 2022-09-21 | End: 2022-09-22 | Stop reason: HOSPADM

## 2022-09-21 RX ADMIN — Medication 10 ML: at 08:33

## 2022-09-21 RX ADMIN — METOPROLOL SUCCINATE 100 MG: 100 TABLET, EXTENDED RELEASE ORAL at 08:31

## 2022-09-21 RX ADMIN — LACTULOSE 20 G: 20 SOLUTION ORAL at 16:37

## 2022-09-21 RX ADMIN — LEVOTHYROXINE SODIUM 75 MCG: 0.07 TABLET ORAL at 05:52

## 2022-09-21 RX ADMIN — ATORVASTATIN CALCIUM 20 MG: 20 TABLET, FILM COATED ORAL at 20:17

## 2022-09-21 RX ADMIN — INSULIN LISPRO 2 UNITS: 100 INJECTION, SOLUTION INTRAVENOUS; SUBCUTANEOUS at 08:30

## 2022-09-21 RX ADMIN — INSULIN LISPRO 6 UNITS: 100 INJECTION, SOLUTION INTRAVENOUS; SUBCUTANEOUS at 16:37

## 2022-09-21 RX ADMIN — INSULIN LISPRO 3 UNITS: 100 INJECTION, SOLUTION INTRAVENOUS; SUBCUTANEOUS at 11:53

## 2022-09-21 RX ADMIN — PANTOPRAZOLE SODIUM 40 MG: 40 TABLET, DELAYED RELEASE ORAL at 11:53

## 2022-09-21 RX ADMIN — LACTULOSE 20 G: 20 SOLUTION ORAL at 08:30

## 2022-09-21 RX ADMIN — INSULIN DETEMIR 10 UNITS: 100 INJECTION, SOLUTION SUBCUTANEOUS at 08:37

## 2022-09-21 RX ADMIN — RIFAXIMIN 200 MG: 200 TABLET ORAL at 20:17

## 2022-09-21 RX ADMIN — EMPAGLIFLOZIN 10 MG: 10 TABLET, FILM COATED ORAL at 08:32

## 2022-09-21 RX ADMIN — RIFAXIMIN 200 MG: 200 TABLET ORAL at 08:32

## 2022-09-21 RX ADMIN — LACTULOSE 20 G: 20 SOLUTION ORAL at 20:18

## 2022-09-21 RX ADMIN — VENLAFAXINE HYDROCHLORIDE 150 MG: 75 CAPSULE, EXTENDED RELEASE ORAL at 08:31

## 2022-09-21 RX ADMIN — INSULIN DETEMIR 10 UNITS: 100 INJECTION, SOLUTION SUBCUTANEOUS at 21:12

## 2022-09-21 RX ADMIN — Medication 10 ML: at 20:17

## 2022-09-21 NOTE — PLAN OF CARE
Goal Outcome Evaluation:      Pt resting well today, vss, no c/o of pain or n/v, ambulating with assist in room and to bathroom, will cont to monitor.

## 2022-09-21 NOTE — PROGRESS NOTES
[facility] Medicine Services  PROGRESS NOTE    Patient Name: Gwyn Fuchs  : 1950  MRN: [id number]    Date of Admission: 2022  Primary Care Physician: Riley Mckoy DO    Subjective   Subjective     CC:  F/U orthostasis    HPI:  Patient seen this morning. No issues overnight.    ROS:  Gen-no fevers, no chills  CV-no chest pain, no palpitations  Resp-no cough, no dyspnea  GI-no N/V/D, no abd pain    All other systems reviewed and negative except any additional pertinent positives and negatives as discussed in HPI.      Objective   Objective     Vital Signs:   Temp:  [97.6 °F (36.4 °C)-98.1 °F (36.7 °C)] 98 °F (36.7 °C)  Heart Rate:  [] 101  Resp:  [16-18] 18  BP: ()/() 115/95     Physical Exam:  Gen-no acute distress  HENT-NCAT, mucous membranes moist  CV-RRR, S1 S2 normal, no m/r/g  Resp-CTAB, no wheezes or rales  Abd-soft, NT, ND, +BS  Ext-no edema  Neuro-A&Ox3, no focal deficits  Skin-no rashes  Psych-appropriate mood  No change from yesterday    Results Reviewed:  LAB RESULTS:      Lab 09/18/22  0645 09/17/22  0522 09/16/22  1908 09/15/22  0423   WBC 5.93 6.02 8.18 5.54   HEMOGLOBIN 10.4* 10.4* 11.1* 12.1*   HEMATOCRIT 32.8* 33.1* 34.4* 38.6   PLATELETS 105* 120* 141 136*   NEUTROS ABS 3.82  --  5.29  --    IMMATURE GRANS (ABS) 0.04  --  0.06*  --    LYMPHS ABS 0.82  --  1.31  --    MONOS ABS 1.01*  --  1.30*  --    EOS ABS 0.21  --  0.18  --    MCV 85.9 86.4 85.8 88.9         Lab 22  0340 2245 09/17/22  0522 09/16/22  1908 09/15/22  0423   SODIUM  --  136 135* 136 138   POTASSIUM  --  4.5 4.2 4.0 3.3*   CHLORIDE  --  101 100 99 98   CO2  --  22.0 22.0 23.0 25.0   ANION GAP  --  13.0 13.0 14.0 15.0   BUN  --  23 25* 25* 17   CREATININE  --  1.29* 1.42* 1.67* 1.31*   EGFR  --  58.9* 52.5* 43.2* 57.8*   GLUCOSE  --  310* 184* 85 151*   CALCIUM  --  8.4* 8.5* 9.0 9.2   MAGNESIUM 2.3  --  1.9 1.9  --          Lab 22  1906   TOTAL  PROTEIN 7.8   ALBUMIN 3.20*   GLOBULIN 4.6   ALT (SGPT) 61*   AST (SGOT) 70*   BILIRUBIN 0.2   ALK PHOS 158*         Lab 09/16/22  1908   PROBNP 109.7   TROPONIN T <0.010                 Brief Urine Lab Results     None          Microbiology Results Abnormal     None          No radiology results from the last 24 hrs    Results for orders placed during the hospital encounter of 09/05/22    Adult Transesophageal Echo (DALE) W/ Cont if Necessary Per Protocol    Interpretation Summary  · The right ventricular cavity is small in size.  · Global hyokinesis with EF 35%  · Moderate MR  · Mild AI      I have reviewed the medications:  Scheduled Meds:atorvastatin, 20 mg, Oral, Nightly  empagliflozin, 10 mg, Oral, Daily  insulin detemir, 10 Units, Subcutaneous, Nightly  insulin detemir, 10 Units, Subcutaneous, Daily  insulin lispro, 0-7 Units, Subcutaneous, TID AC  lactulose, 20 g, Oral, TID  levothyroxine, 75 mcg, Oral, Q AM  metoprolol succinate XL, 100 mg, Oral, Q24H  pantoprazole, 40 mg, Oral, Daily  rifAXIMin, 200 mg, Oral, BID  sodium chloride, 10 mL, Intravenous, Q12H  venlafaxine XR, 150 mg, Oral, Daily With Breakfast      Continuous Infusions:   PRN Meds:.•  acetaminophen **OR** acetaminophen **OR** acetaminophen  •  dextrose  •  dextrose  •  glucagon (human recombinant)  •  magnesium sulfate **OR** magnesium sulfate **OR** magnesium sulfate  •  ondansetron **OR** ondansetron  •  potassium chloride **OR** potassium chloride **OR** potassium chloride  •  sodium chloride  •  sodium chloride    Assessment & Plan   Assessment & Plan     Active Hospital Problems    Diagnosis  POA   • Orthostatic hypotension [I95.1]  Yes   • Chronic systolic heart failure (CMS/HCC) [I50.22]  Yes   • Other cirrhosis of liver (HCC) [K74.69]  Yes   • Coronary artery disease involving native coronary artery of native heart without angina pectoris [I25.10]  Yes   • Recurrent right pleural effusion [J90]  Yes   • HTN (hypertension) [I10]  Yes   •  Type 2 diabetes mellitus without complication, with long-term current use of insulin (HCC) [E11.9, Z79.4]  Not Applicable   • Impaired renal function [N28.9]  Yes      Resolved Hospital Problems   No resolved problems to display.        Brief Hospital Course to date:  Gwyn Fuchs is a 72 y.o. male with PMH significant for HTN, HLD, CAD (s/p stents), chronic systolic CHF, insulin-dependent DMII, CKD II and LEZAMA cirrhosis with chronic anemia/thrombocytopenia. Patient was admitted to [facility] 9/5-9/15 secondary to recurrent R pleural effusion, Decompensated Cirrhosis, and CHF who presents with orthostatic hypotension.  During his previous admission multiple medications were adjusted.     This patient's problems and plans were partially entered by my partner and updated as appropriate by me 09/21/22.    Symptomatic Orthostatic Hypotension  -Patient started on Lasix 40mg, Spironolactone 100mg, and Entresto 49-51mg BID on last admit.  Suspect his orthostatic hypotension may be related to his multiple medications  -s/p IVF  -Metoprolol succinate 100 mg daily is ordered for rate control by Dr. Valenzuela  -Cardiology following.  Holding Entresto, Lasix, and Spironolactone. Would benefit from permissive hypertension. Losartan now discontinued as well.   -PT/OT evaluation. Also discussed use of compression stockings.  - Orthostatics still positive however improved from prior values and patient reported no symptoms.     LEZAMA cirrhosis   - GI saw last admit. Recommend high-protein, low sodium (< 2g Na/day) diet, frequent high-protein snacks and high-protein bedtime snack.  - Continue Xifaxan, Lactulose and PPI  - Holding Lasix and Spironolactone as remains clinically dehydrated  - GI felt patient is a poor candidate for TIPS due to pulmonary hypertension and hepatic encephalopathy   - Plan is to follow up with [facility] in 3 weeks (10/12/22, Jacklyn BROWN).     Chronic systolic CHF, LVEF 30-35%  Severe mitral valve  regurgitation  CAD s/p remote stenting / history of MI  Essential hypertension  Hyperlipidemia  - Holding Entresto due to Orthostasis   - Metoprolol succinate 100 mg daily per Cardiology.  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 on 8/18 and 8/24 with ~2L removed each time  - IR thoracentesis on 9/6/22 with 2000mL of transudative fluid removed. Cytology benign  - ECHO 9/6/22: LVEF 30-35% with with severe MR, LV dysfunction. DALE performed on 9/14/22- global HK with EF 35%, Mod MR, mild AI.   -GI evaluated and did not recommend chest tube for the treatment of hepatic hydrothorax, as this can lead to massive protein-electrolyte depletion, infection, renal failure and bleeding.  -No re-accumulation noted thus far.      Insulin-dependent DMII  - HbA1c 9.3%  - Takes Humalog 75-25 at home, 30 units morning, 25 units evening  - Continue Jardiance, started last admit  - Increase Levemir to 10 units BID  - Increase to moderate dose SSI     Probable CKD II  - Cr 1.67 elevated on presentation, now trending down  - s/p gentle IVF  - Holding Entresto, Lasix, and Spironolactone   - BMP reviewed     Hypothyroidism  - Continue Levothyroxine      Hypomagnesemia  - Replace per protocol        Expected Discharge Location and Transportation: home  Expected Discharge Date: 09/22/22 medically ready anytime, however family has given up his room at independent living facility and plan is for him to go live with family in [place] but not till 9/28/22 (next Wednesday) so CM attempting to find somewhere for him/respite care in the mean time    DVT prophylaxis:  Mechanical DVT prophylaxis orders are present.     AM-PAC 6 Clicks Score (PT): 23 (09/21/22 0800)    CODE STATUS:   Code Status and Medical Interventions:   Ordered at: 09/17/22 0117     Level Of Support Discussed With:    Patient     Code Status  (Patient has no pulse and is not breathing):    CPR (Attempt to Resuscitate)     Medical Interventions (Patient has pulse or is breathing):    Full Support       Trista Holguin MD  09/21/22

## 2022-09-21 NOTE — PROGRESS NOTES
" [facility] - Progress Note    Gwyn READ Delph  1950  S364/1    09/21/22, 09:39 EDT      Chief Complaint: Following for orthostasis    Subjective:   Awake in bed, no family present.  Reports he feels fine, wants to go home.  Orthostatic readings noted, still present but margin has improved.      Review of Systems:  Pertinent positives are listed above and in physical exam.  All others have been reviewed and are negative.    atorvastatin, 20 mg, Oral, Nightly  empagliflozin, 10 mg, Oral, Daily  insulin detemir, 10 Units, Subcutaneous, Nightly  insulin detemir, 10 Units, Subcutaneous, Daily  insulin lispro, 0-7 Units, Subcutaneous, TID AC  lactulose, 20 g, Oral, TID  levothyroxine, 75 mcg, Oral, Q AM  metoprolol succinate XL, 100 mg, Oral, Q24H  pantoprazole, 40 mg, Oral, Daily  rifAXIMin, 200 mg, Oral, BID  sodium chloride, 10 mL, Intravenous, Q12H  venlafaxine XR, 150 mg, Oral, Daily With Breakfast        Objective:  Vitals:   height is 180.3 cm (71\") and weight is 74.1 kg (163 lb 4.8 oz). His oral temperature is 97.6 °F (36.4 °C). His blood pressure is 190/121 (abnormal) and his pulse is 98. His respiration is 16 and oxygen saturation is 94%.       Intake/Output Summary (Last 24 hours) at 9/21/2022 0939  Last data filed at 9/21/2022 0900  Gross per 24 hour   Intake 350 ml   Output --   Net 350 ml       Physical Exam:  General:  WN, NAD, A and O x3.  CV:  Normal S1,S2. No murmur, rub, or gallop.  Resp:  CTA Fransico, equal, nonlabored.  Abd:  Soft, + BS, no organomegaly. Nontender to palpation.  Extrem:  No edema BLE, 2+ pedal/PT pulses.            Results from last 7 days   Lab Units 09/18/22  0645   WBC 10*3/mm3 5.93   HEMOGLOBIN g/dL 10.4*   HEMATOCRIT % 32.8*   PLATELETS 10*3/mm3 105*     Results from last 7 days   Lab Units 09/18/22  0645 09/17/22  0522 09/16/22  1908   SODIUM mmol/L 136   < > 136   POTASSIUM mmol/L 4.5   < > 4.0   CHLORIDE mmol/L 101   < > 99   CO2 mmol/L 22.0   < > 23.0   BUN " mg/dL 23   < > 25*   CREATININE mg/dL 1.29*   < > 1.67*   CALCIUM mg/dL 8.4*   < > 9.0   BILIRUBIN mg/dL  --   --  0.2   ALK PHOS U/L  --   --  158*   ALT (SGPT) U/L  --   --  61*   AST (SGOT) U/L  --   --  70*   GLUCOSE mg/dL 310*   < > 85    < > = values in this interval not displayed.                 Results from last 7 days   Lab Units 09/16/22  1908   PROBNP pg/mL 109.7       Tele:  NSR/ST      Assessment:   1. Orthostatic hypotension/near syncope, likely related to medications/polypharmacy and decreased mobility.  Medications have been on hold without recurrent symptoms.  2. Chronic systolic heart failure, EF 30-35% by echo 9/2022, recently started on multiple medications.  Has been and continues to appear euvolemic and well compensated.  3. History of coronary artery disease status post stent placement 2008, 2015, no recent ischemic evaluation, no chest pain or shortness of breath  4. LEZAMA cirrhosis  5. Dyslipidemia  6. Valvular Heart Disease/MR     Plan:   1. Continue Metoprolol Succinate 100 mg daily for rate control.   Continue Jardiance 10 mg daily and statin.  DC'd ARB, Entresto and diuretics.  Do not resume at this time.  2. Due to orthostasis, we will allow for some degree of permissive hypertension.  Orthostatics reviewed.  3.   Lasix and spironolactone were initiated by GI at recent admission for LEZAMA cirrhosis and hepatic hydrothorax. DC'd with orthostasis. Patient appears euvolemic.  4.  Increase activities as tolerated.  Continue PT/OT.  5. Okay for discharge any time.            I discussed the patient's findings and my recommendations with the patient, any present family members, and the nursing staff.      Milena Fleming PA-C  09/21/22, 09:40 EDT

## 2022-09-21 NOTE — CASE MANAGEMENT/SOCIAL WORK
Continued Stay Note  [facility]     Patient Name: Gwyn Fuchs  MRN: [id number]  Today's Date: 9/21/2022    Admit Date: 9/16/2022    Plan: Ongoing   Discharge Plan     Row Name 09/21/22 1354       Plan    Plan Ongoing    Patient/Family in Agreement with Plan yes    Plan Comments I spoke with Mr. Fuchs's daughter, Lorena, on the phone today. She tells me that she has given her fathers independent living room up at [facility]. Lorena states that her father is going to live with his brother and sister in law in [place] on Wednesday 9/28/2022. Unfortunately Mr. Fuchs has no where to go at this time. His mobility is too high for acute or subacute rehab and none of his children have room for him in their homes. I spoke with Christi, admissions liaison with [facility], to have her work Mr. Fuchs up for a respite care room. I will await an answer from Christi. Case management will continue to follow.    Final Discharge Disposition Code 30 - still a patient               Discharge Codes    No documentation.               Expected Discharge Date and Time     Expected Discharge Date Expected Discharge Time    Sep 26, 2022             Roshan Wright RN

## 2022-09-21 NOTE — PLAN OF CARE
Goal Outcome Evaluation:  Plan of Care Reviewed With: patient        Progress: no change  Pt is alert and oriented X 4. Forgetfully at time. BP lying was 162/87 and standing was 87/51. RA.  Voiding spontaneously. Denied pain.

## 2022-09-22 VITALS
HEART RATE: 79 BPM | BODY MASS INDEX: 22.86 KG/M2 | RESPIRATION RATE: 18 BRPM | DIASTOLIC BLOOD PRESSURE: 78 MMHG | OXYGEN SATURATION: 96 % | SYSTOLIC BLOOD PRESSURE: 150 MMHG | TEMPERATURE: 98.2 F | HEIGHT: 71 IN | WEIGHT: 163.3 LBS

## 2022-09-22 LAB
GLUCOSE BLDC GLUCOMTR-MCNC: 225 MG/DL (ref 70–130)
GLUCOSE BLDC GLUCOMTR-MCNC: 273 MG/DL (ref 70–130)

## 2022-09-22 PROCEDURE — G0378 HOSPITAL OBSERVATION PER HR: HCPCS

## 2022-09-22 PROCEDURE — 63710000001 INSULIN LISPRO (HUMAN) PER 5 UNITS: Performed by: HOSPITALIST

## 2022-09-22 PROCEDURE — 82962 GLUCOSE BLOOD TEST: CPT

## 2022-09-22 PROCEDURE — 99217 PR OBSERVATION CARE DISCHARGE MANAGEMENT: CPT | Performed by: NURSE PRACTITIONER

## 2022-09-22 PROCEDURE — 63710000001 INSULIN DETEMIR PER 5 UNITS: Performed by: HOSPITALIST

## 2022-09-22 RX ADMIN — INSULIN DETEMIR 10 UNITS: 100 INJECTION, SOLUTION SUBCUTANEOUS at 08:20

## 2022-09-22 RX ADMIN — METOPROLOL SUCCINATE 100 MG: 100 TABLET, EXTENDED RELEASE ORAL at 08:20

## 2022-09-22 RX ADMIN — VENLAFAXINE HYDROCHLORIDE 150 MG: 75 CAPSULE, EXTENDED RELEASE ORAL at 08:20

## 2022-09-22 RX ADMIN — EMPAGLIFLOZIN 10 MG: 10 TABLET, FILM COATED ORAL at 08:31

## 2022-09-22 RX ADMIN — LEVOTHYROXINE SODIUM 75 MCG: 0.07 TABLET ORAL at 06:24

## 2022-09-22 RX ADMIN — PANTOPRAZOLE SODIUM 40 MG: 40 TABLET, DELAYED RELEASE ORAL at 11:25

## 2022-09-22 RX ADMIN — INSULIN LISPRO 4 UNITS: 100 INJECTION, SOLUTION INTRAVENOUS; SUBCUTANEOUS at 08:21

## 2022-09-22 RX ADMIN — RIFAXIMIN 200 MG: 200 TABLET ORAL at 08:31

## 2022-09-22 RX ADMIN — LACTULOSE 20 G: 20 SOLUTION ORAL at 08:21

## 2022-09-22 RX ADMIN — INSULIN LISPRO 6 UNITS: 100 INJECTION, SOLUTION INTRAVENOUS; SUBCUTANEOUS at 11:25

## 2022-09-22 NOTE — PLAN OF CARE
Problem: Adult Inpatient Plan of Care  Goal: Plan of Care Review  9/22/2022 1526 by Christi Wang RN  Outcome: Met  9/22/2022 1526 by Christi Wang RN  Outcome: Ongoing, Progressing  Goal: Patient-Specific Goal (Individualized)  9/22/2022 1526 by Christi Wang RN  Outcome: Met  9/22/2022 1526 by Christi Wang RN  Outcome: Ongoing, Progressing  Goal: Absence of Hospital-Acquired Illness or Injury  9/22/2022 1526 by Christi Wang RN  Outcome: Met  9/22/2022 1526 by Christi Wang RN  Outcome: Ongoing, Progressing  Intervention: Identify and Manage Fall Risk  Recent Flowsheet Documentation  Taken 9/22/2022 1400 by Christi Wang RN  Safety Promotion/Fall Prevention:   activity supervised   assistive device/personal items within reach   clutter free environment maintained   elopement precautions   fall prevention program maintained   gait belt   nonskid shoes/slippers when out of bed   room organization consistent   safety round/check completed  Taken 9/22/2022 1200 by Christi Wang RN  Safety Promotion/Fall Prevention:   activity supervised   assistive device/personal items within reach   clutter free environment maintained   elopement precautions   fall prevention program maintained   gait belt   nonskid shoes/slippers when out of bed   room organization consistent   safety round/check completed  Taken 9/22/2022 1000 by Christi Wang RN  Safety Promotion/Fall Prevention:   activity supervised   assistive device/personal items within reach   clutter free environment maintained   elopement precautions   fall prevention program maintained   gait belt   nonskid shoes/slippers when out of bed   room organization consistent   safety round/check completed  Taken 9/22/2022 0820 by Christi Wang RN  Safety Promotion/Fall Prevention:   activity supervised   assistive device/personal items within reach   elopement precautions   clutter free environment maintained   fall  prevention program maintained   gait belt   nonskid shoes/slippers when out of bed   room organization consistent   safety round/check completed  Intervention: Prevent Skin Injury  Recent Flowsheet Documentation  Taken 9/22/2022 1400 by Christi Wang RN  Body Position: position changed independently  Skin Protection:   adhesive use limited   incontinence pads utilized   transparent dressing maintained   tubing/devices free from skin contact  Taken 9/22/2022 1200 by Christi Wang RN  Body Position: supine  Skin Protection:   adhesive use limited   incontinence pads utilized   transparent dressing maintained   tubing/devices free from skin contact  Taken 9/22/2022 1000 by Christi Wang RN  Body Position: neutral body alignment  Skin Protection:   adhesive use limited   incontinence pads utilized   transparent dressing maintained   tubing/devices free from skin contact  Taken 9/22/2022 0820 by Christi Wang RN  Body Position: sitting up in bed  Skin Protection:   adhesive use limited   incontinence pads utilized   transparent dressing maintained   tubing/devices free from skin contact  Intervention: Prevent and Manage VTE (Venous Thromboembolism) Risk  Recent Flowsheet Documentation  Taken 9/22/2022 1400 by Christi Wang RN  VTE Prevention/Management: sequential compression devices off  Taken 9/22/2022 1200 by Christi Wang RN  VTE Prevention/Management: sequential compression devices off  Taken 9/22/2022 1000 by Christi Wang RN  VTE Prevention/Management: sequential compression devices off  Taken 9/22/2022 0820 by Christi Wang RN  VTE Prevention/Management: sequential compression devices off  Intervention: Prevent Infection  Recent Flowsheet Documentation  Taken 9/22/2022 1400 by Christi Wang RN  Infection Prevention: environmental surveillance performed  Taken 9/22/2022 1200 by Christi Wang RN  Infection Prevention: environmental surveillance performed  Taken  9/22/2022 1000 by Christi Wang RN  Infection Prevention: environmental surveillance performed  Taken 9/22/2022 0820 by Christi Wang RN  Infection Prevention: environmental surveillance performed  Goal: Optimal Comfort and Wellbeing  9/22/2022 1526 by Christi Wang RN  Outcome: Met  9/22/2022 1526 by Christi Wang RN  Outcome: Ongoing, Progressing  Intervention: Provide Person-Centered Care  Recent Flowsheet Documentation  Taken 9/22/2022 1400 by Christi Wang RN  Trust Relationship/Rapport: care explained  Taken 9/22/2022 1200 by Christi Wang RN  Trust Relationship/Rapport: care explained  Taken 9/22/2022 1000 by Christi Wang RN  Trust Relationship/Rapport: care explained  Taken 9/22/2022 0820 by Christi Wang RN  Trust Relationship/Rapport: care explained  Goal: Readiness for Transition of Care  9/22/2022 1526 by Christi Wang RN  Outcome: Met  9/22/2022 1526 by Christi Wang RN  Outcome: Ongoing, Progressing     Problem: Fall Injury Risk  Goal: Absence of Fall and Fall-Related Injury  9/22/2022 1526 by Christi Wang RN  Outcome: Met  9/22/2022 1526 by Christi Wang RN  Outcome: Ongoing, Progressing  Intervention: Identify and Manage Contributors  Recent Flowsheet Documentation  Taken 9/22/2022 0820 by Christi Wang RN  Medication Review/Management: medications reviewed  Intervention: Promote Injury-Free Environment  Recent Flowsheet Documentation  Taken 9/22/2022 1400 by Christi Wang RN  Safety Promotion/Fall Prevention:   activity supervised   assistive device/personal items within reach   clutter free environment maintained   elopement precautions   fall prevention program maintained   gait belt   nonskid shoes/slippers when out of bed   room organization consistent   safety round/check completed  Taken 9/22/2022 1200 by Christi Wang RN  Safety Promotion/Fall Prevention:   activity supervised   assistive device/personal items within  reach   clutter free environment maintained   elopement precautions   fall prevention program maintained   gait belt   nonskid shoes/slippers when out of bed   room organization consistent   safety round/check completed  Taken 9/22/2022 1000 by Christi Wang, RN  Safety Promotion/Fall Prevention:   activity supervised   assistive device/personal items within reach   clutter free environment maintained   elopement precautions   fall prevention program maintained   gait belt   nonskid shoes/slippers when out of bed   room organization consistent   safety round/check completed  Taken 9/22/2022 0820 by Christi Wang, RN  Safety Promotion/Fall Prevention:   activity supervised   assistive device/personal items within reach   elopement precautions   clutter free environment maintained   fall prevention program maintained   gait belt   nonskid shoes/slippers when out of bed   room organization consistent   safety round/check completed     Problem: Hypertension Comorbidity  Goal: Blood Pressure in Desired Range  9/22/2022 1526 by Christi Wang, RN  Outcome: Met  9/22/2022 1526 by Christi Wang RN  Outcome: Ongoing, Progressing  Intervention: Maintain Blood Pressure Management  Recent Flowsheet Documentation  Taken 9/22/2022 0820 by Christi Wang, RN  Medication Review/Management: medications reviewed   Goal Outcome Evaluation:

## 2022-09-22 NOTE — CASE MANAGEMENT/SOCIAL WORK
Case Management Discharge Note      Final Note: Mr. Fuchs will be discharged to [facility]. He will be transported by his daughter, Lorena. I spoke with Mr. Fuchs and his daughter's, Lorena and Florence, and they are all in agreement with this plan. Nurse to call report to [phone]. Pharmacy has been updated in EPIC.         Selected Continued Care - Admitted Since 9/16/2022     Destination Coordination complete.    Service Provider Selected Services Address Phone Fax Patient Preferred    [facility]  [address] [address] [phone] --       Internal Comment last updated by Roshan Wright RN 9/22/2022 1354    Respite care.                      Durable Medical Equipment    No services have been selected for the patient.              Dialysis/Infusion    No services have been selected for the patient.              Home Medical Care    No services have been selected for the patient.              Therapy    No services have been selected for the patient.              Community Resources    No services have been selected for the patient.              Community & DME    No services have been selected for the patient.                  Transportation Services  Private: Car    Final Discharge Disposition Code: 01 - home or self-care

## 2022-09-22 NOTE — PLAN OF CARE
Goal Outcome Evaluation:  Plan of Care Reviewed With: patient        Progress: no change   Pt is alert and oriented X 4. VSS. Slept on/off throughout the night. Denied pain.

## 2022-09-22 NOTE — DISCHARGE SUMMARY
[facility] Medicine Services  DISCHARGE SUMMARY    Patient Name: Gwyn Fuchs  : 1950  MRN: [id number]    Date of Admission: 2022  6:53 PM  Date of Discharge:  22    Primary Care Physician: Riley Mckoy, DO    Consults     Date and Time Order Name Status Description    2022  1:17 AM Inpatient Cardiology Consult Completed     2022 10:59 AM Inpatient Cardiology Consult Completed     2022  2:30 PM Inpatient Gastroenterology Consult Completed           Hospital Course     Presenting Problem:   Orthostatic hypotension [I95.1]    Active Hospital Problems    Diagnosis  POA   • Orthostatic hypotension [I95.1]  Yes   • Chronic systolic heart failure (CMS/HCC) [I50.22]  Yes   • Other cirrhosis of liver (HCC) [K74.69]  Yes   • Coronary artery disease involving native coronary artery of native heart without angina pectoris [I25.10]  Yes   • Recurrent right pleural effusion [J90]  Yes   • HTN (hypertension) [I10]  Yes   • Type 2 diabetes mellitus without complication, with long-term current use of insulin (HCC) [E11.9, Z79.4]  Not Applicable   • Impaired renal function [N28.9]  Yes      Resolved Hospital Problems   No resolved problems to display.          Hospital Course:  Gwyn Fuchs is a 72 y.o. male with PMH significant for HTN, HLD, CAD (s/p stents), chronic systolic CHF, insulin-dependent DMII, CKD II and LEZAMA cirrhosis with chronic anemia/thrombocytopenia. Patient was admitted to [facility] -9/15 secondary to recurrent R pleural effusion, Decompensated Cirrhosis, and CHF who presented back on 2022 with orthostatic hypotension.  During his previous admission multiple medications were adjusted. Cardiology was consulted and recommended allowing mild permissive hypertension due to orthostasis. His home Entresto, Lasix, and Aldactone have been held. Orthostatic BP has improved with changes.  has arranged for respite care at [facility] while final arrangements are  made to get him to [place] to live with his brother. He will be discharged today in stable condition.      Symptomatic Orthostatic Hypotension  -Patient started on Lasix 40mg, Spironolactone 100mg, and Entresto 49-51mg BID on last admit.  Suspect his orthostatic hypotension may be related to his multiple medications  -s/p IVF  -Continue Metoprolol succinate 100 mg for rate control by Dr. Valenzuela  -Cardiology followed.  Holding Entresto, Lasix, and Spironolactone. Would benefit from permissive hypertension  -PT/OT followed  - Orthostatics still positive however improved from prior values and patient reported no symptoms     LEZAMA cirrhosis   - GI saw last admit. Recommend high-protein, low sodium (< 2g Na/day) diet, frequent high-protein snacks and high-protein bedtime snack.  - Continue Xifaxan, Lactulose and PPI  - Holding Lasix and Spironolactone as remains clinically dehydrated  - GI felt patient is a poor candidate for TIPS due to pulmonary hypertension and hepatic encephalopathy   - Plan is to follow up with [facility] in 3 weeks (10/12/22, Jacklyn BROWN).     Chronic systolic CHF, LVEF 30-35%  Severe mitral valve regurgitation  CAD s/p remote stenting / history of MI  Essential hypertension  Hyperlipidemia  - Holding Entresto due to Orthostasis   - Metoprolol succinate 100 mg daily per Cardiology.  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 Cytology benign  - ECHO 9/6/22: LVEF 30-35% with with severe MR, LV dysfunction. DALE performed on 9/14/22- global HK with EF 35%, Mod MR, mild AI.   -GI evaluated and did not recommend chest tube for the treatment of hepatic hydrothorax, as this can lead to massive protein-electrolyte depletion, infection, renal  failure and bleeding.  -No re-accumulation noted thus far.      Insulin-dependent DMII  - HbA1c 9.3%  -Resume home regimen      Probable CKD II  - Cr 1.67 elevated on presentation, now trending down  - s/p gentle IVF  - Holding Entresto, Lasix, and Spironolactone   - BMP reviewed     Hypothyroidism  - Continue Levothyroxine      Hypomagnesemia  - Replace per protocol       Discharge Follow Up Recommendations for outpatient labs/diagnostics:  Follow up with PCP in 1 week.  Follow up with [facility] as scheduled 10/12/2022.  Follow up with Cardiologist Dr. Valenzuela in 4 weeks.     Day of Discharge     HPI:   Patient seen resting in bed in no apparent distress. No acute events overnight per nursing. He is feeling well today. We discussed plan to discharge to [facility] today and he is agreeable. We discussed the importance of taking all medications as prescribed and keeping all recommended follow up appointments. He will be discharged today in stable condition.     Review of Systems  Gen- No fevers, chills  CV- No chest pain, palpitations  Resp- No cough, dyspnea  GI- No N/V/D, abd pain    Vital Signs:   Temp:  [97.6 °F (36.4 °C)-98.2 °F (36.8 °C)] 98.2 °F (36.8 °C)  Heart Rate:  [78-90] 79  Resp:  [16-18] 18  BP: (143-179)/(75-97) 150/78    Physical Exam:  Constitutional: No acute distress, awake, alert, chronically ill-appearing   HENT: NCAT, mucous membranes moist  Respiratory: Clear to auscultation bilaterally, respiratory effort normal on RA  Cardiovascular: RRR, no murmurs, cap refill brisk   Gastrointestinal: Positive bowel sounds, soft, nontender, nondistended  Musculoskeletal: No BLE edema   Psychiatric: flat affect, cooperative  Neurologic: alert, moves all extremities, speech clear  Skin: warm, dry, no visible rash     Pertinent  and/or Most Recent Results     LAB RESULTS:      Lab 09/18/22  0645 09/17/22  0522 09/16/22  1908   WBC 5.93 6.02 8.18   HEMOGLOBIN 10.4* 10.4* 11.1*   HEMATOCRIT 32.8* 33.1* 34.4*    PLATELETS 105* 120* 141   NEUTROS ABS 3.82  --  5.29   IMMATURE GRANS (ABS) 0.04  --  0.06*   LYMPHS ABS 0.82  --  1.31   MONOS ABS 1.01*  --  1.30*   EOS ABS 0.21  --  0.18   MCV 85.9 86.4 85.8         Lab 09/19/22  0340 09/18/22  0645 09/17/22  0522 09/16/22 1908   SODIUM  --  136 135* 136   POTASSIUM  --  4.5 4.2 4.0   CHLORIDE  --  101 100 99   CO2  --  22.0 22.0 23.0   ANION GAP  --  13.0 13.0 14.0   BUN  --  23 25* 25*   CREATININE  --  1.29* 1.42* 1.67*   EGFR  --  58.9* 52.5* 43.2*   GLUCOSE  --  310* 184* 85   CALCIUM  --  8.4* 8.5* 9.0   MAGNESIUM 2.3  --  1.9 1.9         Lab 09/16/22 1908   TOTAL PROTEIN 7.8   ALBUMIN 3.20*   GLOBULIN 4.6   ALT (SGPT) 61*   AST (SGOT) 70*   BILIRUBIN 0.2   ALK PHOS 158*         Lab 09/16/22 1908   PROBNP 109.7   TROPONIN T <0.010                 Brief Urine Lab Results     None        Microbiology Results (last 10 days)     ** No results found for the last 240 hours. **          Adult Transesophageal Echo (DALE) W/ Cont if Necessary Per Protocol    Result Date: 9/15/2022  · The right ventricular cavity is small in size. · Global hyokinesis with EF 35% · Moderate MR · Mild AI      XR Chest 1 View    Result Date: 9/16/2022  DATE OF EXAM: 9/16/2022 7:58 PM  PROCEDURE: XR CHEST 1 VW-  INDICATIONS: Syncope.  COMPARISON: Chest x-ray 9/12/2022  TECHNIQUE: Single frontal view of the chest.  FINDINGS: Mild increased conspicuity of small right pleural effusion with associated right lung base opacities, likely associated atelectasis. Stable cardiomediastinal silhouette within normal limits. The left lung is clear. There is no pneumothorax. No acute osseous findings.      Mild increased conspicuity of small right pleural effusion and associated right base opacities, likely associated atelectasis.  This report was finalized on 9/16/2022 8:41 PM by Lei Lizarraga MD.      XR Chest 1 View    Result Date: 9/12/2022  Examination: XR CHEST 1 VW-  Date of Exam: 9/12/2022 7:58 AM   Indication: eval effusion; L63-Ncdlkxa effusion, not elsewhere classified; R06.02-Shortness of breath.  Comparison: Radiograph 09/09/2022, 09/06/2022  Technique: 1 view of the chest  Findings: Mild patchy airspace changes present within the right lung base. Small right-sided pleural effusion present. No pneumothorax. The heart size is normal. The pulmonary vasculature appears within normal limits. No acute osseous abnormality identified.      Mild patchy airspace changes present within the right lung base with small right-sided pleural effusion, improved as compared to the previous study.  This report was finalized on 9/12/2022 8:16 AM by Jennifer Robert MD.                Results for orders placed during the hospital encounter of 09/05/22    Adult Transesophageal Echo (DALE) W/ Cont if Necessary Per Protocol    Interpretation Summary  · The right ventricular cavity is small in size.  · Global hyokinesis with EF 35%  · Moderate MR  · Mild AI      Plan for Follow-up of Pending Labs/Results: Follow up as directed.     Discharge Details        Discharge Medications      Continue These Medications      Instructions Start Date   empagliflozin 10 MG tablet tablet  Commonly known as: JARDIANCE   10 mg, Oral, Daily      insulin lispro protamine-insulin lispro (75-25) 100 UNIT/ML suspension injection  Commonly known as: humaLOG 75-25   30 Units, Subcutaneous, Every Morning      insulin lispro protamine-insulin lispro (75-25) 100 UNIT/ML suspension injection  Commonly known as: humaLOG 75-25   25 Units, Subcutaneous, Nightly      lactulose 10 GM/15ML solution  Commonly known as: CHRONULAC   20 g, Oral, 3 Times Daily      levothyroxine 75 MCG tablet  Commonly known as: SYNTHROID, LEVOTHROID   75 mcg, Oral, Daily      metoprolol succinate  MG 24 hr tablet  Commonly known as: TOPROL-XL   100 mg, Oral, Every 24 Hours Scheduled      pantoprazole 40 MG EC tablet  Commonly known as: PROTONIX   40 mg, Oral, Daily at 1100       rifAXIMin 200 MG tablet  Commonly known as: XIFAXAN   200 mg, Oral, 2 Times Daily      simvastatin 40 MG tablet  Commonly known as: ZOCOR   40 mg, Oral, Nightly      venlafaxine  MG 24 hr capsule  Commonly known as: EFFEXOR-XR   150 mg, Oral, Daily With Breakfast         Stop These Medications    furosemide 40 MG tablet  Commonly known as: LASIX     sacubitril-valsartan 49-51 MG tablet  Commonly known as: ENTRESTO     spironolactone 100 MG tablet  Commonly known as: ALDACTONE            Allergies   Allergen Reactions   • Penicillins          Discharge Disposition: [place]      Diet:  Hospital:  Diet Order   Procedures   • Diet Regular; Cardiac, Consistent Carbohydrate       Activity:  Activity Instructions     Activity as Tolerated     Additional Activity Instructions:    As tolerated              CODE STATUS:    Code Status and Medical Interventions:   Ordered at: 09/17/22 0117     Level Of Support Discussed With:    Patient     Code Status (Patient has no pulse and is not breathing):    CPR (Attempt to Resuscitate)     Medical Interventions (Patient has pulse or is breathing):    Full Support       Future Appointments   Date Time Provider Department Center   10/12/2022 11:30 AM Jacklyn Cummins APRN [facility]       Additional Instructions for the Follow-ups that You Need to Schedule     Discharge Follow-up with PCP   As directed       Currently Documented PCP:    Riley Mckoy DO    PCP Phone Number:    [phone]     Follow Up Details: 1 week         Discharge Follow-up with Specified Provider: Follow up with Dr. Valenzuela in 4 weeks.   As directed      To: Follow up with Dr. Valenzuela in 4 weeks.         Discharge Follow-up with Specified Provider: Follow up with GI as scheduled on 10/12/2022   As directed      To: Follow up with GI as scheduled on 10/12/2022                     STEPHANIE Diaz  09/22/22      Time Spent on Discharge:  I spent  42  minutes on this discharge activity which  included: face-to-face encounter with the patient, reviewing the data in the system, coordination of the care with the nursing staff as well as consultants, documentation, and entering orders.

## 2022-09-24 LAB
QT INTERVAL: 426 MS
QTC INTERVAL: 488 MS

## 2022-10-18 LAB
FUNGUS WND CULT: NORMAL
MYCOBACTERIUM SPEC CULT: NORMAL
NIGHT BLUE STAIN TISS: NORMAL